# Patient Record
Sex: FEMALE | Race: WHITE | NOT HISPANIC OR LATINO | Employment: FULL TIME | URBAN - METROPOLITAN AREA
[De-identification: names, ages, dates, MRNs, and addresses within clinical notes are randomized per-mention and may not be internally consistent; named-entity substitution may affect disease eponyms.]

---
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
## 2018-09-25 ENCOUNTER — OFFICE VISIT - HEALTHEAST (OUTPATIENT)
Dept: PEDIATRICS | Facility: CLINIC | Age: 18
End: 2018-09-25

## 2018-09-25 DIAGNOSIS — I35.0 AORTIC STENOSIS: ICD-10-CM

## 2018-09-25 DIAGNOSIS — F32.5 MAJOR DEPRESSION IN COMPLETE REMISSION (H): ICD-10-CM

## 2018-09-25 DIAGNOSIS — N94.6 DYSMENORRHEA: ICD-10-CM

## 2018-09-25 DIAGNOSIS — Q96.9 TURNER'S SYNDROME: ICD-10-CM

## 2018-09-25 ASSESSMENT — MIFFLIN-ST. JEOR: SCORE: 1462.59

## 2018-10-05 ENCOUNTER — COMMUNICATION - HEALTHEAST (OUTPATIENT)
Dept: INTERNAL MEDICINE | Facility: CLINIC | Age: 18
End: 2018-10-05

## 2018-11-13 ENCOUNTER — OFFICE VISIT - HEALTHEAST (OUTPATIENT)
Dept: CARDIOLOGY | Facility: CLINIC | Age: 18
End: 2018-11-13

## 2018-11-13 DIAGNOSIS — I34.1 MITRAL REGURGITATION DUE TO CUSP PROLAPSE: ICD-10-CM

## 2018-11-13 DIAGNOSIS — Q96.9 GONADAL DYSGENESIS: ICD-10-CM

## 2018-11-13 DIAGNOSIS — I34.0 MITRAL REGURGITATION DUE TO CUSP PROLAPSE: ICD-10-CM

## 2018-11-13 ASSESSMENT — MIFFLIN-ST. JEOR: SCORE: 1458.96

## 2018-11-14 ENCOUNTER — OFFICE VISIT - HEALTHEAST (OUTPATIENT)
Dept: MIDWIFE SERVICES | Facility: CLINIC | Age: 18
End: 2018-11-14

## 2018-11-14 DIAGNOSIS — F41.9 ANXIETY: ICD-10-CM

## 2018-11-14 DIAGNOSIS — Q96.3 TURNER SYNDROME WITH MOSAICISM: ICD-10-CM

## 2018-11-14 DIAGNOSIS — Z00.00 ENCOUNTER FOR MEDICAL EXAMINATION TO ESTABLISH CARE: ICD-10-CM

## 2018-11-14 DIAGNOSIS — E03.9 ACQUIRED HYPOTHYROIDISM: ICD-10-CM

## 2018-11-14 DIAGNOSIS — I08.0 MITRAL REGURGITATION AND AORTIC STENOSIS: ICD-10-CM

## 2018-11-14 DIAGNOSIS — L98.9 SKIN LESION OF RIGHT ARM: ICD-10-CM

## 2018-11-14 DIAGNOSIS — E66.811 CLASS 1 OBESITY WITH SERIOUS COMORBIDITY AND BODY MASS INDEX (BMI) OF 31.0 TO 31.9 IN ADULT, UNSPECIFIED OBESITY TYPE: ICD-10-CM

## 2018-11-14 LAB
ALT SERPL W P-5'-P-CCNC: 21 U/L (ref 0–45)
AST SERPL W P-5'-P-CCNC: 18 U/L (ref 0–40)
CHOLEST SERPL-MCNC: 188 MG/DL
FASTING STATUS PATIENT QL REPORTED: YES
HDLC SERPL-MCNC: 62 MG/DL
IGA SERPL-MCNC: 116 MG/DL (ref 65–400)
LDLC SERPL CALC-MCNC: 111 MG/DL
T4 FREE SERPL-MCNC: 1 NG/DL (ref 0.7–1.8)
TRIGL SERPL-MCNC: 76 MG/DL
TSH SERPL DL<=0.005 MIU/L-ACNC: 4.51 UIU/ML (ref 0.3–5)

## 2018-11-14 ASSESSMENT — MIFFLIN-ST. JEOR: SCORE: 1469.27

## 2018-11-15 LAB
25(OH)D3 SERPL-MCNC: 24 NG/ML (ref 30–80)
HBA1C MFR BLD: 5.1 % (ref 4.2–6.1)
TTG IGA SER-ACNC: 0.2 U/ML
TTG IGG SER-ACNC: <0.6 U/ML

## 2018-11-17 ENCOUNTER — COMMUNICATION - HEALTHEAST (OUTPATIENT)
Dept: OBGYN | Facility: CLINIC | Age: 18
End: 2018-11-17

## 2020-01-13 ENCOUNTER — COMMUNICATION - HEALTHEAST (OUTPATIENT)
Dept: PEDIATRICS | Facility: CLINIC | Age: 20
End: 2020-01-13

## 2020-02-04 ENCOUNTER — COMMUNICATION - HEALTHEAST (OUTPATIENT)
Dept: ADMINISTRATIVE | Facility: CLINIC | Age: 20
End: 2020-02-04

## 2020-02-17 ENCOUNTER — OFFICE VISIT - HEALTHEAST (OUTPATIENT)
Dept: FAMILY MEDICINE | Facility: CLINIC | Age: 20
End: 2020-02-17

## 2020-02-17 DIAGNOSIS — Q96.9 GONADAL DYSGENESIS: ICD-10-CM

## 2020-02-17 DIAGNOSIS — N91.3 PRIMARY OLIGOMENORRHEA: ICD-10-CM

## 2020-02-17 DIAGNOSIS — I08.0 MITRAL REGURGITATION AND AORTIC STENOSIS: ICD-10-CM

## 2020-02-17 DIAGNOSIS — F32.A MILD DEPRESSION: ICD-10-CM

## 2020-02-17 DIAGNOSIS — Z12.4 SCREENING FOR CERVICAL CANCER: ICD-10-CM

## 2020-02-17 DIAGNOSIS — F41.1 GAD (GENERALIZED ANXIETY DISORDER): ICD-10-CM

## 2020-02-17 DIAGNOSIS — E55.9 VITAMIN D DEFICIENCY: ICD-10-CM

## 2020-02-17 DIAGNOSIS — Z13.228 SCREENING FOR METABOLIC DISORDER: ICD-10-CM

## 2020-02-17 DIAGNOSIS — Z11.3 SCREEN FOR STD (SEXUALLY TRANSMITTED DISEASE): ICD-10-CM

## 2020-02-17 DIAGNOSIS — I35.0 NONRHEUMATIC AORTIC VALVE STENOSIS: ICD-10-CM

## 2020-02-17 DIAGNOSIS — E03.9 ACQUIRED HYPOTHYROIDISM: ICD-10-CM

## 2020-02-17 DIAGNOSIS — Z00.00 ROUTINE GENERAL MEDICAL EXAMINATION AT A HEALTH CARE FACILITY: ICD-10-CM

## 2020-02-17 LAB
ALBUMIN SERPL-MCNC: 4.5 G/DL (ref 3.5–5)
ALP SERPL-CCNC: 63 U/L (ref 45–120)
ALT SERPL W P-5'-P-CCNC: 31 U/L (ref 0–45)
AST SERPL W P-5'-P-CCNC: 24 U/L (ref 0–40)
BILIRUB DIRECT SERPL-MCNC: 0.2 MG/DL
BILIRUB SERPL-MCNC: 0.7 MG/DL (ref 0–1)
CHOLEST SERPL-MCNC: 208 MG/DL
FASTING STATUS PATIENT QL REPORTED: YES
HBA1C MFR BLD: 5 % (ref 3.5–6)
HDLC SERPL-MCNC: 70 MG/DL
LDLC SERPL CALC-MCNC: 124 MG/DL
PROT SERPL-MCNC: 7.2 G/DL (ref 6–8)
TRIGL SERPL-MCNC: 71 MG/DL
TSH SERPL DL<=0.005 MIU/L-ACNC: 3.4 UIU/ML (ref 0.3–5)

## 2020-02-17 RX ORDER — NORGESTIMATE AND ETHINYL ESTRADIOL 7DAYSX3 28
1 KIT ORAL DAILY
Qty: 84 TABLET | Refills: 3 | Status: SHIPPED | OUTPATIENT
Start: 2020-02-17 | End: 2021-07-29

## 2020-02-17 ASSESSMENT — MIFFLIN-ST. JEOR: SCORE: 1494.54

## 2020-02-17 ASSESSMENT — ANXIETY QUESTIONNAIRES
5. BEING SO RESTLESS THAT IT IS HARD TO SIT STILL: NEARLY EVERY DAY
3. WORRYING TOO MUCH ABOUT DIFFERENT THINGS: MORE THAN HALF THE DAYS
6. BECOMING EASILY ANNOYED OR IRRITABLE: MORE THAN HALF THE DAYS
2. NOT BEING ABLE TO STOP OR CONTROL WORRYING: NEARLY EVERY DAY
IF YOU CHECKED OFF ANY PROBLEMS ON THIS QUESTIONNAIRE, HOW DIFFICULT HAVE THESE PROBLEMS MADE IT FOR YOU TO DO YOUR WORK, TAKE CARE OF THINGS AT HOME, OR GET ALONG WITH OTHER PEOPLE: VERY DIFFICULT
7. FEELING AFRAID AS IF SOMETHING AWFUL MIGHT HAPPEN: MORE THAN HALF THE DAYS
4. TROUBLE RELAXING: SEVERAL DAYS
GAD7 TOTAL SCORE: 16
1. FEELING NERVOUS, ANXIOUS, OR ON EDGE: NEARLY EVERY DAY

## 2020-02-17 ASSESSMENT — PATIENT HEALTH QUESTIONNAIRE - PHQ9: SUM OF ALL RESPONSES TO PHQ QUESTIONS 1-9: 11

## 2020-02-18 LAB
25(OH)D3 SERPL-MCNC: 15.6 NG/ML (ref 30–80)
C TRACH DNA SPEC QL PROBE+SIG AMP: NEGATIVE
N GONORRHOEA DNA SPEC QL NAA+PROBE: NEGATIVE

## 2020-02-19 ENCOUNTER — COMMUNICATION - HEALTHEAST (OUTPATIENT)
Dept: FAMILY MEDICINE | Facility: CLINIC | Age: 20
End: 2020-02-19

## 2020-02-19 LAB
BKR LAB AP ABNORMAL BLEEDING: YES
BKR LAB AP BIRTH CONTROL/HORMONES: NORMAL
BKR LAB AP CERVICAL APPEARANCE: NORMAL
BKR LAB AP GYN ADEQUACY: NORMAL
BKR LAB AP GYN INTERPRETATION: NORMAL
BKR LAB AP HPV REFLEX: NORMAL
BKR LAB AP LMP: NORMAL
BKR LAB AP PATIENT STATUS: NO
BKR LAB AP PREVIOUS ABNORMAL: NO
BKR LAB AP PREVIOUS NORMAL: NO
HIGH RISK?: NO
PATH REPORT.COMMENTS IMP SPEC: NORMAL
RESULT FLAG (HE HISTORICAL CONVERSION): NORMAL

## 2020-02-19 RX ORDER — ERGOCALCIFEROL 1.25 MG/1
CAPSULE ORAL
Qty: 24 CAPSULE | Refills: 0 | Status: SHIPPED | OUTPATIENT
Start: 2020-02-19 | End: 2021-08-20

## 2020-02-20 ENCOUNTER — COMMUNICATION - HEALTHEAST (OUTPATIENT)
Dept: FAMILY MEDICINE | Facility: CLINIC | Age: 20
End: 2020-02-20

## 2021-04-13 ENCOUNTER — COMMUNICATION - HEALTHEAST (OUTPATIENT)
Dept: TELEHEALTH | Facility: CLINIC | Age: 21
End: 2021-04-13

## 2021-04-13 ENCOUNTER — OFFICE VISIT - HEALTHEAST (OUTPATIENT)
Dept: FAMILY MEDICINE | Facility: CLINIC | Age: 21
End: 2021-04-13

## 2021-04-13 DIAGNOSIS — E03.9 ACQUIRED HYPOTHYROIDISM: ICD-10-CM

## 2021-04-13 DIAGNOSIS — E55.9 VITAMIN D DEFICIENCY: ICD-10-CM

## 2021-04-13 DIAGNOSIS — Q96.9 GONADAL DYSGENESIS: ICD-10-CM

## 2021-04-13 DIAGNOSIS — I35.0 NONRHEUMATIC AORTIC VALVE STENOSIS: ICD-10-CM

## 2021-04-13 DIAGNOSIS — Z13.220 LIPID SCREENING: ICD-10-CM

## 2021-04-13 DIAGNOSIS — Z13.1 SCREENING FOR DIABETES MELLITUS: ICD-10-CM

## 2021-04-13 DIAGNOSIS — E66.09 CLASS 1 OBESITY DUE TO EXCESS CALORIES WITH SERIOUS COMORBIDITY AND BODY MASS INDEX (BMI) OF 31.0 TO 31.9 IN ADULT: ICD-10-CM

## 2021-04-13 DIAGNOSIS — F32.5 MAJOR DEPRESSION IN COMPLETE REMISSION (H): ICD-10-CM

## 2021-04-13 DIAGNOSIS — Z00.00 ROUTINE GENERAL MEDICAL EXAMINATION AT A HEALTH CARE FACILITY: ICD-10-CM

## 2021-04-13 DIAGNOSIS — Z30.09 COUNSELING FOR BIRTH CONTROL, ORAL CONTRACEPTIVES: ICD-10-CM

## 2021-04-13 DIAGNOSIS — F41.9 ANXIETY: ICD-10-CM

## 2021-04-13 DIAGNOSIS — Z11.3 SCREEN FOR STD (SEXUALLY TRANSMITTED DISEASE): ICD-10-CM

## 2021-04-13 DIAGNOSIS — I08.0 MITRAL REGURGITATION AND AORTIC STENOSIS: ICD-10-CM

## 2021-04-13 DIAGNOSIS — E66.811 CLASS 1 OBESITY DUE TO EXCESS CALORIES WITH SERIOUS COMORBIDITY AND BODY MASS INDEX (BMI) OF 31.0 TO 31.9 IN ADULT: ICD-10-CM

## 2021-04-13 LAB
CHOLEST SERPL-MCNC: 185 MG/DL
FASTING STATUS PATIENT QL REPORTED: YES
FASTING STATUS PATIENT QL REPORTED: YES
GLUCOSE BLD-MCNC: 98 MG/DL (ref 70–125)
HDLC SERPL-MCNC: 59 MG/DL
LDLC SERPL CALC-MCNC: 115 MG/DL
TRIGL SERPL-MCNC: 57 MG/DL
TSH SERPL DL<=0.005 MIU/L-ACNC: 5.85 UIU/ML (ref 0.3–5)

## 2021-04-13 RX ORDER — BUPROPION HYDROCHLORIDE 150 MG/1
150 TABLET ORAL EVERY MORNING
Qty: 30 TABLET | Refills: 2 | Status: SHIPPED | OUTPATIENT
Start: 2021-04-13 | End: 2021-08-20

## 2021-04-13 ASSESSMENT — MIFFLIN-ST. JEOR: SCORE: 1461.9

## 2021-04-14 LAB — 25(OH)D3 SERPL-MCNC: 18.7 NG/ML (ref 30–80)

## 2021-04-15 LAB
C TRACH DNA SPEC QL PROBE+SIG AMP: NEGATIVE
N GONORRHOEA DNA SPEC QL NAA+PROBE: NEGATIVE

## 2021-04-15 RX ORDER — LEVOTHYROXINE SODIUM 25 UG/1
25 TABLET ORAL DAILY
Qty: 90 TABLET | Refills: 0 | Status: SHIPPED | OUTPATIENT
Start: 2021-04-15 | End: 2021-08-20

## 2021-04-27 ENCOUNTER — TELEPHONE (OUTPATIENT)
Dept: ALLERGY | Facility: CLINIC | Age: 21
End: 2021-04-27

## 2021-04-27 NOTE — TELEPHONE ENCOUNTER
This patient has not been seen by provider.  Left secure voicemail for pharmacy to send request to pt's PCP.    Rosana Al RN

## 2021-04-27 NOTE — TELEPHONE ENCOUNTER
Prescription refill request.    Drug: Tri-Sprintec tablets 28's    Qty: 84    Last fill: 12/10/2020

## 2021-05-27 ASSESSMENT — PATIENT HEALTH QUESTIONNAIRE - PHQ9: SUM OF ALL RESPONSES TO PHQ QUESTIONS 1-9: 11

## 2021-05-28 ASSESSMENT — ANXIETY QUESTIONNAIRES: GAD7 TOTAL SCORE: 16

## 2021-05-31 VITALS — HEIGHT: 61 IN | WEIGHT: 179.6 LBS | BODY MASS INDEX: 33.66 KG/M2 | BODY MASS INDEX: 33.56 KG/M2

## 2021-05-31 VITALS — BODY MASS INDEX: 34.15 KG/M2 | HEIGHT: 61 IN | WEIGHT: 180.9 LBS

## 2021-05-31 VITALS — HEIGHT: 61 IN | WEIGHT: 179.1 LBS | BODY MASS INDEX: 33.81 KG/M2

## 2021-06-01 ENCOUNTER — RECORDS - HEALTHEAST (OUTPATIENT)
Dept: ADMINISTRATIVE | Facility: CLINIC | Age: 21
End: 2021-06-01

## 2021-06-01 VITALS — BODY MASS INDEX: 32.06 KG/M2 | HEIGHT: 61 IN | WEIGHT: 169.8 LBS

## 2021-06-02 ENCOUNTER — RECORDS - HEALTHEAST (OUTPATIENT)
Dept: ADMINISTRATIVE | Facility: CLINIC | Age: 21
End: 2021-06-02

## 2021-06-02 VITALS — BODY MASS INDEX: 31.91 KG/M2 | WEIGHT: 169 LBS | HEIGHT: 61 IN

## 2021-06-02 VITALS — WEIGHT: 170.4 LBS | BODY MASS INDEX: 32.17 KG/M2 | HEIGHT: 61 IN

## 2021-06-03 ENCOUNTER — RECORDS - HEALTHEAST (OUTPATIENT)
Dept: ADMINISTRATIVE | Facility: CLINIC | Age: 21
End: 2021-06-03

## 2021-06-04 VITALS
DIASTOLIC BLOOD PRESSURE: 60 MMHG | WEIGHT: 175.8 LBS | SYSTOLIC BLOOD PRESSURE: 134 MMHG | BODY MASS INDEX: 33.19 KG/M2 | HEART RATE: 92 BPM | HEIGHT: 61 IN

## 2021-06-05 VITALS
SYSTOLIC BLOOD PRESSURE: 126 MMHG | WEIGHT: 167.5 LBS | HEIGHT: 62 IN | DIASTOLIC BLOOD PRESSURE: 64 MMHG | HEART RATE: 96 BPM | BODY MASS INDEX: 30.82 KG/M2

## 2021-06-05 NOTE — TELEPHONE ENCOUNTER
Who is calling:  Patient   Reason for Call:  Patient called questioning why this medication was denied. Patient was informed of the message below. Patient stated it's only a birth control medication why is it so difficult to get it filled?! Patient was upset to hear the message below and declined to schedule appointment. Patient ended the call.  Date of last appointment with primary care: 11/14/2018  Okay to leave a detailed message: Patient ended the call before writer was able to ask for a call back number.

## 2021-06-05 NOTE — TELEPHONE ENCOUNTER
RN cannot approve Refill Request    RN can NOT refill this medication Protocol failed and NO refill given.    Kiersten Estrella, [facility] Triage/Med Refill 1/15/2020    Requested Prescriptions   Pending Prescriptions Disp Refills     TRI-SPRINTEC, 28, 0.18/0.215/0.25 mg-35 mcg (28) tablet [Pharmacy Med Name: TRI-SPRINTEC TABLETS 28] 84 tablet 3     Sig: TAKE 1 TABLET BY MOUTH DAILY       Oral Contraceptives Protocol Failed - 1/13/2020  5:48 PM        Failed - Visit with PCP or prescribing provider visit in last 12 months      Last office visit with prescriber/PCP: 9/25/2018 Alayna Montelongo MD OR same dept: Visit date not found OR same specialty: 9/25/2018 Alayna Montelongo MD  Last physical: 11/3/2017 Last MTM visit: Visit date not found   Next visit within 3 mo: Visit date not found  Next physical within 3 mo: Visit date not found  Prescriber OR PCP: Alayna Montelongo MD  Last diagnosis associated with med order: There are no diagnoses linked to this encounter.  If protocol passes may refill for 12 months if within 3 months of last provider visit (or a total of 15 months).

## 2021-06-05 NOTE — TELEPHONE ENCOUNTER
Only phone number listed is not in service and has not been seen since 11/2018 Pharmacy notified will need visit before refills.

## 2021-06-06 NOTE — PROGRESS NOTES
Dear Charlotte,    Your recent Pap smear result came back within normal limits i we will plan to repeat the pap smear in next 3 yr  Please feel free to call if you have any concerns or questions..    Sherron Lutz MD 2/19/2020 4:44 PM

## 2021-06-06 NOTE — TELEPHONE ENCOUNTER
Reached out to patient and relayed the below message. No additional questions at this time. Allison Castañeda        Notes recorded by Sherron Lutz MD on 2/19/2020 at 1:24 PM CST  Dear Charlotte,    It was a pleasure to see you in the office the other day.    I reviewed Your  recent  lab results All  Of them  back with in normal limit no concern for diabetes or thyroid hormone levels cholesterol level is slightly elevated which can easily be improved by diet and exercise,    Your vitamin D level came back quite low , we recommend treatment with prescription strength supplement. I will send prescription for 50,000iu Vitamin D to be taken ONLY  for next 3 month to the pharmacy, and regular strength prescription for Vit D [id number] can be picked Over the counter. Will recheck the level in next -1yr.      Feel free to call for any questions or concern.       level please feel free to call back for any concerns or questions.    Thank you again for allowing me to be a part of your care!

## 2021-06-06 NOTE — TELEPHONE ENCOUNTER
Reached out to patient and relayed the below message. No additional questions at this time. Allison Castañeda      Notes recorded by Sherron Lutz MD on 2/19/2020 at 4:44 PM CST  Dear Charlotte,    Your recent Pap smear result came back within normal limits i we will plan to repeat the pap smear in next 3 yr  Please feel free to call if you have any concerns or questions..    Sherron Lutz MD 2/19/2020 4:44 PM

## 2021-06-06 NOTE — TELEPHONE ENCOUNTER
Dear Charlotte,    It was a pleasure to see you in the office the other day.    I reviewed Your  recent  lab results All  Of them  back with in normal limit no concern for diabetes or thyroid hormone levels cholesterol level is slightly elevated which can easily be improved by diet and exercise,    Your vitamin D level came back quite low , we recommend treatment with prescription strength supplement. I will send prescription for 50,000iu Vitamin D to be taken ONLY  for next 3 month to the pharmacy, and regular strength prescription for Vit D [id number] can be picked Over the counter. Will recheck the level in next -1yr.

## 2021-06-13 NOTE — PROGRESS NOTES
[facility] [place] Child Check    ASSESSMENT & PLAN  Charlotte Blair is a 17 y.o. who has normal growth and normal development.    Diagnoses and all orders for this visit:    Encounter for annual physical exam  -     Lipid Profile  -     Chlamydia trachomatis & Neisseria gonorrhoeae, Amplified Detection    Horn Syndrome  -     Thyroid Stimulating Hormone (TSH)  -     T4, Free  -     Vitamin D, Total (25-Hydroxy)  -     Ambulatory referral to Endocrinology  -     Ambulatory referral to Cardiology    Mitral regurgitation and aortic stenosis  -     Ambulatory referral to Cardiology    Anxiety    Other orders  -     norgestimate-ethinyl estradiol (ORTHO TRI-CYCLEN, 28,) 0.18/0.215/0.25 mg-35 mcg (28) Tab tablet; Take 1 tablet by mouth daily.  Dispense: 84 tablet; Refill: 3  -     sertraline (ZOLOFT) 50 MG tablet; TAke 1/2 tablet once daily for 4 days, then increase to 1 tablet daily  Dispense: 30 tablet; Refill: 0  -     Influenza, Seasonal,Quad Inj, 36+ MOS    Discussed need for persistent follow up with specialty services- she is in need of follow up at Endocrine and Cardiology.  Charlotte would like to follow up at [facility] as that has been where her previous specialty providers have been at- at age 17 with her diagnoses I believe that they will continue to see her through late teens/ early 20s.  Referral written/ phone numbers given to Charlotte to make her appt.  Continue with OCP- she does not always take her pills and has difficulty with mid cycle spotting due to that.  Suggestion to put it next to tooth brush or set reminder on phone daily to take medications  She needs follow up with endocrine.  Per previous notes- she has longstanding history of suboptimal compliance with her thyroid medication as well  I have recommended becoming a part of our healthcare home.  As a young adult with multiple medical conditions, extra support to take control of medical appts/ follow ups is important for her overall health and  wellness into the years ahead.    Lastly, we discussed mental health concerns of anxiety.  She was previously on escitalopram for 1-2 months a year ago, but never returned for follow up appt.  Her GREGG 7 and PHQ9 are elevated.  Intiation of sertraline 25 mg and then increase to 50 mg reviewed.  Follow up in clinic in 4 weeks for refill and medication management.      Return to clinic in 1 year for a Well Child Check or sooner as needed    IMMUNIZATIONS/LABS  Immunizations were reviewed and orders were placed as appropriate.    REFERRALS  Dental:  Recommend routine dental care as appropriate., The patient has already established care with a dentist.  Other:  Referrals were made for endocrinology and cardiology.    ANTICIPATORY GUIDANCE  Social:  Employment and Extracurricular Activities  Parenting:  Support and Chores  Nutrition:  Dieting and Body Image  Play and Communication:  Hobbies  Health:  Activity (>45 min/day), Sleep and Dental Care  Safety:  Seat Belts  Sexuality:  Safe Sex and STD's    HEALTH HISTORY  Do you have any concerns that you'd like to discuss today?: Birth Control questions reviewed below.     Mental Health: She took 10 mg Lexapro daily for one month in December 2016 and stopped because she did not have a refill and she did not feel that it was helping. She describes fixating on things and worrying about them. She is not sure if this is symptomatic of depression or anxiety. She states that she ends up being flaky because she starts to get anxious. She has always had a problem with sleep, both with falling asleep and getting too much sleep. Of note, her dad passed away in May 2016 and she feels that she is doing well with handling this without therapy. Her GREGG-7 score is 13 and PHQ-9 score is 15 today.     No question data found.    Do you have any significant health concerns in your family history?: No  No family history on file.  Since your last visit, have there been any major changes in your  family, such as a move, job change, separation, divorce, or death in the family?: No    Home  Who lives in your home?:    Social History     Social History Narrative    Lives in apartment with roommate.      Do you have any trouble with sleep?:  No. She goes to bed between 1-3 AM and wakes up between 11 AM and noon. She works from 3-10 PM each day.     Education  What school does your child attend?:  She is taking a break but plans to go for massage therapy.   What grade is your child in?:  [employer] soon  How does the patient perform in school (grades, behavior, attention, homework?: She is currently working as a  at [employer]. She lives in an apartment with a roommate.      Eating  Does patient eat regular meals including fruits and vegetables?:  Yes  What is the patient drinking (cow's milk, water, soda, juice, sports drinks, energy drinks, etc)?: cow's milk- 1%, water and energy drinks  Does patient have concerns about body or appearance?:  No. She is living on her own and is working to cook more healthy meals. She is working to eat vegetables a couple times per week. She describes feeling a little flicker in her heart when she drinks the Energy Drinks.     Activities  Does the patient have friends?:  Yes  Does the patient get at least one hour of physical activity per day?:  Yes  Does the patient have less than 2 hours of screen time per day (aside from homework)?:  No  What does your child do for exercise?:  Go to the gym.   Does the patient have interest/participate in community activities/volunteers/school sports?: No    MENTAL HEALTH SCREENING  PHQ-2 Total Score: 2 (11/3/2017  8:00 AM)  Depression Follow-up Plan: coping support assessment (11/3/2017  8:00 AM)  PHQ-9 Total Score: 15 (11/3/2017  8:00 AM)    TB Risk Assessment:  The patient and/or parent/guardian answer positive to:  patient and/or parent/guardian answer 'no' to all screening TB questions    Dental  Is your child being seen by  "a dentist?  Yes  Flouride Varnish Application Screening  Is child seen by dentist?     Yes    Patient Active Problem List   Diagnosis     Horn Syndrome     Pain During Urination (Dysuria)     Aortic stenosis     Mitral regurgitation and aortic stenosis       Drugs  Does the patient use tobacco/alcohol/drugs?:  Denies    Safety  Does the patient have any safety concerns (peer or home)?:  no  Does the patient use safety belts, helmets and other safety equipment?:  yes    Sex  Is the patient sexually active?:  No, has been in the past. Denies SGA. Denies concern for STDs.     REVIEW OF SYSTEMS  She is taking birth control pills but is not taking these consistently, she is usually missing 1-2 pills per week. She describes her cramps as still being quite painful. Her last period was around 10/15/14507. She wears glasses.     MEASUREMENTS  Height:  5' 1.25\" (1.556 m)  Weight: 179 lb 1.6 oz (81.2 kg)  BMI: Body mass index is 33.56 kg/(m^2).  Blood Pressure: 128/68  Blood pressure percentiles are 98 % systolic and 70 % diastolic based on NHBPEP's 4th Report. Blood pressure percentile targets: 90: 120/76, 95: 124/80, 99 + 5 mmH/93.    PHYSICAL EXAM  Constitutional: She appears well-developed and well-nourished. She is awake, alert, and cooperative.  HEENT: Head: Normocephalic. Atraumatic.   Right Ear: Normal, pearly tympanic membrane; external ear and canal normal.    Left Ear: Normal, pearly tympanic membrane; external ear and canal normal.    Nose: Nose normal.    Mouth/Throat: Mucous membranes are moist. Oropharynx is clear. Tonsils +1 bilaterally. Normal dentition.   Eyes: Conjunctivae and lids are normal. PERRL, EOMI.   Neck: Supple without lymphadenopathy or tenderness. No thyromegaly or nodules.  Cardiovascular: Normal rate and regular rhythm. Femoral pulses 2+ bilaterally. S1 and S2 with 2/6 systolic ejection murmur with mid-systolic click.  Pulmonary: Clear to auscultation bilaterally. Effort and breath " sounds normal. There is normal air entry.   Chest: Normal chest wall. Breast development is normal. SMR 4.   Abdominal: Soft, nontender, nondistended. Bowel sounds are normal. No hepatosplenomegaly.  Musculoskeletal: Moving all extremities with normal range of motion. Normal strength and tone. No abnormalities. No pain in the extremities.  Spine: Spine straight without curvature noted  Genitourinary: Normal external female genitalia. SMR 4.   Neurological: She is alert and oriented x3. She has normal reflexes. Normal tone and DTRs +2 bilaterally.  Psychiatric: She has a normal mood and affect. Her speech and behavior are normal.  Skin: Clear. No rashes or lesions noted.    ADDITIONAL HISTORY SUMMARIZED (2): Reviewed Cardiology Note from 10/20/2014 regarding Horn Syndrome and mitral valve prolapse. Reviewed Endocrinology Note from 9/10/2014 regarding Horn Syndrome.  DECISION TO OBTAIN EXTRA INFORMATION (1): None.   RADIOLOGY TESTS (1): None.  LABS (1): None.  MEDICINE TESTS (1): None.  INDEPENDENT REVIEW (2 each): None.     The visit lasted a total of 34 minutes face to face with the patient. Over 50% of the time was spent counseling and educating the patient about annual wellness.    I, Mojgan Mckeon, am scribing for and in the presence of, Dr. Montelongo.    IAlayna MD, personally performed the services described in this documentation, as scribed by Mojgan Mckeon in my presence, and it is both accurate and complete.    Total Data Points: 2

## 2021-06-14 NOTE — PROGRESS NOTES
ASSESSMENT:  1. Anxiety    2. Horn Syndrome    3. Mitral regurgitation and aortic stenosis    PLAN:    Charlotte is doing well with her sertraline dose.  Her GREGG 7 is improved from 13 to 8. She has taken initiative to make Endocrinology visit and cardiology visit.  She will have follow up with cardiology in 4 months.  She is considering becoming part of health care home. I do feel her responsibility for follow up in the past 1 month has improved from years previous, even from last year.  REfills on OCP given for 1 year as well as 6 months for sertraline.  Should be seen for medication check in 6 months time.    Patient Instructions   Continue taking the sertraline daily as prescribed for the next 6 months. Follow-up in clinic in 6 months for a medication check.        No orders of the defined types were placed in this encounter.    Medications Discontinued During This Encounter   Medication Reason     sertraline (ZOLOFT) 50 MG tablet Reorder     norgestimate-ethinyl estradiol (ORTHO TRI-CYCLEN, 28,) 0.18/0.215/0.25 mg-35 mcg (28) Tab tablet Reorder       Return in about 6 months (around 6/5/2018), or for medication check, for Recheck.    CHIEF COMPLAINT:  Chief Complaint   Patient presents with     Follow-up     medication       HISTORY OF PRESENT ILLNESS:  Charlotte is a 17 y.o. female presenting to the clinic today for an anxiety follow-up.    She is currently taking sertraline 50 mg. She states it has been helping her. She feels less anxious on a consistent basis. She is still tired often but she feels more interested in daily activities. She continues to work at her job. Her sleep schedule is variable but she gets sufficient sleep. She generally sleeps from 2 a.m. To 10 a.m.  She continues to have occasional difficulty falling asleep. She denies feeling too calm or suppressed while on the sertraline. She reports being able to handle her anxiety most of the time now. She has not had any recent panic attacks. She  "would like to continue on her current regimen. She plans to enroll in post-secondary education next spring.    PHQ-9 Total Score: 9 (12/5/2017 10:00 AM)  GREGG-7 Total: 8 (12/5/2017 10:00 AM)    REVIEW OF SYSTEMS:   [facility]. She had labs drawn yesterday for thyroid hormone levels and celiac testing. Her echocardiogram was stable. She was recommended to follow-up in a year for cardiology and in 4 months for endocrinology. She takes her birth control pills as prescribed daily. She has regular monthly menstrual periods. All other systems are negative. She did not have as much break through bleeding with taking her OCP on a regular basis.    PFSH:  She met with a care guide yesterday to enroll in a program to manage her care. She has daily interaction with mother, but lives on her own in an apartment.     Past Medical History:   Diagnosis Date     Horn Syndrome     Created by Conversion      Family History   Problem Relation Age of Onset     Heart attack Father        History reviewed. No pertinent surgical history.    TOBACCO USE:  History   Smoking Status     Never Smoker   Smokeless Tobacco     Never Used     VITALS:  Vitals:    12/05/17 1016   BP: 100/60   Patient Site: Left Arm   Patient Position: Sitting   Cuff Size: Adult Large   Pulse: 88   Weight: 180 lb 14.4 oz (82.1 kg)   Height: 5' 1.25\" (1.556 m)     Wt Readings from Last 3 Encounters:   12/05/17 180 lb 14.4 oz (82.1 kg) (95 %, Z= 1.61)*   12/04/17 179 lb 9.6 oz (81.5 kg) (94 %, Z= 1.59)*   11/03/17 179 lb 1.6 oz (81.2 kg) (94 %, Z= 1.58)*     * Growth percentiles are based on CDC 2-20 Years data.     Body mass index is 33.9 kg/(m^2).    PHYSICAL EXAM:  General: Alert, no acute distress.  Ears: TMs are without erythema, pus or fluid. Position and landmarks are normal.    Nose: Clear.    Throat: Oropharynx is moist and clear, without tonsillar hypertrophy, asymmetry, exudate or " lesions.  Neck: Supple without lymphadenopathy or tenderness. No thyromegaly or nodules.  Lungs: Clear to auscultation bilaterally. No wheezes, rhonchi, or rales. No prolongation of expiratory phase. No tachypnea, retractions, or increased work of breathing.  Cardiac: Regular rate and rhythm, Grade 2/6 systolic ejection murmur with mid-systolic click audible over the left sternal border.    ADDITIONAL HISTORY SUMMARIZED (2): None.  DECISION TO OBTAIN EXTRA INFORMATION (1): None.   RADIOLOGY TESTS (1): None.  LABS (1): None.  MEDICINE TESTS (1): None.  INDEPENDENT REVIEW (2 each): None.    The visit lasted a total of 15 minutes face to face with the patient. Over 50% of the time was spent counseling and educating the patient about continued medication management of her anxiety.    IFrankie, am scribing for and in the presence of, Dr. Montelongo.    Alayna VACA MD, personally performed the services described in this documentation, as scribed by Frankie Moss in my presence, and it is both accurate and complete.    MEDICATIONS:  Current Outpatient Prescriptions   Medication Sig Dispense Refill     norgestimate-ethinyl estradiol (ORTHO TRI-CYCLEN, 28,) 0.18/0.215/0.25 mg-35 mcg (28) Tab tablet Take 1 tablet by mouth daily. 84 tablet 3     sertraline (ZOLOFT) 50 MG tablet Take 1 tablet (50 mg total) by mouth daily. TAke 1/2 tablet once daily for 4 days, then increase to 1 tablet daily 90 tablet 1     No current facility-administered medications for this visit.        Total data points: 0

## 2021-06-14 NOTE — PROGRESS NOTES
"On 11/8/17 at 9:19 AM, Dr. Montelongo sent the following message:  \"I would like Charlotte to be part of health care home.  She is with Horn syndrome and aortic stenosis, has history of not following through with follow up care with her specialists.  I saw her last week and mentioned our health care home program as support for her.  She is interested.  She had to set up her voice mail, but I told her that we would be reaching out to her.\"    Met with the patient today and discussed Clinic Care Coordination.  The patient would like to think about enrollment and will call me with a yes or no  I have provided the patient a brochure explaining Clinic Care Coordination as well as my business card  I will call the patient in 1 week if I haven't heard from her sooner    Patient was 18 min late for her appointment today  Informed the patient, per Dr. Montelongo, she can either wait (about 1 hr) or reschedule  Dr. Montelongo gave permission to use a same day/reserved appointment for this week  Patient chose to reschedule due to having other appointments to go to  Assisted in rescheduling for tomorrow, 12/5/17, at 10:00      "

## 2021-06-14 NOTE — PROGRESS NOTES
[id number]    Charlotte Lopez    YES- discussed on 11/9/17    Horn syndrome, aortic stenosis, mitral regurgitation, needs help coordinating care in adulthood    Jessica scheduled for meet and greet Decmber 4 during PCP visit    Action  Due Date   [facility] when scheduled    Action  Due Date   NA    Action  Due Date   Jessica scheduled for meet and greet Decmber 4 during PCP visit

## 2021-06-14 NOTE — PROGRESS NOTES
Discussed patient's case with Dr. Montelongo during Care Conferences today, 11/9/17  Dr. Montelongo has referred patient to Clinic Care Coordination  Patient has a pending appointment with Dr. Montelongo on 12/4/17 at 9:20  Care Guide has placed the patient on my schedule as well in order to meet with the patient and discuss enrollment with [facility]    Patient has Horn Syndrome, Aortic Stenosis, and Mitro Regurgitation  Patient needs assistance coordinating care with specialists in adulthood  Patient can continue to see Dr. Montelongo, Pediatrician, until age 19    Pending Appointments:  12/4/17 at 9:20 with Dr. Montelongo and Jessica, Clinic Care Guide  ______________  Completed Care Conference Delegations from 11/9/17:  RN Will: Complete PCAM when scheduled  WALLY Will: DAREN  Care Guide Will: Schedule the patient to see her on 12/4/17 for meet and greet  11/9/17: I have placed the patient on my schedule 12/4/17 at 9:20 when patient is coming in to see Dr. Montelongo.  I will discuss [facility] enrollment at that time.  Delegation Completed

## 2021-06-14 NOTE — PROGRESS NOTES
The Clinic Care Guide called the patient today to follow up on her decision in regard to enrolling with Clinic Care Coordination. She declined enrollment at this time and stated that she doesn't feel she needs it right now.  If in the future she decides [facility] would be beneficial to her, she will contact me.  The patient has my direct number [phone].    I have sent a copy of this note to Dr. Montelongo notifying her of the patient's decision.

## 2021-06-16 PROBLEM — E66.09 CLASS 1 OBESITY DUE TO EXCESS CALORIES WITH SERIOUS COMORBIDITY AND BODY MASS INDEX (BMI) OF 31.0 TO 31.9 IN ADULT: Status: ACTIVE | Noted: 2018-11-14

## 2021-06-16 PROBLEM — I08.0 MITRAL REGURGITATION AND AORTIC STENOSIS: Status: ACTIVE | Noted: 2017-11-03

## 2021-06-16 PROBLEM — F32.5 MAJOR DEPRESSION IN COMPLETE REMISSION (H): Status: ACTIVE | Noted: 2018-09-30

## 2021-06-16 PROBLEM — E03.9 ACQUIRED HYPOTHYROIDISM: Status: ACTIVE | Noted: 2018-11-14

## 2021-06-16 PROBLEM — F41.9 ANXIETY: Status: ACTIVE | Noted: 2017-12-05

## 2021-06-16 PROBLEM — Z30.09 COUNSELING FOR BIRTH CONTROL, ORAL CONTRACEPTIVES: Status: ACTIVE | Noted: 2021-04-13

## 2021-06-16 PROBLEM — E66.811 CLASS 1 OBESITY DUE TO EXCESS CALORIES WITH SERIOUS COMORBIDITY AND BODY MASS INDEX (BMI) OF 31.0 TO 31.9 IN ADULT: Status: ACTIVE | Noted: 2018-11-14

## 2021-06-18 NOTE — PATIENT INSTRUCTIONS - HE
Patient Instructions by Sherrno Lutz MD at 4/13/2021  9:00 AM     Author: Sherron Lutz MD Service: -- Author Type: Physician    Filed: 4/13/2021  9:04 AM Encounter Date: 4/13/2021 Status: Signed    : Sherron Lutz MD (Physician)         Patient Education     Your Bodys Response to Anxiety    Normal anxiety is part of the bodys natural defense system. It's an alert to a threat that is unknown, vague, or comes from your own internal fears. While youre in this state, your feelings can range from a vague sense of worry to physical sensations such as a pounding heartbeat. These feelings make you want to react to the threat. An anxiety response is normal in many situations. But when you have an anxiety disorder, the same response can occur at the wrong times.  Anxiety can be helpful  Normal anxiety is a signal from your brain that warns you of a threat and is a normal response to help you prevent something or decrease the bad effects of something you can't control. For example, anxiety is a normal response to situations that might damage your body, separate you from a loved one, or lose your job. The symptoms of anxiety can be physical and mental.  How does it feel?  At certain times, people with anxiety may have:    Dizziness    Muscle tension or pain    Restlessness    Sleeplessness    Trouble concentrating    Racing heartbeat    Fast breathing    Shaking or trembling    Stomachache    Diarrhea    Loss of energy    Sweating    Cold, clammy hands    Chest pain    Dry mouth  Anxiety can also be a problem  Anxiety can become a problem when it is hard to control, occurs for months, and interferes with important parts of your life. With an anxiety disorder, your body has the response described above, but in inappropriate ways. The response a person has depends on the anxiety disorder he or she has. With some disorders, the anxiety is way out of proportion to the threat that triggers it. With others, anxiety may occur  "even when there isnt a clear threat or trigger.  Who does it affect?  Some people are more prone to persistent anxiety than others. It tends to run in families, and it affects more younger people than older people, and more women than men. But no age, race, or gender is immune to anxiety problems.  Anxiety can be treated  The good news is that the anxiety thats disrupting your life can be treated. Check with your healthcare provider and rule out any physical problems that may be causing the anxiety symptoms. If an anxiety disorder is diagnosed seek mental healthcare. This is an illness and it can respond to treatment. Most types of anxiety disorders will respond to \"talk therapy\" and medicines. Working with your doctor or other healthcare provider, you can develop skills to help you cope with anxiety. You can also gain the perspective you need to overcome your fears. Note: Good sources of support or guidance can be found at your local hospital, mental health clinic, or an employee assistance program.  How to cope with anxiety  If anxiety is wearing you down, here are some things you can do to cope:    Keep in mind that you cant control everything about a situation. Change what you can and let the rest take its course.    Exercise--its a great way to relieve tension and help your body feel relaxed.    Avoid caffeine and nicotine, which can make anxiety symptoms worse.    Fight the temptation to turn to alcohol or unprescribed drugs for relief. They only make things worse in the long run.    Educate yourself about anxiety disorders. Keep track of helpful online resources and books you can use during stressful periods.    Try stress management techniques such as meditation.    Consider online or in-person support groups.   Date Last Reviewed: 1/1/2017 2000-2019 [employer]. [address] [place] [address], [place] [address]. All rights reserved. This information is not intended as a substitute for professional " medical care. Always follow your healthcare professional's instructions.           Patient Education     Treating Anxiety Disorders with Medicine  An anxiety disorder can make you feel nervous or apprehensive, even without a clear reason. In people age 65 and older, generalized anxiety disorder is one of the most commonly diagnosed anxiety disorders. Many times it occurs with depression. Certain anxiety disorders can cause intense feelings of fear or panic. You may even have physical symptoms such as a racing heartbeat, sweating, or dizziness. If you have these feelings, you dont have to suffer anymore. Treatment to help you overcome your fears will likely include therapy (also called counseling). Medicine may also be prescribed to help control your symptoms.    Medicines  Certain medicines may be prescribed to help control your symptoms. So you may feel less anxious. You may also feel able to move forward with therapy. At first, medicines and dosages may need to be adjusted to find what works best for you. Try to be patient. Tell your healthcare provider how a medicine makes you feel. This way, you can work together to find the treatment thats best for you. Keep in mind that medicines can have side effects. Talk with your provider about any side effects that are bothering you. Changing the dose or type of medicine may help. Dont stop taking medicine on your own. That can cause symptoms to come back or cause dangerous withdrawal symptoms.    Anti-anxiety medicine. This medicine eases symptoms and helps you relax. Your healthcare provider will explain when and how to use it. It may be prescribed for use before situations that make you anxious. You may also be told to take medicine on a regular schedule. Anti-anxiety medicine may make you feel a little sleepy or out of it. Dont drive a car or operate machinery while on this medicine, until you know how it affects you.  Never use alcohol or other drugs with  anti-anxiety medicines. This could result in loss of muscular control, sedation, coma, or death. Also, use only the amount of medicine prescribed for you. If you think you may have taken too much, get emergency care right away. Never share your medications with others. Store these medications in a safe place that can't be accessed by children or visitors.  Keep taking medicines as prescribed  Never change your dosage, share or use another person's medicine, or stop taking your medicines without talking to your healthcare provider first. Keep the following in mind:    Some medicines must be taken on a schedule. Make this part of your daily routine. For instance, always take your pill before brushing your teeth. A pillbox can help you remember if youve taken your medicine each day.    Medicines are often taken for 6 to 12 months. Your healthcare provider will then evaluate whether you need to stay on them. Many people who have also had therapy may no longer need medicine to manage anxiety.    You may need to stop taking medicine slowly to give your body time to adjust. When its time to stop, your healthcare provider will tell you more. Remember: Never stop taking your medicine without talking to your provider first.    If symptoms return, you may need to start taking medicines again. This isnt your fault. Its just the nature of your anxiety disorder.    Side effects. Medicines may cause side effects. Ask your healthcare provider or pharmacist what you can expect. They may have ideas for avoiding some side effects.    Sexual problems. Some antidepressants can affect your desire for sex or your ability to have an orgasm. A change in dosage or medicine often solves the problem. If you have a sexual side effect that concerns you, tell your healthcare provider.    Addiction. If youve never had a problem with drugs or alcohol, you may not have a problem with medicines used to treat anxiety disorders. But always discuss the  medicines with your healthcare provider before taking them. If you have a history of addiction, you may not be able to use certain medicines used to treat anxiety disorders.    Medicine interactions. Always check with your pharmacist before using any over-the-counter medicines (OTCs), including herbal supplements. Some OTCs may interact with your anti-anxiety medications and increase or decrease their effectiveness.    Date Last Reviewed: 5/1/2017 2000-2019 [employer]. [address] [place] [address], [place] [address]. All rights reserved. This information is not intended as a substitute for professional medical care. Always follow your healthcare professional's instructions.

## 2021-06-18 NOTE — PATIENT INSTRUCTIONS - HE
Patient Instructions by Sherron Lutz MD at 2/17/2020 11:20 AM     Author: Sherron Lutz MD Service: -- Author Type: Physician    Filed: 2/17/2020 12:46 PM Encounter Date: 2/17/2020 Status: Signed    : Sherron Lutz MD (Physician)         Patient Education     Prevention Guidelines, Women Ages 18 to 39  Screening tests and vaccines are an important part of managing your health. A screening test is done to find possible disorders or diseases in people who don't have any symptoms. The goal is to find a disease early so lifestyle changes can be made and you can be watched more closely to reduce the risk of disease, or to detect it early enough to treat it most effectively. Screening tests are not considered diagnostic, but are used to determine if more testing is needed. Health counseling is essential, too. Below are guidelines for these, for women ages 18 to 39. Talk with your healthcare provider to make sure youre up-to-date on what you need.  Screening Who needs it How often   Alcohol misuse All women in this age group At routine exams   Blood pressure All women in this age group Yearly checkup if your blood pressure is normal  Normal blood pressure is less than 120/80 mm Hg  If your blood pressure reading is higher than normal, follow the advice of your healthcare provider   Breast cancer All women in this age group should talk with their healthcare providers about the need for clinical breast exams (CBE)1 Clinical breast exam every 3 years1   Cervical cancer Women ages 21 and older Women between ages 21 and 29 should have a Pap test every 3 years; women between ages 30 and 65 are advised to have a Pap test plus an HPV test every 5 years   Chlamydia Sexually active women ages 25 and younger, and women at increased risk for infection (such as having multiple sex partners) Every year if you're at risk or have symptoms   Depression All women in this age group At routine exams   Type 2 diabetes, prediabetes All  women with no symptoms who are overweight or obese and have 1 or more other risk factors for diabetes At least every 3 years. Also, testing for diabetes during pregnancy after the 24th week.    Type 2 diabetes, prediabetes All women diagnosed with gestational diabetes Lifelong testing every 3 years   Type 2 diabetes All women with prediabetes Every year   Gonorrhea Sexually active women at increased risk for infection At routine exams   Hepatitis C Anyone at increased risk At routine exams   HIV All women should be tested at least once for HIV between the ages of 13 and 64 At routine exams. Those with risk factors for HIV should be tested at least annually.    Obesity All women in this age group At routine exams   Syphilis Women at increased risk for infection should talk with their healthcare provider At routine exams   Tuberculosis Women at increased risk for infection should talk with their healthcare provider Ask your healthcare provider   Vision All women in this age group At least 1 complete exam in your 20s, and 2 in your 30s   Vaccine2 Who needs it How often   Chickenpox (varicella) All women in this age group who have no record of this infection or vaccine 2 doses; the second dose should be given 4 to 8 weeks after the first dose   Hepatitis A Women at increased risk for infection should talk with their healthcare provider 2 doses given at least 6 months apart   Hepatitis B Women at increased risk for infection should talk with their healthcare provider 3 doses over 6 months; second dose should be given 1 month after the first dose; the third dose should be given at least 2 months after the second dose and at least 4 months after the first dose   Haemophilus influenzae Type B (HIB) Women at increased risk for infection should talk with their healthcare provider 1 to 3 doses   Human papillomavirus (HPV) All women in this age group up to age 26 3 doses; the second dose should be given 1 to 2 months after the  first dose and the third dose given 6 months after the first dose   Influenza (flu) All women in this age group Once a year   Measles, mumps, rubella (MMR) All women in this age group who have no record of these infections or vaccines 1 or 2 doses   Meningococcal Women at increased risk for infection should talk with their healthcare provider 1 or more doses   Pneumococcal conjugate vaccine (PCV13) and pneumococcal polysaccharide vaccine (PPSV23) Women at increased risk for infection should talk with their healthcare provider PCV13: 1 dose ages 19 to 65 (protects against 13 types of pneumococcal bacteria)  PPSV23: 1 to 2 doses through age 64, or 1 dose at 65 or older (protects against 23 types of pneumococcal bacteria)      Tetanus/diphtheria/pertussis (Td/Tdap) booster All women in this age group Td every 10 years, or a one-time dose of Tdap instead of a Td booster after age 18, then Td every 10 years   Counseling Who needs it How often   BRCA gene mutation testing for breast and ovarian cancer susceptibility Women with increased risk for having gene mutation When your risk is known   Breast cancer and chemoprevention Women at high risk for breast cancer When your risk is known   Diet and exercise Women who are overweight or obese When diagnosed, and then at routine exams   Domestic violence Women at the age in which they are able to have children At routine exams   Sexually transmitted infection prevention Women who are sexually active At routine exams   Skin cancer Prevention of skin cancer in fair-skinned adults At routine exams   Use of tobacco and the health effects it can cause All women in this age group Every visit   1 According to the ACS, women ages 20 to 39 years should have a clinical breast exam (CBE) as part of their routine health exam every 3 years. Breast self-exams are an option for women starting in their 20s. But the USPSTF does not recommend CBE.  Date Last Reviewed: 10/1/2017    3846-1013 [employer]. [address] [place] [address], [place] [address]. All rights reserved. This information is not intended as a substitute for professional medical care. Always follow your healthcare professional's instructions.

## 2021-06-20 NOTE — LETTER
Letter by Sherron Lutz MD at      Author: Sherron uLtz MD Service: -- Author Type: --    Filed:  Encounter Date: 2/17/2020 Status: (Other)                    My Depression Action Plan  Name: Charlotte Blair   Date of Birth 2000  Date: 2/17/2020    My Doctor: Sherron Lutz MD   My Clinic: [facility]  [address] [facility] [place] [address]  [phone]          GREEN    ZONE   Good Control    What it looks like:     Things are going generally well. You have normal ups and downs. You may even feel depressed from time to time, but bad moods usually last less than a day.   What you need to do:  1. Continue to care for yourself (see self care plan)  2. Check your depression survival kit and update it as needed  3. Follow your physicians recommendations including any medication.  4. Do not stop taking medication unless you consult with your physician first.           YELLOW         ZONE Getting Worse    What it looks like:     Depression is starting to interfere with your life.     It may be hard to get out of bed; you may be starting to isolate yourself from others.    Symptoms of depression are starting to last most all day and this has happened for several days.     You may have suicidal thoughts but they are not constant.   What you need to do:     1. Call your care team. Your response to treatment will improve if you keep your care team informed of your progress. Yellow periods are signs an adjustment may need to be made.     2. Continue your self-care.  Just get dressed and ready for the day.  Don't give yourself time to talk yourself out of it.    3. Talk to someone in your support network.    4. Open up your depression Depression Self-Care Plan / Wellness kit.           RED    ZONE Medical Alert - Get Help    What it looks like:     Depression is seriously interfering with your life.     You may experience these or other symptoms: You cant get out of bed most days, cant work  or engage in other necessary activities, you have trouble taking care of basic hygiene, or basic responsibilities, thoughts of suicide or death that will not go away, self-injurious behavior.     What you need to do:  1. Call your care team and request a same-day appointment. If they are not available (weekends or after hours) call your local crisis line, emergency room or 911.            Self-Care Plan / Wellness Kit    Self-Care for Depression  Heres the deal. Your body and mind are really not as separate as most people think.  What you do and think affects how you feel and how you feel influences what you do and think. This means if you do things that people who feel good do, it will help you feel better.  Sometimes this is all it takes.  There is also a place for medication and therapy depending on how severe your depression is, so be sure to consult with your medical provider and/ or Behavioral Health Consultant if your symptoms are worsening or not improving.     In order to better manage my stress, I will:    Exercise  Get some form of exercise, every day. This will help reduce pain and release endorphins, the feel good chemicals in your brain. This is almost as good as taking antidepressants!  This is not the same as joining a gym and then never going! (they count on that by the way?) It can be as simple as just going for a walk or doing some gardening, anything that will get you moving.      Hygiene   Maintain good hygiene (get out of bed in the morning, make your bed, brush your teeth, take a shower, and get dressed like you were going to work, even if you are unemployed).  If your clothes don't fit try to get ones that do.    Diet  Strive to eat foods that are good for me, drink plenty of water, and avoid excessive sugar, caffeine, alcohol, and other mood-altering substances.  Some foods that are helpful in depression are: complex carbohydrates, B vitamins, flaxseed, fish or fish oil, fresh fruits and  vegetables.    Psychotherapy  Agree to participate in Individual Therapy (if recommended).    Medication  If prescribed medications, I agree to take them.  Missing doses can result in serious side effects.  I understand that drinking alcohol, or other illicit drug use, may cause potential side effects.  I will not stop my medication abruptly without first discussing it with my provider.    Staying Connected With Others  Stay in touch with my friends, family members, and my primary care provider/team.    Use your imagination  Be creative.  We all have a creative side; it doesnt matter if its oil painting, sand castles, or mud pies! This will also kick up the endorphins.    Witness Beauty  (AKA stop and smell the roses) Take a look outside, even in mid-winter. Notice colors, textures. Watch the squirrels and birds.     Service to others  Be of service to others.  There is always someone else in need.  By helping others we can get out of ourselves and remember the really important things.  This also provides opportunities for practicing all the other parts of the program.    Humor  Laugh and be silly!  Adjust your TV habits for less news and crime-drama and more comedy.    Control your stress  Try breathing deep, massage therapy, biofeedback, and meditation. Find time to relax each day.     Crisis Text Line  http://www.crisistextline.org    The Crisis Text Line serves anyone, in any type of crisis, providing access to free, 24/7 support and information via the medium people already use and trust:    Here's how it works:  1.  Text 977-960 from anywhere in the [place], anytime, about any type of crisis.  2.  A live, trained Crisis Counselor receives the text and responds quickly.  3.  The volunteer Crisis Counselor will help you move from a 'hot moment to a cool moment'.  My support system    Clinic Contact:  Phone number:    Contact 1:  Phone number:    Contact 2:  Phone number:    Congregation/:  Phone  number:    Therapist:  Phone number:    [facility] crisis center:    Phone number:    Other community support:  Phone number:

## 2021-06-20 NOTE — PROGRESS NOTES
ASSESSMENT:  1. Dysmenorrhea    2. Aortic stenosis  - Ambulatory referral to Cardiology    3. Horn's syndrome  - Ambulatory referral to Cardiology    4. Major depression in complete remission (H)      PLAN:  Regarding dysmenorrhea, garret discussion is that no OCP will help with cramping severity and period regulation if not taken on a regular basis.  I do not suggest changing OCP for Charlotte, but suggested using a alarm on her cell phone daily to remind her to take the pill.  It will be the most effective way to help regulate her period and reduce dysmenorrhea.  From a pregnancy prevention standpoint, I encouraged her ot continue with condoms as a 2nd means of contraceptive, particularly given her history of irregular taking of OCP.  She does not want a more permanent birth control option at this point, and with her Horn syndrome the estrogen component of OCP is serving purpose as well.  We discussed transition of care to adult medicine.  Referral was made for [facility] and specialty scheduling will help set up appt for her.  I have suggested to her that she start to work with an internist for her ongoing primary care needs.    I have suggested that she call [facility] to start to see Isabela Gibson MD if she is taking new patients.  Also, recommend starting care with gynecologist and recommendations given.    Patient Instructions   Alleve 2 pills on first day of period/ cramping ; followed by 1 pill every 12 hours    No midol.    Do pill on regular basis.    Adefris and Toppin: Leandro Wesley         Make an appointment for them to help set intiation of care    Dr. Isabela Gibson is an internal med MD at [facility] by [facility].      Orders Placed This Encounter   Procedures     Ambulatory referral to Cardiology     Referral Priority:   Routine     Referral Type:   Consultation     Referral Reason:   Evaluation and Treatment     Requested Specialty:   Cardiology     Number of  Visits Requested:   1     Medications Discontinued During This Encounter   Medication Reason     norgestimate-ethinyl estradiol (ORTHO TRI-CYCLEN, 28,) 0.18/0.215/0.25 mg-35 mcg (28) Tab tablet Reorder     sertraline (ZOLOFT) 50 MG tablet        No Follow-up on file.    CHIEF COMPLAINT:  Chief Complaint   Patient presents with     Contraception     getting cramps with the pill       HISTORY OF PRESENT ILLNESS:  Charlotte is a 18 y.o. female presenting to the clinic today for follow up of OCP management for dysmenorrhea.  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  She has had intermittent follow up in her teenage years, and essentially reestablished care last year with primary care and her specialists.  She is due for annual cardiology follow up and endocrine follow up.  She would like to transition to adult clinics.    For her dysmenorrhea, she feels that her cramps are still awful.  She has terrible cramping and nausea.  She is wondering if there is a different OCP that would work better for her.  She is in a sexual relationship with 1 male partner, and they have been in a relationship ongoing for quite some time.  She does not want to get pregnant at this time, but states that may change over the next few years, and she wants to keep with birth control options that keep her having a period.  She admits that she does not regulalry take her pills, likely takes 3 pills a week.  Her and her partner use condoms each time they have intercourse as a back up method.   She denies pain with intercourse, post coital bleeding or abnormal vaginal discharge.        REVIEW OF SYSTEMS:    All other systems are negative.    PFSH:  Charlotte did start sertraline for anxiety and depression symptoms about 1 year ago.  She took herself off of the  "sertraline, and states that she no longer is experiencing anxiety symptoms or depressive symptoms.  She is trying to exercise more to help with mood, and feels that it has been beneficial.  Charlotte has graduated from .  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  She will also be working at the [employer] part time during her schooling.  She is very excited about this.  Her father has passed away from MI.  She lives independently out of mothers home and in apartment with roomate    Past Medical History:   Diagnosis Date     Horn Syndrome     Created by Conversion        Family History   Problem Relation Age of Onset     Heart attack Father        No past surgical history on file.    Social History     Social History     Marital status: Single     Spouse name: N/A     Number of children: N/A     Years of education: N/A     Occupational History     Not on file.     Social History Main Topics     Smoking status: Never Smoker     Smokeless tobacco: Never Used     Alcohol use Not on file     Drug use: Not on file     Sexual activity: Not on file     Other Topics Concern     Not on file     Social History Narrative    Lives in apartment with roommate.        TOBACCO USE:  History   Smoking Status     Never Smoker   Smokeless Tobacco     Never Used       VITALS:  Vitals:    09/25/18 1118   BP: 124/60   Patient Site: Left Arm   Patient Position: Sitting   Cuff Size: Adult Large   Pulse: (!) 112   Temp: 97.9  F (36.6  C)   TempSrc: Oral   Weight: 169 lb 12.8 oz (77 kg)   Height: 5' 1\" (1.549 m)     Wt Readings from Last 3 Encounters:   09/25/18 169 lb 12.8 oz (77 kg)   08/25/18 165 lb (74.8 kg)   12/05/17 180 lb 14.4 oz (82.1 kg) (95 %, Z= 1.61)*     * Growth percentiles are based on CDC 2-20 Years data.     Body mass index is 32.08 kg/(m^2).    PHYSICAL EXAM:  General: Alert, no acute distress.   Eyes: PERRL, EOMI, Conjunctivae clear.  Ears: TMs " are without erythema, pus or fluid. Position and landmarks are normal.    Nose: Clear.    Throat: Oropharynx is moist and clear, without tonsillar hypertrophy, asymmetry, exudate or lesions.  Neck: Supple without lymphadenopathy or tenderness. No thyromegaly or nodules.  Lungs: Clear to auscultation bilaterally. No wheezes, rhonchi, or rales. No prolongation of expiratory phase. No tachypnea, retractions, or increased work of breathing.  Cardiac: Regular rate and rhythm,Grade 2/6 systolic ejection murmur with mid-systolic click audible over the left sternal border  Abdomen: Soft, nontender, nondistended. Bowel sounds present. No hepatosplenomegaly or mass palpable.  Skin: Clear without rashes or lesions.    TT spent 25 min> greater than 50 % in counseling on dysmenorrhea and transition of care goals.

## 2021-06-20 NOTE — LETTER
Letter by Tori Scott MD at      Author: Tori Scott MD Service: -- Author Type: --    Filed:  Encounter Date: 2/4/2020 Status: (Other)         Charlotte Blair  [address] [facility] [address]      February 4, 2020      Dear Charlotte,    This letter is to remind you that you will be due for your follow up appointment with Dr. Tori Scott in March, 2020 . To help ensure you are in the best health possible, a regular follow-up with your cardiologist is essential.     Please call our Patient Scheduling Line at [phone] to schedule your appointment at your earliest convenience.  If you have recently scheduled an appointment, please disregard this letter.    We look forward to seeing you again. As always, we are available at the number  above for any questions or concerns you may have.      Sincerely,     The Physicians and Staff of [facility]

## 2021-06-21 NOTE — PROGRESS NOTES
Chief Complaint: Establish care visit, discuss family planning    HPI: Ysabel is here alone today.  She is coming to transition from pediatric to adult care.  This is important given her diagnosis of Horn syndrome.  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  She notes that she should be seeing an endocrinologist, however she has not followed up with that.  Originally, she was seeing an endocrinologist who did start her on estrogen around the age of 13 which induced her first period.  She does not believe that they did any testing prior to see if she had any ovarian follicle reserve to see if she might be able to bear her own children with her own eggs.  She has known since her diagnosis, that she will likely be sterile, and that Horn syndrome is a high risk for pregnancy.  Yet, she is hopeful that she would be able to carry one child and then would like to adopt one child in the future.  She does not want to achieve pregnancy within the last next couple of years, however.  She does have a partner that she has been with for the last 6 months and having intercourse with him for the last 4 months.  She is currently on a combined oral contraceptive pill and they use condoms with each active intercourse.  She is aware that some women with Horn syndrome do have the ability to produce follicles, but more often, IVF with donor eggs is necessary.  She also understands that pregnancy would be high risk, especially given her cardiac diagnoses.  We discussed that as she gets closer to wanting to achieve pregnancy, that we would have her see reproductive endocrinology to assess her ability to produce follicles, IVF options, and also referred to maternal-fetal medicine for preconception counseling and how Horn syndrome would impact her pregnancy.  She is open to this.  She is also aware that if her thyroid hormone levels are elevated, she would need to have these adjusted with an  endocrinologist.  A referral was placed for her today.    We discussed that optimally, it is best in terms of VT E prevention, that she be on estrogen with cyclic progesterone as opposed to con bind oral contraception.  This is because COCs can increase the risk of clot formation.  JR's do provide contraception, however.  She is open to and changing her regimen and has become much more responsible at taking her pill every day.  She has not had any breakthrough bleeding.  We discussed taking micronized estrogen daily and then taking micronized progesterone for the first 12 days of each month.  If she is not able to tolerate this or complete the regimen, I would recommend a lower estrogen dose combined oral contraceptive like Loestrin 1/20.  She was on a similar regimen when she was first placed on estrogen.  She notes that she had a very bad cramping and vomiting with her cycles.  We discussed that this can be common in early menarche, so may not be the same if she goes back to this regimen.    She did have a cardiology visit yesterday and an echocardiogram was ordered.  She has yet to schedule this, does plan to do this.  She has no cardiac symptoms like fainting, dizziness, heart palpitations, or chest pain.    She has questions today about painful intercourse.  Her current partner is the second gentleman that she has had sex with.  She notes that she feels pain with intercourse and does not think it is related to position.  They have tried a few positions and it is the same regardless.  She does not feel the same pain when he inserts his fingers into her vagina.  She does use lubrication along with condoms with each active intercourse.  She feels that they do enough foreplay to allow for her own formation of lubrication.  She had similar discomfort with her previous boyfriend while having intercourse.  She does note that both men had fairly large penises which could contribute.  When asked if she thought that the  pain may be psychological, she reported that it could be in the fact that she is nervous that it will cause pain so that might increase the pain.  We discussed lower natural lubrication with low levels of estrogen, and I recommended twice weekly vaginal moisturizer as well as lubrication with each intercourse act.  I also recommended that they try using a sex toy that is smaller than his penis to see if this made a difference in terms of pain to help identify the issue.  This may also help her psychologically if it is used prior to his insertion of his own penis.  We discussed the benefit of her having a more dominant position to help control the depth and the rhythm and that this may help with the discomfort as well.    She does have anxiety.  She had previously been treated with Zoloft.  She went off her Zoloft and feels like her anxiety is well controlled.  She thinks that being on cyclic hormones helps her anxiety level as well.    We discussed recommended labs and procedures including: Thyroid levels, vitamin D, liver enzyme levels, hemoglobin A1c, lipid screening, screening for celiac disease, bone scan, audiology exam, and Pap smear with breast exam at age 19.  She accepts all of these labs and procedures with exception of audiology exam.  She reports that she is familiar with her own breast tissue and we discussed the importance of continuing this practice.  She declines a breast and pelvic exam today.    Encouraged healthy diet with at least 1000 mg of calcium per day and added protein.  Also recommended 20-30 minutes of exercise most days of the week or 2-3 hours/week.    Review of Systems - History obtained from chart review and the patient  General ROS: negative  Psychological ROS:positive for - anxiety  Ophthalmic ROS: negative  ENT ROS: negative  Endocrine ROS: positive for -amenorrhea if not on hormones, denies recent breakthrough bleeding  Breast ROS: negative for breast lumps  Respiratory ROS: no  cough, shortness of breath, or wheezing  Cardiovascular ROS: no chest pain or dyspnea on exertion, has murmur  Gastrointestinal ROS: no abdominal pain, change in bowel habits, or black or bloody stools  Genito-Urinary ROS: positive for - dyspareunia  Musculoskeletal ROS: negative  Neurological ROS: negative  Dermatological ROS: positive for skin lesion changes      Patient's last menstrual period was 10/26/2018 (exact date).  OB History    Para Term  AB Living   0 0 0 0 0 0   SAB TAB Ectopic Multiple Live Births   0 0 0 0 0           Past Medical History:   Diagnosis Date     Anxiety     previous on zoloft     Disease of thyroid gland     hypothyroxine     Horn Syndrome     Created by Conversion      Urinary tract infection     non recurrent     Past Surgical History:   Procedure Laterality Date     WISDOM TOOTH EXTRACTION       Family History   Problem Relation Age of Onset     Heart attack Father      Hypertension Mother      Ovarian cysts Sister      Hyperlipidemia Brother      No Medical Problems Maternal Grandmother      Heart attack Maternal Grandfather      Parkinsonism Paternal Grandmother      Suicidality Paternal Grandfather      Social History     Socioeconomic History     Marital status: Single     Spouse name: Not on file     Number of children: Not on file     Years of education: Not on file     Highest education level: Not on file   Social Needs     Financial resource strain: Not on file     Food insecurity - worry: Not on file     Food insecurity - inability: Not on file     Transportation needs - medical: Not on file     Transportation needs - non-medical: Not on file   Occupational History     Not on file   Tobacco Use     Smoking status: Never Smoker     Smokeless tobacco: Never Used   Substance and Sexual Activity     Alcohol use: No     Frequency: Never     Drug use: No     Sexual activity: Yes     Partners: Male     Birth control/protection: OCP, Condom   Other Topics Concern      Not on file   Social History Narrative    Lives in apartment with roommate.        Current Outpatient Medications:      norgestimate-ethinyl estradiol (ORTHO TRI-CYCLEN, 28,) 0.18/0.215/0.25 mg-35 mcg (28) Tab tablet, Take 1 tablet by mouth daily., Disp: 84 tablet, Rfl: 3     estradiol (ESTRACE) 1 MG tablet, Take 1 tablet (1 mg total) by mouth daily., Disp: 84 tablet, Rfl: 3     progesterone (PROMETRIUM) 200 MG capsule, Take one capsule for the first 12 days of each monthly cycle., Disp: 36 capsule, Rfl: 3  Allergies   Allergen Reactions     Amoxicillin Hives     Penicillins Hives       Objective:  General appearance - alert, well appearing, and in no distress  Mental status - alert, oriented to person, place, and time  Eyes - pupils equal, extraocular eye movements intact  Mouth - mucous membranes moist, pharynx normal without lesions  Neck - supple, no significant adenopathy, thyroid exam: thyroid is normal in size without nodules or tenderness  Lymphatics - no palpable lymphadenopathy, no hepatosplenomegaly  Chest - clear to auscultation, no wheezes, rales or rhonchi, symmetric air entry  Heart - normal rate and regular rhythm, murmur noted  Abdomen - soft, nontender, nondistended, no masses or organomegaly  Breasts - patient declines to have breast exam  Pelvic - exam declined by the patient  Back exam - full range of motion, no tenderness, palpable spasm or pain on motion  Neurological - alert, oriented, normal speech, no focal findings or movement disorder noted  Musculoskeletal - no joint tenderness, deformity or swelling  Extremities - grossly normal  Skin - normal coloration and turgor, no rashes, 1/2cm light brown raised lesion on right upper arm    PHQ-9: 11    Assessment:  Establish Care visit  Horn Syndrome mosaicism  Anxiety  Hypothyroid  Lesion on right arm  Dyspareunia    Plan:  TSH, T4, Vit D, ALT, AST, A1C, Lipid profile, TTGA, IgA, DXA scan  Dermatology referral  Reproductive endocrine  referral    Shantell Browning CNM

## 2021-06-26 NOTE — PROGRESS NOTES
Progress Notes by Tori Scott MD at 11/13/2018 10:30 AM     Author: Tori Scott MD Service: -- Author Type: Physician    Filed: 11/13/2018 11:03 AM Encounter Date: 11/13/2018 Status: Signed    : Tori Scott MD (Physician)           Click to link to [facility] NOTE    Thank you, Dr. Rowe, for asking me to see Charlotte Blair in consultation at [facility] to evaluate mitral valve prolapse with regurgitation.      Assessment/Plan:   Charlotte is 18 years old young woman with 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 is referred to [facility] for establishing adult cardiology care.  1.  Mitral valve prolapse with mild to moderate mitral valve regurgitation: Clinically, she has no symptoms.  Echocardiogram is requested for evaluation mitral valve structure and function, aortic valve structure and function, and also heart function and structure.  Encouraged her to eat regular exercise and loss of weight.    2.  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    3.  Charlotte's TSH was high, continue to follow-up with Dr. Montelongo.    Thank you for the opportunity to be involved in the care of Charlotte Blair. If you have any questions, please feel free to contact me.  I will see the patient again in 12 months.    Much or all of the text in this note was generated through the use of Dragon Dictate voice-to-text software. Errors in spelling or words which seem out of context are unintentional.   Sound alike errors, in particular, may have escaped editing.       History of Present Illness:   It is my pleasure to see Charlotte Blair at the [facility] for evaluation of Consult. Charlotte Blair is a 18 y.o. 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    The patient is referred to cardiology clinic for establishing adult cardiology care.  Clinically, she has been doing quite well.  She has no chest pain, shortness of breath, fatigue, dizziness, palpitations, orthopnea, PND or leg edema.  The patient has her routine activities without any symptoms.  Her blood pressure, heart rate are in normal range.    Past Medical History:     Patient Active Problem List   Diagnosis   ? Horn Syndrome   ? Pain During Urination (Dysuria)   ? Aortic stenosis   ? Mitral regurgitation and aortic stenosis   ? Anxiety   ? Major depression in complete remission (H)       Past Surgical History:   History reviewed. No pertinent surgical history.    Family History:     Family History   Problem Relation Age of Onset   ? Heart attack Father        Social History:    reports that  has never smoked. she has never used smokeless tobacco.    Review of Systems:   General: WNL  Eyes: WNL  Ears/Nose/Throat: WNL  Lungs: Snoring  Heart: WNL  Stomach: WNL  Bladder: WNL  Muscle/Joints: WNL  Skin: WNL  Nervous System: WNL  Mental Health: Anxiety     Blood: WNL    Meds:     Current Outpatient Medications:   ?  norgestimate-ethinyl estradiol (ORTHO TRI-CYCLEN, 28,) 0.18/0.215/0.25 mg-35 mcg (28) Tab tablet, Take 1 tablet by mouth daily., Disp: 84 tablet, Rfl: 3     Allergies:   Amoxicillin and Penicillins    Objective:      Physical Exam  169 lb (76.7 kg)  5' 1\" (1.549 m)  Body mass index is 31.93 kg/m .  /56 (Patient Site: Right Arm, Patient Position: Sitting, Cuff Size: Adult Large)   Pulse (!) 104   Resp 16   Ht 5' 1\" (1.549 m)   Wt 169 lb (76.7 kg)   BMI 31.93 kg/m      General Appearance:   Awake, Alert, No acute distress.   HEENT:  Pupil equal, reactive to light. No scleral icterus; the mucous membranes were moist. No oral ulcers or thrush.    Neck: No cervical bruits. No JVD. No thyromegaly. No lymph node enlargement or tenderness.   Chest: " The spine was straight. The chest was symmetric.   Lungs:   Respirations unlabored. Lungs are clear to auscultation. No crackles. No wheezing.   Cardiovascular:   RRR, normal first and second heart sounds with II/VI early to mid systolic murmurs at apex, I/VI systolic murmur at RUSB. No rubs or gallops.    Abdomen:  Obese. Soft. No tenderness. Non-distended. Bowels sounds are present   Extremities: Equal posterior tibial pulses. No leg edema.   Skin: No rashes or ulcers. Warm, Dry.   Musculoskeletal: No tenderness. No deformity.   Neurologic: Mood and affect are appropriate. No focal deficits.         Cardiac Imaging Studies  Reviewed her medical records from [facility]  Echocardiogram on December 4, 2017: Mitral valve prolapse with mild to moderate mitral valve regurgitation, mild left atrial enlargement 42 mm, mild left ventricular enlargement LVEDD 52.2 mm, normal aortic root dimension for PSA, sinus Valsalva 32 mm.  No change when compared to previous study in 2014.    Lab Review     Lab Results   Component Value Date    CHOL 196 11/03/2017    TRIG 63 11/03/2017    HDL 68 11/03/2017     Lab Results   Component Value Date    TSH 7.92 (H) 11/03/2017

## 2021-06-28 NOTE — PROGRESS NOTES
Progress Notes by Sherron Lutz MD at 2/17/2020 11:20 AM     Author: Sherron Lutz MD Service: -- Author Type: Physician    Filed: 2/17/2020 12:46 PM Encounter Date: 2/17/2020 Status: Signed    : Sherron Lutz MD (Physician)       FEMALE PREVENTATIVE EXAM    Assessment and Plan:       Charlotte was seen today for annual exam.    Routine general medical examination at a health care facility  I discussed the following with the patient:   Adult Healthy Living: Importance of regular exercise  Healthy nutrition  Getting adequate sleep  Stress management  Supplement use  Herbal medications/alternative medical therapies    Acquired hypothyroidism  Will Follow up on result treatment based on the result   -     Thyroid Stimulating Hormone (TSH)    GREGG (generalized anxiety disorder)  Not well controlled GREGG today 14 today , doesn't want to take any med willing to work with therapist , and almost done with the therapy school pretty excited to start  -     Ambulatory Referral to Integrated Primary Care/Mental Health    Mild depression (H)  Feeling little stressed but able to manage , work as  at [employer] and   Mitral regurgitation and aortic stenosis    Nonrheumatic aortic valve stenosis  Recommend Follow up with cardiology as soon as she can     Screen for STD (sexually transmitted disease)  -     Chlamydia trachomatis & Neisseria gonorrhoeae, Amplified Detection    Screening for cervical cancer    -     Gynecologic Cytology (PAP Smear)    Screening for metabolic disorder  -     Glycosylated Hemoglobin A1c  -     Lipid Cascade    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 Last Follow up 2 yr ago ,   Mitral valve prolapse with mild to moderate mitral valve regurgitation: Clinically, she has no symptoms.  per patient had echo done but unable to see the report does have sig murmur on exam Encouraged her to  eat regular exercise and loss of weight.      -     Glycosylated Hemoglobin A1c  -     Lipid Cascade  -     Thyroid Stimulating Hormone (TSH)  -     Hepatic Profile  -     Vitamin D, Total (25-Hydroxy)  -     norgestimate-ethinyl estradiol (ORTHO TRI-CYCLEN, 28,) 0.18/0.215/0.25 mg-35 mcg (28) tablet; Take 1 tablet by mouth daily.    Primary oligomenorrhea  Patient with known ibarra need estrogen supplement , oral contraceptive pills help regulate her cycle , never get her cycles without hormones       Next follow up:  1 yr for annual physical    Immunization Reviewed and if needed ordered please see A/P    There are no preventive care reminders to display for this patient.    Immunization History   Administered Date(s) Administered   ? DTaP, historic 10/02/2003   ? HPV Quadrivalent 07/20/2007, 03/30/2009   ? Hep B, Peds, Historic 11/20/2003   ? IPV 10/02/2003   ? Influenza, inj, historic,unspecified 09/01/2018   ? Influenza,seasonal,quad inj =/> 6months 11/03/2017   ? MMR 10/02/2003   ? Tdap 11/23/2014       The following high BMI interventions were performed this visit: dietary management education, guidance, and counseling    I have had an Advance Directives discussion with the patient.    Subjective:   Chief Complaint: Charlotte Blair is an 19 y.o. female here for a preventative health visit.     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  Dad had to boyfriend in her life.  She does complain of very painful sexual activity and not sure if she had complete penetration with either of the boyfriends.  Anxious about Pap smear today.  She does not get regular cycles until unless she is on hormone replacement in the form of OCPs patient was tried on estrogen progesterone cycling but did not like the side effect and never remember to take it as intended was  "doing much better on oral contraceptive pills like to refill the medication.  No follow-up with a cardiologist or endocrinologist for last 2 years denies any change in her health since then  Trying to do diet and exercise some weight fluctuation but no dramatic increase since last seen in 2018    Wt Readings from Last 3 Encounters:   02/17/20 175 lb 12.8 oz (79.7 kg)   11/14/18 170 lb 6.4 oz (77.3 kg)   11/13/18 169 lb (76.7 kg)         .  Patient's last menstrual period was 12/10/2019 (approximate).     No data recorded   No data recorded     Social History     Social History Narrative    Lives in apartment with roommate.       Healthy Habits  Are you taking a daily aspirin? No  Do you typically exercising at least 40 min, 3-4 times per week?  Yes  Do you usually eat at least 4 servings of fruit and vegetables a day, include whole grains and fiber and avoid regularly eating high fat foods? Yes \"could be better but trying\"  Have you had an eye exam in the past two years? Yes  Do you see a dentist twice per year? Yes  Do you have any concerns regarding sleep? No    Safety Screen  If you own firearms, are they secured in a locked gun cabinet or with trigger locks? The patient does not own any firearms    Do you feel you are safe where you are living?: Yes (2/17/2020 11:42 AM)  Do you feel you are safe in your relationship(s)?: Yes (2/17/2020 11:42 AM)      Review of Systems:  Please see above.  The rest of the review of systems are negative for all systems.     Pap History:   Yes - updated in Problem List and Health Maintenance accordingly    Cancer Screening       Status Date      PAP SMEAR Overdue 3/4/2019           Patient Care Team:  Sherron Lutz MD as PCP - General (Family Medicine)  Alayna Montelongo MD as Assigned PCP        History     Reviewed By Date/Time Sections Reviewed    Tanisha Adair CMA 2/17/2020 11:41 AM Tobacco            Objective:   Vital Signs:   Visit Vitals  /60 (Patient " "Site: Left Arm, Patient Position: Sitting, Cuff Size: Adult Large)   Pulse 92   Ht 5' 1.3\" (1.557 m)   Wt 175 lb 12.8 oz (79.7 kg)   LMP 12/10/2019 (Approximate) Comment: irregular cycles since stopping her BC pill. Denies any chance of pregnancy.    Breastfeeding No   BMI 32.89 kg/m         PHYSICAL EXAM  Physical Exam:  General Appearance:  Appears comfortable, Alert, cooperative, no distress,   Head: Normocephalic, without obvious abnormality, atraumatic  Eyes: PERRL, conjunctiva/corneas clear, EOM's intact, both eyes             Nose: Nares normal, no drainage   Throat: Lips, mucosa, and tongue normal; teeth and gums normal  Neck: Supple, symmetrical, trachea midline, no adenopathy;                      Lungs: Clear to auscultation bilaterally, respirations unlabored  Pelvic exam: normal external genitalia very scarce pubic hair localized around the vulvar only,  vagina very tight hymenal ring due to bleeding with the smallest speculum overallcervix, unable to do a bimanual exam as it was very quite painful, PAP: Pap smear done today,    Heart: Regular rate and rhythm,normal first and second heart sounds with II/VI early to mid systolic murmurs at apex, I/VI systolic murmur at RUSB. No rubs or gallops.   Abdomen: Soft, non-tender, bowel sounds active all four quadrants,   no masses, no organomegaly  Extremities: Extremities normal, atraumatic, no cyanosis or edema  Pulses: DP pulses are 1-2+ bilat.    Skin: no rashes or lesions  Neurologic: normal and equal strength bilat in upper and lower extremities                 Medication List          Accurate as of February 17, 2020 12:37 PM. If you have any questions, ask your nurse or doctor.            CONTINUE taking these medications    norgestimate-ethinyl estradiol 0.18/0.215/0.25 mg-35 mcg (28) tablet  Also known as:  Ortho Tri-Cyclen (28)  INSTRUCTIONS:  Take 1 tablet by mouth daily.           STOP taking these medications    estradiol 1 MG tablet  Also known " as:  Estrace  Stopped by:  Sherron Lutz MD     progesterone 200 MG capsule  Also known as:  Prometrium  Stopped by:  Sherron Lutz MD           Where to Get Your Medications      These medications were sent to [id number] - [place], [place] - [address] [facility]  [address], [facility] [address]    Hours:  24-hours Phone:  [phone]     norgestimate-ethinyl estradiol 0.18/0.215/0.25 mg-35 mcg (28) tablet         Additional Screenings Completed Today:

## 2021-06-30 NOTE — PROGRESS NOTES
Progress Notes by Sherron Lutz MD at 4/13/2021  9:00 AM     Author: Sherron Lutz MD Service: -- Author Type: Physician    Filed: 4/13/2021 11:06 AM Encounter Date: 4/13/2021 Status: Signed    : Sherron Lutz MD (Physician)       FEMALE PREVENTATIVE EXAM    Assessment and Plan:     Patient has been advised of split billing requirements and indicates understanding: Yes    Charlotte was seen today for annual exam.    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    Acquired hypothyroidism  Recheck the level today as does complain of palpitation off-and-on.  Which get worse when she is very anxious  -     Thyroid Stimulating Hormone (TSH)    Anxiety  Trial of Wellbutrin 1 tablet at bedtime for 1 week and then moved to the morning dose hopefully that will help some of the anxiety symptoms which are more bothersome than depression patient not interested in trying SSRI at this point but willing to do the therapy  -     AMB REFERRAL TO MENTAL HEALTH AND ADDICTION  - Adult (18+); Outpatient Treatment; Individual/Couples/Family/Group Therapy/Health Psychology; [facility]; [facility]; We will contact you to schedule the appointment or please c...  -     buPROPion (WELLBUTRIN XL) 150 MG 24 hr tablet; Take 1 tablet (150 mg total) by mouth every morning.    Horn Syndrome  Recommend continue follow-up yearly with the cardiologist  -     Ambulatory referral to  Cardiology    Mitral regurgitation and aortic stenosis  -Echocardiogram ordered today just to follow-up on her mitral regurgitation and aortic stenosis       Ambulatory referral to Cardiology  -     Echo Complete; Future    Nonrheumatic aortic valve stenosis  -     Ambulatory referral to Cardiology  -     Echo Complete; Future    Vitamin D deficiency  -     Vitamin D, Total (25-Hydroxy)    Screen for STD (sexually transmitted disease)  -     Chlamydia trachomatis & Neisseria gonorrhoeae, Amplified Detection    Lipid screening  -     Lipid [place]    Screening for diabetes mellitus  -     Glucose    Counseling for birth control, oral contraceptives  Continue birth control pills to regulate the cycle STD screening also done today.    Class 1 obesity due to excess calories with serious comorbidity and body mass index (BMI) of 31.0 to 31.9 in adult  Advised to work on daily some form of exercise which could be just walking daily.  Did able to lose 12 pounds since checked last year.  Wt Readings from Last 3 Encounters:   04/13/21 167 lb 8 oz (76 kg)   02/17/20 175 lb 12.8 oz (79.7 kg)   11/14/18 170 lb 6.4 oz (77.3 kg)         Next follow up:  1 yr for annual physical    Immunization Reviewed and if needed ordered please see A/P        Topic Date Due   ? HEPATITIS B VACCINES (2 of 3 - 3-dose primary series) 12/18/2003       Immunization History   Administered Date(s) Administered   ? DTaP, historic 10/02/2003   ? HPV Quadrivalent 07/20/2007, 03/30/2009   ? Hep B, Peds, Historic 11/20/2003   ? IPV 10/02/2003   ? Influenza, inj, historic,unspecified 09/01/2018   ? Influenza,seasonal,quad inj =/> 6months 11/03/2017   ? MMR 10/02/2003   ? Tdap 11/23/2014         The following high BMI interventions were performed this visit: dietary management education, guidance, and counseling    I have had an Advance Directives discussion with the patient.    Subjective:   Chief Complaint: Charlotte Blair is an 21 y.o. female here for a  preventative health visit.       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   boyfriend in her life.   oral contraceptive pills working well for regulating her cycles and also birth control . Patient does worry about not able to conceive with know history of turnner syndrome  Advise not to worry about yet when she decides to get pregnant we can always get the help from infertility specialist if there is will be hard time conceiving    Mitral valve prolapse/regurgitation/mild aortic stenosis no follow-up with a cardiologist or endocrinologist for last 2 years denies any change in her health since then  Trying to do diet and exercise some weight fluctuation but no dramatic increase since last seen in 2018    Depression anxiety: Not well controlled currently denies any significant extra stress in her life.  She usually worry about everything in her life constant thinking rethinking.  Has not seen a therapist or used any medication in the past    .  Patient's last menstrual period was 04/04/2021 (exact date).     No data recorded   No data recorded     Social History     Social History Narrative    Lives in apartment with roommate.     Works .    Single.      Healthy Habits  Are you taking a daily aspirin? No  Do you typically exercising at least 40 min, 3-4 times per week?  Yes  Do you usually eat at least 4 servings of fruit and vegetables a day, include whole grains and fiber and avoid regularly eating high fat foods? NO  Have you had an eye exam in the past two years? Yes  Do you see a dentist twice per year? NO  Do you have any concerns regarding sleep? YES    Safety Screen  If you own firearms, are they secured in a locked gun cabinet or with trigger locks? Yes    Do you feel you are safe where you are living?: Yes (4/13/2021  8:39 AM)  Do you feel you are safe in your  "relationship(s)?: Yes (4/13/2021  8:39 AM)      Review of Systems:  Please see above.  The rest of the review of systems are negative for all systems.     Pap History:   Yes - updated in Problem List and Health Maintenance accordingly    Cancer Screening       Status Date      PAP SMEAR Next Due 2/17/2023      Done 2/17/2020 GYNECOLOGIC CYTOLOGY (PAP SMEAR)          Patient Care Team:  Sherron Lutz MD as PCP - General (Family Medicine)  Sherron Lutz MD as Assigned PCP      History     Reviewed By Date/Time Sections Reviewed    Tanisha Adair CMA 4/13/2021  8:39 AM Tobacco             Objective:   Vital Signs:   Visit Vitals  /64 (Patient Site: Left Arm, Patient Position: Sitting, Cuff Size: Adult Large)   Pulse 96   Ht 5' 1.61\" (1.565 m)   Wt 167 lb 8 oz (76 kg)   LMP 04/04/2021 (Exact Date)   Breastfeeding No   BMI 31.02 kg/m         PHYSICAL EXAM  Physical Exam:  General Appearance:  Appears comfortable, Alert, cooperative, no distress,   Head: Normocephalic, without obvious abnormality, atraumatic  Eyes: PERRL, conjunctiva/corneas clear, EOM's intact, both eyes             Nose: Nares normal, no drainage   Throat: Lips, mucosa, and tongue normal; teeth and gums normal  Neck: Supple, symmetrical, trachea midline, no adenopathy;                      Lungs: Clear to auscultation bilaterally, respirations unlabored  Heart: Regular rate and rhythm, S1 and S2 normal, grade 2 MR murmur ,   Abdomen: Soft, non-tender, bowel sounds active all four quadrants,   no masses, no organomegaly  Extremities: Extremities normal, atraumatic, no cyanosis or edema  Pulses: DP pulses are 1-2+ bilat.    Skin: no rashes or lesions  Neurologic: normal and equal strength bilat in upper and lower extremities                 Medication List          Accurate as of April 13, 2021 11:02 AM. If you have any questions, ask your nurse or doctor.            START taking these medications    buPROPion 150 MG 24 hr tablet  Also known " as: Wellbutrin XL  INSTRUCTIONS: Take 1 tablet (150 mg total) by mouth every morning.  Started by: Sherron Lutz MD           CONTINUE taking these medications    cholecalciferol (vitamin D3) 50 mcg (2,000 unit) Tab  Also known as: Vitamin D3  INSTRUCTIONS: One tab daily        ergocalciferol 1,250 mcg (50,000 unit) capsule  Also known as: ERGOCALCIFEROL  INSTRUCTIONS: 1 cap 2 times per wk for total 24 dose        norgestimate-ethinyl estradioL 0.18/0.215/0.25 mg-35 mcg (28) tablet  Also known as: Ortho Tri-Cyclen (28)  INSTRUCTIONS: Take 1 tablet by mouth daily.              Where to Get Your Medications      [id number] - [place], [place] - [address] [facility] [address] [facility]  [address], [facility] [address]    Hours: 24-hours Phone: [phone]     buPROPion 150 MG 24 hr tablet         Additional Screenings Completed Today:

## 2021-07-03 NOTE — ADDENDUM NOTE
Addendum Note by Sherron Lutz MD at 2/17/2020 11:20 AM     Author: Sherron Lutz MD Service: -- Author Type: Physician    Filed: 2/19/2020  1:22 PM Encounter Date: 2/17/2020 Status: Signed    : Sherron Lutz MD (Physician)    Addended by: SHERRNO LUTZ on: 2/19/2020 01:22 PM        Modules accepted: Orders

## 2021-07-04 NOTE — ADDENDUM NOTE
Addendum Note by Sherron Lutz MD at 4/13/2021  9:00 AM     Author: Sherron Lutz MD Service: -- Author Type: Physician    Filed: 4/15/2021  1:19 PM Encounter Date: 4/13/2021 Status: Signed    : Sherron Lutz MD (Physician)    Addended by: SHERRON LUTZ on: 4/15/2021 01:19 PM        Modules accepted: Orders

## 2021-07-14 PROBLEM — I34.0 NON-RHEUMATIC MITRAL REGURGITATION: Status: RESOLVED | Noted: 2018-11-14 | Resolved: 2018-11-14

## 2021-07-29 ENCOUNTER — TELEPHONE (OUTPATIENT)
Dept: FAMILY MEDICINE | Facility: CLINIC | Age: 21
End: 2021-07-29

## 2021-07-29 DIAGNOSIS — Q96.9 GONADAL DYSGENESIS: ICD-10-CM

## 2021-07-29 RX ORDER — NORGESTIMATE AND ETHINYL ESTRADIOL 7DAYSX3 28
1 KIT ORAL DAILY
Qty: 84 TABLET | Refills: 3 | Status: SHIPPED | OUTPATIENT
Start: 2021-07-29 | End: 2021-08-20

## 2021-07-29 NOTE — TELEPHONE ENCOUNTER
7-29-21  Reason for Call:  medication refill:    Do you use a [facility]? [facility]    Name of the medication requested: norgestimate-ethinyl estradiol (ORTHO TRI-CYCLEN, 28,) 0.18/0.215/0.25 mg-35 mcg (28) tablet    Other request: pt called stated when she had her last PX 4-13-21 Dr Lutz never called her birthcontrol, pt has been out for 3 months    Can we leave a detailed message on this number? YES    Phone number patient can be reached at: Cell number on file:    Telephone Information:   Mobile [phone]       Best Time: anytime    Call taken on 7/29/2021 at 10:11 AM by Mago Ellington

## 2021-07-29 NOTE — TELEPHONE ENCOUNTER
Last prescribed: 2/17/2020  Last Px: 4/13/21    Fely GARCIA CMA ([facility])

## 2021-08-16 ENCOUNTER — OFFICE VISIT (OUTPATIENT)
Dept: FAMILY MEDICINE | Facility: CLINIC | Age: 21
End: 2021-08-16
Payer: COMMERCIAL

## 2021-08-16 VITALS
HEART RATE: 76 BPM | SYSTOLIC BLOOD PRESSURE: 118 MMHG | TEMPERATURE: 98.3 F | BODY MASS INDEX: 30.56 KG/M2 | WEIGHT: 165 LBS | DIASTOLIC BLOOD PRESSURE: 68 MMHG

## 2021-08-16 DIAGNOSIS — M25.531 PAIN IN BOTH WRISTS: Primary | ICD-10-CM

## 2021-08-16 DIAGNOSIS — M25.532 PAIN IN BOTH WRISTS: Primary | ICD-10-CM

## 2021-08-16 PROCEDURE — 99214 OFFICE O/P EST MOD 30 MIN: CPT | Performed by: FAMILY MEDICINE

## 2021-08-16 RX ORDER — IBUPROFEN 600 MG/1
600 TABLET, FILM COATED ORAL 3 TIMES DAILY
Qty: 90 TABLET | Refills: 0 | Status: SHIPPED | OUTPATIENT
Start: 2021-08-16 | End: 2021-08-20

## 2021-08-16 ASSESSMENT — PATIENT HEALTH QUESTIONNAIRE - PHQ9: SUM OF ALL RESPONSES TO PHQ QUESTIONS 1-9: 13

## 2021-08-16 NOTE — LETTER
August 16, 2021      Charlotte Blair  [address] [facility] [address] [facility] [address]        To Whom It May Concern:    Charlotte Blair  was seen on 08/16/2021 for medical concern.  Please allow her to reduce her hours by about 2 hours / day for about 2 week to help her recover.        Sincerely,        Chito Coppola MD

## 2021-08-20 ASSESSMENT — ENCOUNTER SYMPTOMS
RESPIRATORY NEGATIVE: 1
BACK PAIN: 0
NECK STIFFNESS: 0
NECK PAIN: 0
CARDIOVASCULAR NEGATIVE: 1
JOINT SWELLING: 0
FATIGUE: 0
PARESTHESIAS: 0
FEVER: 0
GASTROINTESTINAL NEGATIVE: 1
CHILLS: 0

## 2021-08-20 NOTE — PROGRESS NOTES
"    Assessment & Plan     Pain in both wrists  I think that the pain that she is a complained about is secondary to overuse.  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  Because of that I have given some exercises that she can do, and also encouraged her to rest it.  She was given some time off from work to be able to rest the hand.  If there is no improvement at the end of the time of she is to call to let us know and possibly follow-up for further evaluation.       BMI:   Estimated body mass index is 30.56 kg/m  as calculated from the following:    Height as of 4/13/21: 1.565 m (5' 1.61\").    Weight as of this encounter: 74.8 kg (165 lb).           No follow-ups on file.    Chito Coppola MD  [facility]    Syeda Porter is a 21 year old who presents for the following health issues   HPI   She comes in with concerns of having bilateral wrist pain which appears to be worse on the right side.  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  She noted no injuries.  And noted that the pain is worse towards the end of the day and in the morning there is mild pain.  Also noticed some stiffness.  She does not have any numbness to the hands.  There is no neck pain.  There is no shoulder pain.  She also wondered about getting labs for her thyroid.  She does have a lab that is already ordered by her primary provider.  She will recheck that later.  But she noted that she is doing well otherwise.  Used to take medication for anxiety and depression but not anymore as she is feeling better.      Family History   Problem Relation Age of Onset     Coronary Artery Disease Father      Hypertension Mother      Ovarian cysts Sister      Hyperlipidemia Brother      No Known Problems Maternal Grandmother      Coronary Artery Disease Maternal Grandfather      Parkinsonism Paternal Grandmother      Suicidality Paternal Grandfather       Social " History     Socioeconomic History     Marital status: Single     Spouse name: Not on file     Number of children: Not on file     Years of education: Not on file     Highest education level: Not on file   Occupational History     Not on file   Tobacco Use     Smoking status: Never Smoker     Smokeless tobacco: Never Used   Substance and Sexual Activity     Alcohol use: No     Drug use: No     Sexual activity: Yes     Partners: Male     Birth control/protection: OCP, Condom   Other Topics Concern     Not on file   Social History Narrative    Lives in apartment with roommate.   Works .  Single.       Social Determinants of Health     Financial Resource Strain:      Difficulty of Paying Living Expenses:    Food Insecurity:      Worried About Running Out of Food in the Last Year:      Ran Out of Food in the Last Year:    Transportation Needs:      Lack of Transportation (Medical):      Lack of Transportation (Non-Medical):    Physical Activity:      Days of Exercise per Week:      Minutes of Exercise per Session:    Stress:      Feeling of Stress :    Social Connections:      Frequency of Communication with Friends and Family:      Frequency of Social Gatherings with Friends and Family:      Attends [employer] Services:      Active Member of Clubs or Organizations:      Attends Club or Organization Meetings:      Marital Status:    Intimate Partner Violence:      Fear of Current or Ex-Partner:      Emotionally Abused:      Physically Abused:      Sexually Abused:       Past Surgical History:   Procedure Laterality Date     WISDOM TOOTH EXTRACTION        Past Medical History:   Diagnosis Date     Anxiety     previous on zoloft     Disease of thyroid gland     hypothyroxine     Gonadal dysgenesis     Created by Conversion      Urinary tract infection     non recurrent          Review of Systems   Constitutional: Negative for chills, fatigue and fever.   HENT: Negative.    Respiratory: Negative.    Cardiovascular:  Negative.    Gastrointestinal: Negative.    Musculoskeletal: Negative for back pain, joint swelling, neck pain and neck stiffness.   Neurological: Negative for paresthesias.            Objective    /68   Pulse 76   Temp 98.3  F (36.8  C)   Wt 74.8 kg (165 lb)   LMP 08/04/2021 (Approximate)   BMI 30.56 kg/m    Body mass index is 30.56 kg/m .  Physical Exam  Vitals reviewed.   Constitutional:       Appearance: Normal appearance.   Cardiovascular:      Rate and Rhythm: Normal rate and regular rhythm.   Pulmonary:      Effort: Pulmonary effort is normal.      Breath sounds: Normal breath sounds.   Musculoskeletal:         General: No swelling or tenderness. Normal range of motion.      Comments: There is normal range of motion noted on both wrists.  There is no swelling noted.  Range of motion is normal with the fingers as well.   Neurological:      Mental Status: She is alert.

## 2021-10-19 PROBLEM — F32.9 MAJOR DEPRESSION: Status: ACTIVE | Noted: 2018-09-30

## 2021-12-07 ENCOUNTER — VIRTUAL VISIT (OUTPATIENT)
Dept: FAMILY MEDICINE | Facility: CLINIC | Age: 21
End: 2021-12-07
Payer: COMMERCIAL

## 2021-12-07 DIAGNOSIS — U07.1 INFECTION DUE TO 2019 NOVEL CORONAVIRUS: Primary | ICD-10-CM

## 2021-12-07 PROCEDURE — 99213 OFFICE O/P EST LOW 20 MIN: CPT | Mod: 95 | Performed by: FAMILY MEDICINE

## 2021-12-07 NOTE — PROGRESS NOTES
How would you like to obtain your AVS?   If the video visit is dropped, the invitation should be resent by: Text to cell phone: [phone]  Will anyone else be joining your video visit? No      Problem List Items Addressed This Visit     None      Visit Diagnoses     Infection due to 2019 novel coronavirus    -  Primary        Patient's symptoms have improved. Note provided to return to work at the end of her 10 day isolation period.    Subjective: Charlotte is a 21 year old who is being evaluated via billable video visit.  Visit converted to phone visit due to poor Wifi availability at the clinic. She reports that she tested positive for COVID on December 1st. She reports that she had fatigue, chills, rhinorrhea, fever. She is better at this time. Her symptoms started on November 30th. She works as a .    Objective:   Vitals: No vitals were obtained today due to virtual visit.  Patient is awake and alert in no apparent distress. Answering questions appropriately. No cough or congestion evident during visit.      Video-Visit Details    Type of service:  Video Visit - converted to telephone visit    Telephone time: 5 minutes    Originating Location (pt. Location): Home    Distant Location (provider location):  [facility]     Platform used for Video Visit: Unable to complete video visit

## 2021-12-07 NOTE — LETTER
December 7, 2021      Charlotte Blair  [address] [id number]        To Whom It May Concern:    Charlotte Blair was seen today. Please excuse from work 11/30/2021 - 12/9/2021. She may return to work without restrictions on 12/10/2021.      Sincerely,        MONSE DIAZ MD

## 2022-01-14 ENCOUNTER — OFFICE VISIT (OUTPATIENT)
Dept: FAMILY MEDICINE | Facility: CLINIC | Age: 22
End: 2022-01-14
Payer: COMMERCIAL

## 2022-01-14 ENCOUNTER — TELEPHONE (OUTPATIENT)
Dept: FAMILY MEDICINE | Facility: CLINIC | Age: 22
End: 2022-01-14

## 2022-01-14 VITALS
WEIGHT: 171.3 LBS | HEIGHT: 60 IN | SYSTOLIC BLOOD PRESSURE: 134 MMHG | BODY MASS INDEX: 33.63 KG/M2 | DIASTOLIC BLOOD PRESSURE: 69 MMHG | OXYGEN SATURATION: 97 % | HEART RATE: 94 BPM

## 2022-01-14 DIAGNOSIS — R30.0 DYSURIA: ICD-10-CM

## 2022-01-14 DIAGNOSIS — Z00.00 ROUTINE GENERAL MEDICAL EXAMINATION AT A HEALTH CARE FACILITY: Primary | ICD-10-CM

## 2022-01-14 DIAGNOSIS — F32.5 MAJOR DEPRESSION IN COMPLETE REMISSION (H): ICD-10-CM

## 2022-01-14 DIAGNOSIS — E03.9 ACQUIRED HYPOTHYROIDISM: ICD-10-CM

## 2022-01-14 DIAGNOSIS — N89.8 VAGINAL DISCHARGE: ICD-10-CM

## 2022-01-14 DIAGNOSIS — Z30.09 COUNSELING FOR BIRTH CONTROL, ORAL CONTRACEPTIVES: ICD-10-CM

## 2022-01-14 LAB
ALBUMIN SERPL-MCNC: 4.1 G/DL (ref 3.5–5)
ALBUMIN UR-MCNC: NEGATIVE MG/DL
ALP SERPL-CCNC: 58 U/L (ref 45–120)
ALT SERPL W P-5'-P-CCNC: 30 U/L (ref 0–45)
ANION GAP SERPL CALCULATED.3IONS-SCNC: 8 MMOL/L (ref 5–18)
APPEARANCE UR: CLEAR
AST SERPL W P-5'-P-CCNC: 27 U/L (ref 0–40)
BILIRUB SERPL-MCNC: 0.5 MG/DL (ref 0–1)
BILIRUB UR QL STRIP: NEGATIVE
BUN SERPL-MCNC: 13 MG/DL (ref 8–22)
CALCIUM SERPL-MCNC: 9.7 MG/DL (ref 8.5–10.5)
CHLORIDE BLD-SCNC: 104 MMOL/L (ref 98–107)
CHOLEST SERPL-MCNC: 189 MG/DL
CLUE CELLS: ABNORMAL
CO2 SERPL-SCNC: 25 MMOL/L (ref 22–31)
COLOR UR AUTO: YELLOW
CREAT SERPL-MCNC: 0.61 MG/DL (ref 0.6–1.1)
FASTING STATUS PATIENT QL REPORTED: NORMAL
GFR SERPL CREATININE-BSD FRML MDRD: >90 ML/MIN/1.73M2
GLUCOSE BLD-MCNC: 89 MG/DL (ref 70–125)
GLUCOSE UR STRIP-MCNC: NEGATIVE MG/DL
HDLC SERPL-MCNC: 64 MG/DL
HGB UR QL STRIP: ABNORMAL
KETONES UR STRIP-MCNC: NEGATIVE MG/DL
LDLC SERPL CALC-MCNC: 105 MG/DL
LEUKOCYTE ESTERASE UR QL STRIP: NEGATIVE
NITRATE UR QL: NEGATIVE
PH UR STRIP: 7 [PH] (ref 5–8)
POTASSIUM BLD-SCNC: 4.5 MMOL/L (ref 3.5–5)
PROT SERPL-MCNC: 6.8 G/DL (ref 6–8)
RBC #/AREA URNS AUTO: NORMAL /HPF
SODIUM SERPL-SCNC: 137 MMOL/L (ref 136–145)
SP GR UR STRIP: >=1.03 (ref 1–1.03)
TRICHOMONAS, WET PREP: ABNORMAL
TRIGL SERPL-MCNC: 99 MG/DL
TSH SERPL DL<=0.005 MIU/L-ACNC: 4.02 UIU/ML (ref 0.3–5)
UROBILINOGEN UR STRIP-ACNC: 0.2 E.U./DL
WBC #/AREA URNS AUTO: NORMAL /HPF
WBC'S/HIGH POWER FIELD, WET PREP: ABNORMAL
YEAST, WET PREP: ABNORMAL

## 2022-01-14 PROCEDURE — 84443 ASSAY THYROID STIM HORMONE: CPT | Performed by: FAMILY MEDICINE

## 2022-01-14 PROCEDURE — 99214 OFFICE O/P EST MOD 30 MIN: CPT | Mod: 25 | Performed by: FAMILY MEDICINE

## 2022-01-14 PROCEDURE — 81001 URINALYSIS AUTO W/SCOPE: CPT | Performed by: FAMILY MEDICINE

## 2022-01-14 PROCEDURE — 80061 LIPID PANEL: CPT | Performed by: FAMILY MEDICINE

## 2022-01-14 PROCEDURE — 80053 COMPREHEN METABOLIC PANEL: CPT | Performed by: FAMILY MEDICINE

## 2022-01-14 PROCEDURE — 96127 BRIEF EMOTIONAL/BEHAV ASSMT: CPT | Performed by: FAMILY MEDICINE

## 2022-01-14 PROCEDURE — 87210 SMEAR WET MOUNT SALINE/INK: CPT | Performed by: FAMILY MEDICINE

## 2022-01-14 PROCEDURE — 99395 PREV VISIT EST AGE 18-39: CPT | Performed by: FAMILY MEDICINE

## 2022-01-14 PROCEDURE — 36415 COLL VENOUS BLD VENIPUNCTURE: CPT | Performed by: FAMILY MEDICINE

## 2022-01-14 RX ORDER — NORGESTIMATE AND ETHINYL ESTRADIOL 7DAYSX3 28
1 KIT ORAL DAILY
Qty: 84 TABLET | Refills: 2 | Status: SHIPPED | OUTPATIENT
Start: 2022-01-14 | End: 2023-09-25

## 2022-01-14 RX ORDER — NORGESTIMATE AND ETHINYL ESTRADIOL 7DAYSX3 28
1 KIT ORAL DAILY
COMMUNITY
End: 2022-01-14

## 2022-01-14 RX ORDER — METRONIDAZOLE 7.5 MG/G
1 GEL VAGINAL DAILY
Qty: 70 G | Refills: 0 | Status: SHIPPED | OUTPATIENT
Start: 2022-01-14 | End: 2022-10-25

## 2022-01-14 ASSESSMENT — MIFFLIN-ST. JEOR: SCORE: 1453.51

## 2022-01-14 NOTE — PROGRESS NOTES
SUBJECTIVE:   CC: Charlotte Blair is an 21 year old woman who presents for preventive health visit.     She is here for physical exam and to have refill of medication.  She also has intermittent pain with urination.There is also some associated vaginal discharge that she noted mostly in the morning, slightly itchy.  She has been doing well, demyelinating activity.  Patient has been advised of split billing requirements and indicates understanding: Yes  Healthy Habits:     Getting at least 3 servings of Calcium per day:  NO    Bi-annual eye exam:  Yes    Dental care twice a year:  NO    Sleep apnea or symptoms of sleep apnea:  Excessive snoring    Diet:  Regular (no restrictions)    Frequency of exercise:  None    Taking medications regularly:  Yes    Medication side effects:  None    PHQ-2 Total Score: 2    Additional concerns today:  Yes  Reviewed her health maintenance needs.    Today's PHQ-2 Score:   PHQ-2 ( 1999 Pfizer) 1/14/2022   Q1: Little interest or pleasure in doing things 1   Q2: Feeling down, depressed or hopeless 1   PHQ-2 Score 2   Q1: Little interest or pleasure in doing things Several days   Q2: Feeling down, depressed or hopeless Several days   PHQ-2 Score 2       Abuse: Current or Past (Physical, Sexual or Emotional) - No  Do you feel safe in your environment? Yes    Have you ever done Advance Care Planning? (For example, a Health Directive, POLST, or a discussion with a medical provider or your loved ones about your wishes): No, advance care planning information given to patient to review.  Advanced care planning was discussed at today's visit.    Social History     Tobacco Use     Smoking status: Never Smoker     Smokeless tobacco: Never Used   Substance Use Topics     Alcohol use: No     If you drink alcohol do you typically have >3 drinks per day or >7 drinks per week? No    Alcohol Use 1/14/2022   Prescreen: >3 drinks/day or >7 drinks/week? No   Prescreen: >3 drinks/day or >7 drinks/week?  -       Reviewed orders with patient.  Reviewed health maintenance and updated orders accordingly - Yes  Lab work is in process    Breast Cancer Screening:        History of abnormal Pap smear: None  PAP / HPV Latest Ref Rng & Units 2/17/2020   PAP Negative for squamous intraepithelial lesion or malignancy. Negative for squamous intraepithelial lesion or malignancy  Electronically signed by Mikala Harman CT (ASCP) on 2/19/2020 at  2:48 PM       Reviewed and updated as needed this visit by clinical staff  Tobacco  Allergies  Meds             Reviewed and updated as needed this visit by Provider    Meds            Family History   Problem Relation Age of Onset     Coronary Artery Disease Father      Hypertension Mother      Ovarian cysts Sister      Hyperlipidemia Brother      No Known Problems Maternal Grandmother      Coronary Artery Disease Maternal Grandfather      Parkinsonism Paternal Grandmother      Suicidality Paternal Grandfather       Social History     Socioeconomic History     Marital status: Single     Spouse name: Not on file     Number of children: Not on file     Years of education: Not on file     Highest education level: Not on file   Occupational History     Not on file   Tobacco Use     Smoking status: Never Smoker     Smokeless tobacco: Never Used   Substance and Sexual Activity     Alcohol use: No     Drug use: No     Sexual activity: Yes     Partners: Male     Birth control/protection: OCP, Condom   Other Topics Concern     Not on file   Social History Narrative    Lives in apartment with roommate.   Works .  Single.       Social Determinants of Health     Financial Resource Strain: Not on file   Food Insecurity: Not on file   Transportation Needs: Not on file   Physical Activity: Not on file   Stress: Not on file   Social Connections: Not on file   Intimate Partner Violence: Not on file   Housing Stability: Not on file      Past Surgical History:   Procedure Laterality Date      WISDOM TOOTH EXTRACTION        Past Medical History:   Diagnosis Date     Anxiety     previous on zoloft     Disease of thyroid gland     hypothyroxine     Gonadal dysgenesis     Created by Conversion      Urinary tract infection     non recurrent          Review of Systems  CONSTITUTIONAL: NEGATIVE for fever, chills, change in weight  INTEGUMENTARU/SKIN: NEGATIVE for worrisome rashes, moles or lesions  EYES: NEGATIVE for vision changes or irritation  ENT: NEGATIVE for ear, mouth and throat problems  RESP: NEGATIVE for significant cough or SOB  BREAST: NEGATIVE for masses, tenderness or discharge  CV: NEGATIVE for chest pain, palpitations or peripheral edema  GI: NEGATIVE for nausea, abdominal pain, heartburn, or change in bowel habits  : NEGATIVE for unusual urinary or vaginal symptoms. Periods are regular.  MUSCULOSKELETAL: NEGATIVE for significant arthralgias or myalgia  NEURO: NEGATIVE for weakness, dizziness or paresthesias  PSYCHIATRIC: NEGATIVE for changes in mood or affect     OBJECTIVE:   /69 (BP Location: Left arm, Patient Position: Sitting, Cuff Size: Adult Regular)   Pulse 94   Ht 1.524 m (5')   Wt 77.7 kg (171 lb 4.8 oz)   LMP 12/12/2021 (Approximate)   SpO2 97%   BMI 33.45 kg/m    Physical Exam  GENERAL: healthy, alert and no distress  EYES: Eyes grossly normal to inspection, PERRL and conjunctivae and sclerae normal  HENT: ear canals and TM's normal, nose and mouth without ulcers or lesions  NECK: no adenopathy, no asymmetry, masses, or scars and thyroid normal to palpation  RESP: lungs clear to auscultation - no rales, rhonchi or wheezes  CV: regular rate and rhythm, normal S1 S2, no S3 or S4, no murmur, click or rub, no peripheral edema and peripheral pulses strong  ABDOMEN: soft, nontender, no hepatosplenomegaly, no masses and bowel sounds normal   (female): Tight vagina with normal introitus. Uncomfortable as such full exam was not done regarding the genitals.  MS: no gross  musculoskeletal defects noted, no edema  SKIN: no suspicious lesions or rashes  NEURO: Normal strength and tone, mentation intact and speech normal  PSYCH: mentation appears normal, affect normal/bright      ASSESSMENT/PLAN:   Charlotte was seen today for physical.    Diagnoses and all orders for this visit:    Routine general medical examination at a health care facility  -     Lipid Profile; Future  -     Comprehensive metabolic panel (BMP + Alb, Alk Phos, ALT, AST, Total. Bili, TP); Future  -     Lipid Profile  -     Comprehensive metabolic panel (BMP + Alb, Alk Phos, ALT, AST, Total. Bili, TP)    Dysuria  -     UA Macro with Reflex to Micro and Culture - lab collect; Future  -     UA Macro with Reflex to Micro and Culture - lab collect  -     Urine Microscopic    Major depression in complete remission (H)    Vaginal discharge  -     Wet prep - Clinic Collect  -     metroNIDAZOLE (METROGEL) 0.75 % vaginal gel; Place 1 applicator (5 g) vaginally daily    Acquired hypothyroidism  -     TSH with free T4 reflex; Future  -     TSH with free T4 reflex    Counseling for birth control, oral contraceptives  -     norgestim-eth estrad triphasic (ORTHO TRI-CYCLEN) 0.18/0.215/0.25 MG-35 MCG tablet; Take 1 tablet by mouth daily    Medications were refilled as requested.  She does have a 2+ WBC in the vaginal swab but normal urine.  I will go ahead and treat her with metronidazole topical for vaginitis.  Birth control was refilled.  Labs were also ordered.    Patient has been advised of split billing requirements and indicates understanding: Yes  COUNSELING:  Reviewed preventive health counseling, as reflected in patient instructions       Regular exercise       Healthy diet/nutrition    Estimated body mass index is 33.45 kg/m  as calculated from the following:    Height as of this encounter: 1.524 m (5').    Weight as of this encounter: 77.7 kg (171 lb 4.8 oz).    Weight management plan: Discussed healthy diet and exercise  guidelines    She reports that she has never smoked. She has never used smokeless tobacco.      Counseling Resources:  ATP IV Guidelines  Pooled Cohorts Equation Calculator  Breast Cancer Risk Calculator  BRCA-Related Cancer Risk Assessment: FHS-7 Tool  FRAX Risk Assessment  ICSI Preventive Guidelines  Dietary Guidelines for Americans, 2010  USDA's MyPlate  ASA Prophylaxis  Lung CA Screening    Chito Coppola MD  [facility]

## 2022-01-18 ENCOUNTER — TELEPHONE (OUTPATIENT)
Dept: FAMILY MEDICINE | Facility: CLINIC | Age: 22
End: 2022-01-18
Payer: COMMERCIAL

## 2022-01-18 NOTE — TELEPHONE ENCOUNTER
Reason for Call:  Patient calling and would like to discuss her thyroid results.  Patient states that she has had hypothyroid her whole life and would like to know why or how her TSH is normal.      Phone Number Patient can be reached at: Cell number on file:    Telephone Information:   Mobile [phone]       Best Time: anytime    Can we leave a detailed message on this number? YES    Call taken on 1/18/2022 at 12:17 PM by Rosana Ortiz

## 2022-01-20 NOTE — TELEPHONE ENCOUNTER
Thyroid hormone does fluctuate throughout her life, is not unusual to have a normal thyroid hormone level .  As 9-month ago it was barely upper limit of abnormal somebody probably self adjusted   I believe if I am correct you were not taking any medication at the time of thyroid hormone check as I do not see any medication on the list    Sherron Lutz MD 1/20/2022 5:09 PM   [facility].  [phone]

## 2022-01-25 ENCOUNTER — TELEPHONE (OUTPATIENT)
Dept: FAMILY MEDICINE | Facility: CLINIC | Age: 22
End: 2022-01-25
Payer: COMMERCIAL

## 2022-01-25 NOTE — TELEPHONE ENCOUNTER
----- Message from Chito Coppola MD sent at 1/17/2022  6:35 PM CST -----  Please inform the patient that the labs that was done for her physical exam were all normal.  There was no urine infection, and I have sent in a prescription for the vaginal discharge which I hope will be getting better at this time.  The cholesterol level, the TSH, the kidney and liver function were all normal.  Please let me know if she has any questions.

## 2022-08-25 ENCOUNTER — OFFICE VISIT (OUTPATIENT)
Dept: CARDIOLOGY | Facility: CLINIC | Age: 22
End: 2022-08-25
Attending: FAMILY MEDICINE
Payer: COMMERCIAL

## 2022-08-25 VITALS
RESPIRATION RATE: 16 BRPM | WEIGHT: 175 LBS | DIASTOLIC BLOOD PRESSURE: 54 MMHG | BODY MASS INDEX: 34.18 KG/M2 | HEART RATE: 113 BPM | SYSTOLIC BLOOD PRESSURE: 146 MMHG

## 2022-08-25 DIAGNOSIS — I34.1 MITRAL VALVE PROLAPSE: ICD-10-CM

## 2022-08-25 DIAGNOSIS — R00.2 PALPITATIONS: ICD-10-CM

## 2022-08-25 DIAGNOSIS — R00.0 TACHYCARDIA: Primary | ICD-10-CM

## 2022-08-25 LAB
ATRIAL RATE - MUSE: 85 BPM
DIASTOLIC BLOOD PRESSURE - MUSE: NORMAL MMHG
INTERPRETATION ECG - MUSE: NORMAL
P AXIS - MUSE: 23 DEGREES
PR INTERVAL - MUSE: 138 MS
QRS DURATION - MUSE: 96 MS
QT - MUSE: 374 MS
QTC - MUSE: 445 MS
R AXIS - MUSE: 97 DEGREES
SYSTOLIC BLOOD PRESSURE - MUSE: NORMAL MMHG
T AXIS - MUSE: 12 DEGREES
VENTRICULAR RATE- MUSE: 85 BPM

## 2022-08-25 PROCEDURE — 99204 OFFICE O/P NEW MOD 45 MIN: CPT | Performed by: INTERNAL MEDICINE

## 2022-08-25 PROCEDURE — 93000 ELECTROCARDIOGRAM COMPLETE: CPT | Performed by: GENERAL ACUTE CARE HOSPITAL

## 2022-08-25 NOTE — PROGRESS NOTES
CARDIOLOGY CLINIC CONSULT NOTE     Assessment/Plan:   1.  Mitral valve prolapse with insufficiency.  We will recheck an echocardiogram to look for change in severity or indications for intervention.  We discussed that regular monitoring is important to prevent irreversible changes in cardiac function.  2.  Palpitations.  Etiology uncertain.  Will check 24-hour Holter monitor  3.  Hypertension.  We will start metoprolol succinate 25 mg daily.  This may help with palpitations as well.     History of Present Illness:     It is my pleasure to see Charlotte Blair at the [facility] clinic for evaluation of mitral valve prolapse with insufficiency.    Charlotte Blair is a 22 year old female with a past medical history of Horn syndrome, followed at [facility] until 3 years ago when she saw my partner Dr. Scott.  At that time an echocardiogram was requested, apparently never completed.  She has a history of mitral valve prolapse with mitral insufficiency.  There does not appear to be any history of significant aortic stenosis, though chart notes suggest otherwise.  She returns today to reestablish care.    She reports that she feels nervous today, but otherwise has been well.  She has noticed palpitations described as a racing heartbeat particularly in the mornings  following activity.  She does not notice these occurring during activity but feels that perhaps her activity tolerance is decreased versus a year ago.  She has had no syncope or falls, or exertional chest pain.  She does workout in the gym several days a week either on the treadmill or swimming or elliptical machine.      Past Medical History:     Patient Active Problem List   Diagnosis     Horn Syndrome     Aortic stenosis     Mitral regurgitation and aortic stenosis     Anxiety     Major depression in complete remission (H)     Acquired hypothyroidism     Class 1 obesity due to excess calories with serious comorbidity and body  mass index (BMI) of 31.0 to 31.9 in adult     Counseling for birth control, oral contraceptives     Major depression in complete remission (H)       Past Surgical History:     Past Surgical History:   Procedure Laterality Date     WISDOM TOOTH EXTRACTION         Family History:     Family History   Problem Relation Age of Onset     Coronary Artery Disease Father      Hypertension Mother      Ovarian cysts Sister      Hyperlipidemia Brother      No Known Problems Maternal Grandmother      Coronary Artery Disease Maternal Grandfather      Parkinsonism Paternal Grandmother      Suicidality Paternal Grandfather          Social History:    reports that she has never smoked. She has never used smokeless tobacco. She reports that she does not drink alcohol and does not use drugs.    Exercise: Works out in the gym several times a week doing treadmill, swimming    Sleep: Restorative supine    Meds:     Current Outpatient Medications   Medication Sig Dispense Refill     norgestim-eth estrad triphasic (ORTHO TRI-CYCLEN) 0.18/0.215/0.25 MG-35 MCG tablet Take 1 tablet by mouth daily 84 tablet 2     metroNIDAZOLE (METROGEL) 0.75 % vaginal gel Place 1 applicator (5 g) vaginally daily (Patient not taking: Reported on 8/25/2022) 70 g 0       Allergies:   Amoxicillin and Penicillins    Review of Systems:     Positive for nosebleeds in the winter.  Shortness of breath with activity, chest pains, snoring, palpitations following activity, anxiety.  12 point review of systems otherwise negative     Please refer above for cardiac ROS details.       Objective:      Physical Exam  79.4 kg (175 lb)     Body mass index is 34.18 kg/m .  BP (!) 146/54 (BP Location: Left arm, Patient Position: Sitting, Cuff Size: Adult Regular)   Pulse 113   Resp 16   Wt 79.4 kg (175 lb)   BMI 34.18 kg/m          General Appearance : Awake, Alert, No acute distress  HEENT: No Scleral icterus; the mucous membranes were pink and moist.  Conjunctivae not  injected  Neck:  No cervical bruits, jugular venous distention, or thyromegaly  Chest: The spine was straight. Chest wall symmetric  Lungs: Respirations unlabored; the lungs are clear to auscultation.  No wheezing   Cardiovascular:   Normal point of maximal impulse.  Auscultation reveals regular first and second heart sounds with 2/6 blowing systolic murmur lower left sternal border.  3/6 mid-to-late systolic murmur audible at the cardiac apex which radiates posteriorly through to the back.  Carotid, radial, and dorsalis pedal pulses are intact and symmetric.  Abdomen: No organomegaly, masses, bruits, or tenderness. Bowels sounds are present  Extremities: No edema.  Skin: No xanthelasma. Warm, Dry.  Musculoskeletal: No tenderness.  Neurologic: Alert and oriented ×3. Speech is fluent.      EKG (personally reviewed):  Today: Sinus rhythm 85 bpm.  Rightward axis.    Cardiac Imaging Studies:  [facility]ocardiogram 2017 at [facility]:  Echocardiogram on December 4, 2017: Mitral valve prolapse with mild to moderate mitral valve regurgitation, mild left atrial enlargement 42 mm, mild left ventricular enlargement LVEDD 52.2 mm, normal aortic root dimension for PSA, sinus Valsalva 32 mm.  No change when compared to previous study in 2014.       Lab Review   Lab Results   Component Value Date     01/14/2022    CO2 25 01/14/2022    BUN 13 01/14/2022     No results found for: WBC, HGB, HCT, MCV, PLT  Lab Results   Component Value Date    CHOL 189 01/14/2022    TRIG 99 01/14/2022    HDL 64 01/14/2022     No results found for: TROPONINI  No results found for: BNP  Lab Results   Component Value Date    TSH 4.02 01/14/2022       Hussain Garcia MD, MD FACC      This note created using Dragon voice recognition software. Sound alike errors may have escaped editing.

## 2022-08-25 NOTE — PATIENT INSTRUCTIONS
Schedule 24-hour Holter monitor to help define what the palpitations represent.  Be active during the monitoring period in an effort to provoke the palpitations.  Complete the diary when you experience any symptoms or activities.  We will schedule an echocardiogram to look again at your heart valves and pulm function.  We will also look at the start of the aorta.  Get a blood pressure cuff and monitor your pressures.  We may want to start you on blood pressure lowering medications.  Target getting 150 minutes of moderate aerobic activities each week, such as walking.  Quit vaping, consider using nicotine patch to help in doing so  Look forward to seeing you back in 1 year or sooner if significant abnormalities on testing are present.  Start metoprolol succinate 25 mg daily to help control blood pressure and possibly the palpitations as well.

## 2022-08-25 NOTE — LETTER
8/25/2022    Chito Coppola MD  No address on file    RE: Charlotte Blair       Dear Colleague,     I had the pleasure of seeing Charlotte Blair in the [facility].    CARDIOLOGY CLINIC CONSULT NOTE     Assessment/Plan:   1.  Mitral valve prolapse with insufficiency.  We will recheck an echocardiogram to look for change in severity or indications for intervention.  We discussed that regular monitoring is important to prevent irreversible changes in cardiac function.  2.  Palpitations.  Etiology uncertain.  Will check 24-hour Holter monitor  3.  Hypertension.  We will start metoprolol succinate 25 mg daily.  This may help with palpitations as well.     History of Present Illness:     It is my pleasure to see Charlotte Blair at the [facility] clinic for evaluation of mitral valve prolapse with insufficiency.    Charlotte Blair is a 22 year old female with a past medical history of Horn syndrome, followed at [facility] until 3 years ago when she saw my partner Dr. Scott.  At that time an echocardiogram was requested, apparently never completed.  She has a history of mitral valve prolapse with mitral insufficiency.  There does not appear to be any history of significant aortic stenosis, though chart notes suggest otherwise.  She returns today to reestablish care.    She reports that she feels nervous today, but otherwise has been well.  She has noticed palpitations described as a racing heartbeat particularly in the mornings  following activity.  She does not notice these occurring during activity but feels that perhaps her activity tolerance is decreased versus a year ago.  She has had no syncope or falls, or exertional chest pain.  She does workout in the gym several days a week either on the treadmill or swimming or elliptical machine.      Past Medical History:     Patient Active Problem List   Diagnosis     Horn Syndrome     Aortic stenosis     Mitral  regurgitation and aortic stenosis     Anxiety     Major depression in complete remission (H)     Acquired hypothyroidism     Class 1 obesity due to excess calories with serious comorbidity and body mass index (BMI) of 31.0 to 31.9 in adult     Counseling for birth control, oral contraceptives     Major depression in complete remission (H)       Past Surgical History:     Past Surgical History:   Procedure Laterality Date     WISDOM TOOTH EXTRACTION         Family History:     Family History   Problem Relation Age of Onset     Coronary Artery Disease Father      Hypertension Mother      Ovarian cysts Sister      Hyperlipidemia Brother      No Known Problems Maternal Grandmother      Coronary Artery Disease Maternal Grandfather      Parkinsonism Paternal Grandmother      Suicidality Paternal Grandfather          Social History:    reports that she has never smoked. She has never used smokeless tobacco. She reports that she does not drink alcohol and does not use drugs.    Exercise: Works out in the gym several times a week doing treadmill, swimming    Sleep: Restorative supine    Meds:     Current Outpatient Medications   Medication Sig Dispense Refill     norgestim-eth estrad triphasic (ORTHO TRI-CYCLEN) 0.18/0.215/0.25 MG-35 MCG tablet Take 1 tablet by mouth daily 84 tablet 2     metroNIDAZOLE (METROGEL) 0.75 % vaginal gel Place 1 applicator (5 g) vaginally daily (Patient not taking: Reported on 8/25/2022) 70 g 0       Allergies:   Amoxicillin and Penicillins    Review of Systems:     Positive for nosebleeds in the winter.  Shortness of breath with activity, chest pains, snoring, palpitations following activity, anxiety.  12 point review of systems otherwise negative     Please refer above for cardiac ROS details.       Objective:      Physical Exam  79.4 kg (175 lb)     Body mass index is 34.18 kg/m .  BP (!) 146/54 (BP Location: Left arm, Patient Position: Sitting, Cuff Size: Adult Regular)   Pulse 113   Resp 16    Wt 79.4 kg (175 lb)   BMI 34.18 kg/m          General Appearance : Awake, Alert, No acute distress  HEENT: No Scleral icterus; the mucous membranes were pink and moist.  Conjunctivae not injected  Neck:  No cervical bruits, jugular venous distention, or thyromegaly  Chest: The spine was straight. Chest wall symmetric  Lungs: Respirations unlabored; the lungs are clear to auscultation.  No wheezing   Cardiovascular:   Normal point of maximal impulse.  Auscultation reveals regular first and second heart sounds with 2/6 blowing systolic murmur lower left sternal border.  3/6 mid-to-late systolic murmur audible at the cardiac apex which radiates posteriorly through to the back.  Carotid, radial, and dorsalis pedal pulses are intact and symmetric.  Abdomen: No organomegaly, masses, bruits, or tenderness. Bowels sounds are present  Extremities: No edema.  Skin: No xanthelasma. Warm, Dry.  Musculoskeletal: No tenderness.  Neurologic: Alert and oriented ×3. Speech is fluent.      EKG (personally reviewed):  Today: Sinus rhythm 85 bpm.  Rightward axis.    Cardiac Imaging Studies:  Echocardiogram 2017 at [facility]:  Echocardiogram on December 4, 2017: Mitral valve prolapse with mild to moderate mitral valve regurgitation, mild left atrial enlargement 42 mm, mild left ventricular enlargement LVEDD 52.2 mm, normal aortic root dimension for PSA, sinus Valsalva 32 mm.  No change when compared to previous study in 2014.       Lab Review   Lab Results   Component Value Date     01/14/2022    CO2 25 01/14/2022    BUN 13 01/14/2022     No results found for: WBC, HGB, HCT, MCV, PLT  Lab Results   Component Value Date    CHOL 189 01/14/2022    TRIG 99 01/14/2022    HDL 64 01/14/2022     No results found for: TROPONINI  No results found for: BNP  Lab Results   Component Value Date    TSH 4.02 01/14/2022       Hussain Garcia MD, MD FACC      This note created using Dragon voice recognition software. Sound alike  errors may have escaped editing.       Thank you for allowing me to participate in the care of your patient.      Sincerely,     Hussain Garcia MD     [facility]    cc:   Sherron Lutz MD  [address] [place] [address]

## 2022-09-08 ENCOUNTER — HOSPITAL ENCOUNTER (OUTPATIENT)
Dept: CARDIOLOGY | Facility: CLINIC | Age: 22
Discharge: HOME | End: 2022-09-08
Attending: INTERNAL MEDICINE
Payer: COMMERCIAL

## 2022-09-08 DIAGNOSIS — R00.0 TACHYCARDIA: ICD-10-CM

## 2022-09-08 DIAGNOSIS — I34.1 MITRAL VALVE PROLAPSE: ICD-10-CM

## 2022-09-08 DIAGNOSIS — R00.2 PALPITATIONS: ICD-10-CM

## 2022-09-08 LAB — LVEF ECHO: NORMAL

## 2022-09-08 PROCEDURE — 93226 XTRNL ECG REC<48 HR SCAN A/R: CPT

## 2022-09-08 PROCEDURE — 93306 TTE W/DOPPLER COMPLETE: CPT

## 2022-09-08 PROCEDURE — 93306 TTE W/DOPPLER COMPLETE: CPT | Mod: 26 | Performed by: INTERNAL MEDICINE

## 2022-09-13 PROCEDURE — 93227 XTRNL ECG REC<48 HR R&I: CPT | Performed by: INTERNAL MEDICINE

## 2022-09-21 ENCOUNTER — TELEPHONE (OUTPATIENT)
Dept: CARDIOLOGY | Facility: CLINIC | Age: 22
End: 2022-09-21

## 2022-09-21 DIAGNOSIS — R00.2 PALPITATIONS: ICD-10-CM

## 2022-09-21 DIAGNOSIS — I34.1 MITRAL VALVE PROLAPSE: Primary | ICD-10-CM

## 2022-09-21 DIAGNOSIS — R00.0 TACHYCARDIA: ICD-10-CM

## 2022-09-21 NOTE — TELEPHONE ENCOUNTER
----- Message from Hussain Garcia MD sent at 9/20/2022  4:14 PM CDT -----  Results reviewed.  Heart muscle function looks good but severity of mitral regurgitation appears to be increased.  Holter monitor shows no concerning rhythm problems.  Please schedule transesophageal echocardiogram to better assess severity of mitral insufficiency and follow-up with me after the study is completed, or in 6 months regardless. cmc

## 2022-09-23 NOTE — TELEPHONE ENCOUNTER
[facility] Call Center    Phone Message    May a detailed message be left on voicemail: yes     Reason for Call: Other: Patient calling to speak with a nurse about her EMETERIO results. Please call her back to discuss, thanks!     Action Taken: Other: Cardiology    Travel Screening: Not Applicable

## 2022-09-30 NOTE — TELEPHONE ENCOUNTER
Hi,  Please call patient's mom, Silvia, to schedule EMETERIO. She is willing to help and awaiting call to schedule.   Thank you!

## 2022-09-30 NOTE — TELEPHONE ENCOUNTER
Called patient's mother, Silvia, with results and recommendations. Informed patient's mom, Silvia, several messages have been left for Charlotte after she stated she (Charlotte) has been awaiting results.     Silvia is anxious to schedule EMETERIO for Charlotte. Assured her a member of our scheduling team will call her to arrange. Understanding verbalized and she is thankful for the call.  -----------------------------------------

## 2022-10-04 ENCOUNTER — PREP FOR PROCEDURE (OUTPATIENT)
Dept: CARDIOLOGY | Facility: CLINIC | Age: 22
End: 2022-10-04

## 2022-10-04 DIAGNOSIS — I34.1 MITRAL VALVE PROLAPSE: Primary | ICD-10-CM

## 2022-10-04 DIAGNOSIS — R00.0 TACHYCARDIA: ICD-10-CM

## 2022-10-04 DIAGNOSIS — R00.2 PALPITATIONS: ICD-10-CM

## 2022-10-04 RX ORDER — SODIUM CHLORIDE 9 MG/ML
INJECTION, SOLUTION INTRAVENOUS CONTINUOUS
Status: CANCELLED | OUTPATIENT
Start: 2022-10-04

## 2022-10-04 RX ORDER — LIDOCAINE 40 MG/G
CREAM TOPICAL
Status: CANCELLED | OUTPATIENT
Start: 2022-10-04

## 2022-10-20 PROBLEM — F32.9 MAJOR DEPRESSION: Status: RESOLVED | Noted: 2018-09-30 | Resolved: 2022-10-20

## 2022-10-20 PROBLEM — I34.1 MITRAL VALVE PROLAPSE: Status: ACTIVE | Noted: 2022-10-20

## 2022-10-25 ENCOUNTER — OFFICE VISIT (OUTPATIENT)
Dept: FAMILY MEDICINE | Facility: CLINIC | Age: 22
End: 2022-10-25
Payer: COMMERCIAL

## 2022-10-25 VITALS
BODY MASS INDEX: 32.42 KG/M2 | OXYGEN SATURATION: 98 % | HEIGHT: 62 IN | WEIGHT: 176.19 LBS | SYSTOLIC BLOOD PRESSURE: 130 MMHG | DIASTOLIC BLOOD PRESSURE: 81 MMHG | HEART RATE: 76 BPM

## 2022-10-25 DIAGNOSIS — Z01.818 PREOP GENERAL PHYSICAL EXAM: ICD-10-CM

## 2022-10-25 DIAGNOSIS — N76.0 BACTERIAL VAGINITIS: ICD-10-CM

## 2022-10-25 DIAGNOSIS — B96.89 BACTERIAL VAGINITIS: ICD-10-CM

## 2022-10-25 DIAGNOSIS — R30.0 DYSURIA: Primary | ICD-10-CM

## 2022-10-25 DIAGNOSIS — N76.0 VAGINITIS AND VULVOVAGINITIS: ICD-10-CM

## 2022-10-25 DIAGNOSIS — Z11.3 SCREEN FOR STD (SEXUALLY TRANSMITTED DISEASE): ICD-10-CM

## 2022-10-25 DIAGNOSIS — I34.1 MITRAL VALVE PROLAPSE: ICD-10-CM

## 2022-10-25 LAB

## 2022-10-25 PROCEDURE — 81003 URINALYSIS AUTO W/O SCOPE: CPT | Mod: QW | Performed by: FAMILY MEDICINE

## 2022-10-25 PROCEDURE — 87591 N.GONORRHOEAE DNA AMP PROB: CPT | Performed by: FAMILY MEDICINE

## 2022-10-25 PROCEDURE — 87210 SMEAR WET MOUNT SALINE/INK: CPT | Mod: QW | Performed by: FAMILY MEDICINE

## 2022-10-25 PROCEDURE — 87491 CHLMYD TRACH DNA AMP PROBE: CPT | Performed by: FAMILY MEDICINE

## 2022-10-25 PROCEDURE — 99213 OFFICE O/P EST LOW 20 MIN: CPT | Performed by: FAMILY MEDICINE

## 2022-10-25 RX ORDER — METRONIDAZOLE 7.5 MG/G
1 GEL VAGINAL DAILY
Qty: 70 G | Refills: 0 | Status: ON HOLD | OUTPATIENT
Start: 2022-10-25 | End: 2022-10-27

## 2022-10-25 ASSESSMENT — PATIENT HEALTH QUESTIONNAIRE - PHQ9
SUM OF ALL RESPONSES TO PHQ QUESTIONS 1-9: 9
SUM OF ALL RESPONSES TO PHQ QUESTIONS 1-9: 9
10. IF YOU CHECKED OFF ANY PROBLEMS, HOW DIFFICULT HAVE THESE PROBLEMS MADE IT FOR YOU TO DO YOUR WORK, TAKE CARE OF THINGS AT HOME, OR GET ALONG WITH OTHER PEOPLE: SOMEWHAT DIFFICULT

## 2022-10-25 NOTE — PATIENT INSTRUCTIONS
Preparing for Your Surgery  Getting started  A nurse will call you to review your health history and instructions. They will give you an arrival time based on your scheduled surgery time. Please be ready to share:    Your doctor's clinic name and phone number    Your medical, surgical and anesthesia history    A list of allergies and sensitivities    A list of medicines, including herbal treatments and over-the-counter drugs    Whether the patient has a legal guardian (ask how to send us the papers in advance)  Please tell us if you're pregnant--or if there's any chance you might be pregnant. Some surgeries may injure a fetus (unborn baby), so they require a pregnancy test. Surgeries that are safe for a fetus don't always need a test, and you can choose whether to have one.   If you have a child who's having surgery, please ask for a copy of Preparing for Your Child's Surgery.    Preparing for surgery    Within 10 to 30 days of surgery: Have a pre-op exam (sometimes called an H&P, or History and Physical). This can be done at a clinic or pre-operative center.  ? If you're having a , you may not need this exam. Talk to your care team.    At your pre-op exam, talk to your care team about all medicines you take. If you need to stop any medicines before surgery, ask when to start taking them again.  ? We do this for your safety. Many medicines can make you bleed too much during surgery. Some change how well surgery (anesthesia) drugs work.    Call your insurance company to let them know you're having surgery. (If you don't have insurance, call [phone].)    Call your clinic if there's any change in your health. This includes signs of a cold or flu (sore throat, runny nose, cough, rash, fever). It also includes a scrape or scratch near the surgery site.    If you have questions on the day of surgery, call your hospital or surgery center.  COVID testing  You may need to be tested for COVID-19 before having  surgery. If so, we will give you instructions (or click here).  Eating and drinking guidelines  For your safety: Unless your surgeon tells you otherwise, follow the guidelines below.    Eat and drink as usual until 8 hours before surgery. After that, no food or milk.    Drink clear liquids until 2 hours before surgery. These are liquids you can see through, like water, Gatorade and Propel Water. You may also have black coffee and tea (no cream or milk).    Nothing by mouth within 2 hours of surgery. This includes gum, candy and breath mints.    If you drink alcohol: Stop drinking it the night before surgery.    If your care team tells you to take medicine on the morning of surgery, it's okay to take it with a sip of water.  Preventing infection    Shower or bathe the night before and morning of your surgery. Follow the instructions your clinic gave you. (If no instructions, use regular soap.)    Don't shave or clip hair near your surgery site. We'll remove the hair if needed.    Don't smoke or vape the morning of surgery. You may chew nicotine gum up to 2 hours before surgery. A nicotine patch is okay.  ? Note: Some surgeries require you to completely quit smoking and nicotine. Check with your surgeon.    Your care team will make every effort to keep you safe from infection. We will:  ? Clean our hands often with soap and water (or an alcohol-based hand rub).  ? Clean the skin at your surgery site with a special soap that kills germs.  ? Give you a special gown to keep you warm. (Cold raises the risk of infection.)  ? Wear special hair covers, masks, gowns and gloves during surgery.  ? Give antibiotic medicine, if prescribed. Not all surgeries need antibiotics.  What to bring on the day of surgery    Photo ID and insurance card    Copy of your health care directive, if you have one    Glasses and hearing aides (bring cases)  ? You can't wear contacts during surgery    Inhaler and eye drops, if you use them (tell us  about these when you arrive)    CPAP machine or breathing device, if you use them    A few personal items, if spending the night    If you have . . .  ? A pacemaker, ICD (cardiac defibrillator) or other implant: Bring the ID card.  ? An implanted stimulator: Bring the remote control.  ? A legal guardian: Bring a copy of the certified (court-stamped) guardianship papers.  Please remove any jewelry, including body piercings. Leave jewelry and other valuables at home.  If you're going home the day of surgery    You must have a responsible adult drive you home. They should stay with you overnight as well.    If you don't have someone to stay with you, and you aren't safe to go home alone, we may keep you overnight. Insurance often won't pay for this.  After surgery  If it's hard to control your pain or you need more pain medicine, please call your surgeon's office.  Questions?   If you have any questions for your care team, list them here: _________________________________________________________________________________________________________________________________________________________________________ ____________________________________ ____________________________________ ____________________________________  For informational purposes only. Not to replace the advice of your health care provider. Copyright   2003, 2019 [facility]. All rights reserved. Clinically reviewed by Suyapa Mitchell MD. [employer] [id number] - REV 07/22.

## 2022-10-25 NOTE — LETTER
November 2, 2022      Charlotte Blair  [address] [address]        Dear ,    We are writing to inform you of your test results.    Your urine tests are all normal.  You do not have gonorrhea or chlamydia.       Resulted Orders   UA macro with reflex to Microscopic and Culture - Clinc Collect   Result Value Ref Range    Color Urine Yellow Colorless, Straw, Light Yellow, Yellow    Appearance Urine Clear Clear    Glucose Urine Negative Negative, 1000 , >=2000 mg/dL    Bilirubin Urine Negative Negative    Ketones Urine Negative Negative, 160  mg/dL    Specific Gravity Urine 1.020 1.003 - 1.035    Blood Urine Negative Negative    pH Urine 6.5 5.0 - 7.0    Protein Albumin Urine Negative Negative, 300 , >=2000 mg/dL    Urobilinogen Urine 0.2 0.2, 1.0 E.U./dL    Nitrite Urine Negative Negative    Leukocyte Esterase Urine Negative Negative    Narrative    Microscopic not indicated   Wet prep - Clinic Collect   Result Value Ref Range    Trichomonas Absent Absent    Yeast Absent Absent    Clue Cells Present (A) Absent    WBCs/high power field 2+ (A) None   NEISSERIA GONORRHOEA PCR   Result Value Ref Range    Neisseria gonorrhoeae Negative Negative      Comment:      Negative for N. gonorrhoeae rRNA by transcription mediated amplification. A negative result by transcription mediated amplification does not preclude the presence of C. trachomatis infection because results are dependent on proper and adequate collection, absence of inhibitors and sufficient rRNA to be detected.   CHLAMYDIA TRACHOMATIS PCR   Result Value Ref Range    Chlamydia trachomatis Negative Negative      Comment:      A negative result by transcription mediated amplification does not preclude the presence of C. trachomatis infection because results are dependent on proper and adequate collection, absence of inhibitors and sufficient rRNA to be detected.       If you have any questions or concerns, please call the clinic at the  number listed above.       Sincerely,      Shama Sanders MD

## 2022-10-25 NOTE — PROGRESS NOTES
[address] [address]  Phone: [phone]  Fax: [phone]  Primary Provider: Chito Coppola  Pre-op Performing Provider: JOSE ALFREDO VALADEZ      PREOPERATIVE EVALUATION:  Today's date: 10/25/2022    Charlotte Blair is a 22 year old female who presents for a preoperative evaluation.    Surgical Information:  Surgery/Procedure: Heart Review  Surgery Location: [facility]   Surgeon:   Surgery Date: 10/27/22    Where patient plans to recover: At home with family  Fax number for surgical facility: Note does not need to be faxed, will be available electronically in Epic.    Type of Anesthesia Anticipated: to be determined    Assessment & Plan     The proposed surgical procedure is considered LOW risk.    Problem List Items Addressed This Visit        Circulatory    Mitral valve prolapse   Other Visit Diagnoses     Dysuria    -  Primary    Relevant Orders    UA macro with reflex to Microscopic and Culture - Clinc Collect (Completed)    Vaginitis and vulvovaginitis        Relevant Orders    Wet prep - Clinic Collect (Completed)    Screen for STD (sexually transmitted disease)        Relevant Orders    NEISSERIA GONORRHOEA PCR (Completed)    CHLAMYDIA TRACHOMATIS PCR (Completed)    Preop general physical exam        Bacterial vaginitis        Relevant Medications    metroNIDAZOLE (METROGEL) 0.75 % vaginal gel        BV present but unrelated to procedure and is started on metrogel today    RECOMMENDATION:  APPROVAL GIVEN to proceed with proposed procedure, without further diagnostic evaluation.                Subjective     HPI related to upcoming procedure: MVP worsening needs EMETERIO    Preop Questions 10/25/2022   1. Have you ever had a heart attack or stroke? No   2. Have you ever had surgery on your heart or blood vessels, such as a stent placement, a coronary artery bypass, or surgery on an artery in your head, neck, heart, or legs? No   3. Do you  have chest pain with activity? No   4. Do you have a history of  heart failure? UNKNOWN -    5. Do you currently have a cold, bronchitis or symptoms of other infection? No   6. Do you have a cough, shortness of breath, or wheezing? No   7. Do you or anyone in your family have previous history of blood clots? No   8. Do you or does anyone in your family have a serious bleeding problem such as prolonged bleeding following surgeries or cuts? No   9. Have you ever had problems with anemia or been told to take iron pills? No   10. Have you had any abnormal blood loss such as black, tarry or bloody stools, or abnormal vaginal bleeding? No   11. Have you ever had a blood transfusion? No   12. Are you willing to have a blood transfusion if it is medically needed before, during, or after your surgery? Yes   13. Have you or any of your relatives ever had problems with anesthesia? No   14. Do you have sleep apnea, excessive snoring or daytime drowsiness? No   15. Do you have any artifical heart valves or other implanted medical devices like a pacemaker, defibrillator, or continuous glucose monitor? No   16. Do you have artificial joints? No   17. Are you allergic to latex? No   18. Is there any chance that you may be pregnant? No       Health Care Directive:  Patient does not have a Health Care Directive or Living Will: Discussed advance care planning with patient; information given to patient to review.    Preoperative Review of :   reviewed - no record of controlled substances prescribed.          Review of Systems  Neg except as per HPI    Patient Active Problem List    Diagnosis Date Noted     Mitral valve prolapse 10/20/2022     Priority: Medium     Interpretation Summary -ECHO done 9/8/22      Left ventricular size is mildly dilated. Left ventricular systolic function is   normal. Left ventricular ejection fraction estimated 60 to 65% without wall   motion abnormality.   Normal right ventricular size and systolic  function.   Moderate left atrial enlargement.   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 2 jets are   identified 1 directed anteriorly and 1 directed posteriorly with estimated   degree of mitral regurgitation to be moderately severe.   Trace to mild tricuspid insufficiency. Estimate of RV systolic pressure 29   mmHg plus right atrial pressure.   Consider transesophageal echocardiogram to better define the degree of mitral   regurgitation.        Major depression in complete remission (H) 01/14/2022     Priority: Medium     Counseling for birth control, oral contraceptives 04/13/2021     Priority: Medium     Acquired hypothyroidism 11/14/2018     Priority: Medium     Referred to endocrinology 11/14/2018         Class 1 obesity due to excess calories with serious comorbidity and body mass index (BMI) of 31.0 to 31.9 in adult 11/14/2018     Priority: Medium     Anxiety 12/05/2017     Priority: Medium     Was previously taking Zoloft, now not on any medication      Formatting of this note might be different from the original.  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  followto  Heart care clinic       Aortic stenosis 05/10/2016     Priority: Medium     Horn Syndrome      Priority: Medium     Created by Conversion          Past Medical History:   Diagnosis Date     Anxiety     previous on zoloft     Disease of thyroid gland     hypothyroxine     Gonadal dysgenesis     Created by Conversion      Urinary tract infection     non recurrent     Past Surgical History:   Procedure Laterality Date     WISDOM TOOTH EXTRACTION    "    Current Outpatient Medications   Medication Sig Dispense Refill     metroNIDAZOLE (METROGEL) 0.75 % vaginal gel Place 1 applicator (5 g) vaginally daily for 5 days 70 g 0     norgestim-eth estrad triphasic (ORTHO TRI-CYCLEN) 0.18/0.215/0.25 MG-35 MCG tablet Take 1 tablet by mouth daily 84 tablet 2       Allergies   Allergen Reactions     Amoxicillin Hives     Penicillins Hives        Social History     Tobacco Use     Smoking status: Every Day     Types: Cigarettes     Smokeless tobacco: Never   Substance Use Topics     Alcohol use: No     Fam Hx neg for anesthesia intolerance  History   Drug Use No         Objective     /81 (BP Location: Right arm, Patient Position: Sitting, Cuff Size: Adult Regular)   Pulse 76   Ht 1.575 m (5' 2\")   Wt 79.9 kg (176 lb 3 oz)   LMP 10/04/2022   SpO2 98%   BMI 32.23 kg/m      Physical Exam      General: alert and oriented ×3 no acute distress.    HEENT: Normocephalic and atraumatic.   Eyes pupils are equal round and reactive to light extraocular motion is intact. normal conjunctiva    Hearing is grossly normal and there is no otorrhea. Tympanic membranes are pearly grey with a normal light reflex.    Chest: has bilateral rise with no increased work of breathing. clear to auscultation without wheezes, rhonchi, or rales.    Cardiovascular: normal perfusion and brisk capillary refill. S1S2 with regular rate and rhythm and no murmurs, gallops or rubs.    Musculoskeletal: no gross focal abnormalities and normal gait.    Neuro: no gross focal abnormalities and memory seems intact. CN 2-12 are grossly intact.    Psychiatric: speech is clear and coherent and fluent. Patient dressed appropriately for the weather. Mood is appropriate and affect is full.          Recent Labs   Lab Test 01/14/22  1518      POTASSIUM 4.5   CR 0.61        Diagnostics:  No results found for this or any previous visit (from the past 24 hour(s)).   No EKG required for low risk surgery (cataract, " skin procedure, breast biopsy, etc).      Signed Electronically by: Shama Sanders MD  Copy of this evaluation report is provided to requesting physician.

## 2022-10-26 LAB
C TRACH DNA SPEC QL NAA+PROBE: NEGATIVE
N GONORRHOEA DNA SPEC QL NAA+PROBE: NEGATIVE

## 2022-10-27 ENCOUNTER — HOSPITAL ENCOUNTER (OUTPATIENT)
Dept: CARDIOLOGY | Facility: HOSPITAL | Age: 22
Discharge: HOME | End: 2022-10-27
Attending: INTERNAL MEDICINE | Admitting: INTERNAL MEDICINE
Payer: COMMERCIAL

## 2022-10-27 ENCOUNTER — TELEPHONE (OUTPATIENT)
Dept: CARDIOLOGY | Facility: CLINIC | Age: 22
End: 2022-10-27

## 2022-10-27 VITALS
WEIGHT: 176 LBS | SYSTOLIC BLOOD PRESSURE: 132 MMHG | OXYGEN SATURATION: 98 % | HEART RATE: 82 BPM | RESPIRATION RATE: 25 BRPM | TEMPERATURE: 98 F | HEIGHT: 61 IN | DIASTOLIC BLOOD PRESSURE: 70 MMHG | BODY MASS INDEX: 33.23 KG/M2

## 2022-10-27 DIAGNOSIS — R00.0 TACHYCARDIA: ICD-10-CM

## 2022-10-27 DIAGNOSIS — R00.2 PALPITATIONS: ICD-10-CM

## 2022-10-27 DIAGNOSIS — I34.1 MITRAL VALVE PROLAPSE: Primary | ICD-10-CM

## 2022-10-27 DIAGNOSIS — I34.1 MITRAL VALVE PROLAPSE: ICD-10-CM

## 2022-10-27 LAB — LVEF ECHO: NORMAL

## 2022-10-27 PROCEDURE — 99152 MOD SED SAME PHYS/QHP 5/>YRS: CPT | Performed by: INTERNAL MEDICINE

## 2022-10-27 PROCEDURE — 93312 ECHO TRANSESOPHAGEAL: CPT | Mod: 26 | Performed by: INTERNAL MEDICINE

## 2022-10-27 PROCEDURE — 93325 DOPPLER ECHO COLOR FLOW MAPG: CPT

## 2022-10-27 PROCEDURE — 999N000127 HC STATISTIC PERIPHERAL IV START W US GUIDANCE

## 2022-10-27 PROCEDURE — 250N000011 HC RX IP 250 OP 636: Performed by: INTERNAL MEDICINE

## 2022-10-27 PROCEDURE — 250N000009 HC RX 250: Performed by: INTERNAL MEDICINE

## 2022-10-27 PROCEDURE — 93320 DOPPLER ECHO COMPLETE: CPT | Mod: 26 | Performed by: INTERNAL MEDICINE

## 2022-10-27 PROCEDURE — 258N000003 HC RX IP 258 OP 636: Performed by: INTERNAL MEDICINE

## 2022-10-27 PROCEDURE — 93325 DOPPLER ECHO COLOR FLOW MAPG: CPT | Mod: 26 | Performed by: INTERNAL MEDICINE

## 2022-10-27 RX ORDER — SODIUM CHLORIDE 9 MG/ML
INJECTION, SOLUTION INTRAVENOUS CONTINUOUS
Status: DISCONTINUED | OUTPATIENT
Start: 2022-10-27 | End: 2022-10-27 | Stop reason: HOSPADM

## 2022-10-27 RX ORDER — FENTANYL CITRATE 50 UG/ML
INJECTION, SOLUTION INTRAMUSCULAR; INTRAVENOUS
Status: COMPLETED | OUTPATIENT
Start: 2022-10-27 | End: 2022-10-27

## 2022-10-27 RX ADMIN — FENTANYL CITRATE 100 MCG: 50 INJECTION, SOLUTION INTRAMUSCULAR; INTRAVENOUS at 08:33

## 2022-10-27 RX ADMIN — FENTANYL CITRATE 50 MCG: 50 INJECTION, SOLUTION INTRAMUSCULAR; INTRAVENOUS at 08:47

## 2022-10-27 RX ADMIN — BENZOCAINE 6 SPRAY: 220 SPRAY, METERED PERIODONTAL at 08:34

## 2022-10-27 RX ADMIN — MIDAZOLAM 1 MG: 1 INJECTION INTRAMUSCULAR; INTRAVENOUS at 08:46

## 2022-10-27 RX ADMIN — MIDAZOLAM 2 MG: 1 INJECTION INTRAMUSCULAR; INTRAVENOUS at 08:34

## 2022-10-27 RX ADMIN — SODIUM CHLORIDE: 9 INJECTION, SOLUTION INTRAVENOUS at 08:02

## 2022-10-27 ASSESSMENT — ACTIVITIES OF DAILY LIVING (ADL): ADLS_ACUITY_SCORE: 35

## 2022-10-27 NOTE — DISCHARGE INSTRUCTIONS
1. You are required to have someone accompany you home.    2. Rest today. Do not drive or operate machinery today. Over-activity may produce nausea and dizziness.    3. You should follow your normal diet. Drink plenty of fluids. Do not drink any alcoholic beverages for 24 hours. *(Alcohol may interact with the medications you received today).    4. NO HOT FOODS or LIQUIDS FOR 6 HOURS after the procedure. 3:00 PM today    5. You may have a sore throat or cough. This is normal. These symptoms should resolve in 24 hours.     6. If you have further questions call your doctor:   * Recovery After Conscious Sedation (Adult)  We gave you medicine by vein to make you sleepy or relaxed during your procedure. This may have included both a pain medicine and sleeping medicine. Most of the effects have worn off. But you may still feel sleepy for the next 6 to 8 hours.  Home care  Follow these guidelines when you get home:  You may feel sleepy and clumsy and have poor balance for the next few hours.  A responsible adult should stay with you for the next 8 hours. This person should make sure your condition doesn t get worse.  Don't drink any alcohol for the next 24 hours.  Don't drive, operate dangerous machinery, make important business or personal decisions or sign legal documents during the next 24 hours.  You may vomit (throw up) if you eat too soon after the procedure. If this happens, drink small amounts of water, juice or clear broth. Wait to try solid food until you no longer have nausea (upset stomach).  Note: Your care team may tell you not to take any medicine by mouth for pain or sleep in the next 4 hours. These medicines may react with the medicines you had in the hospital. This could cause a much stronger response than usual.  Follow-up care  Follow up with your care team if you are not alert and back to your usual level of activity within 12 hours.  When to seek medical advice  Call your care team right away if any  of these occur:  You still feel sleepy or clumsy after 12 hours, or your sleepiness gets worse  Weakness or dizziness gets worse  Repeated vomiting  If you can't be woken up and someone is staying with you, they should call 911.  Date Last Reviewed: 10/18/2016    6762-4034 [employer]. [address] [place] [address], [place] [address]. All rights reserved. This information is not intended as a substitute for professional medical care. Always follow your healthcare professional's instructions.  This information has been modified by your health care provider with permission from the publisher.

## 2022-10-27 NOTE — TELEPHONE ENCOUNTER
----- Message from Hussain Garcia MD sent at 10/27/2022  9:07 AM CDT -----  Please schedule routine f/unit(s) visit to discuss EMETERIO implications. [facility]

## 2022-10-28 NOTE — RESULT ENCOUNTER NOTE
Team - please call patient to schedule follow-up visit to discuss further. [facility]

## 2023-05-17 ENCOUNTER — OFFICE VISIT (OUTPATIENT)
Dept: CARDIOLOGY | Facility: CLINIC | Age: 23
End: 2023-05-17
Attending: INTERNAL MEDICINE
Payer: COMMERCIAL

## 2023-05-17 VITALS
RESPIRATION RATE: 16 BRPM | WEIGHT: 170 LBS | DIASTOLIC BLOOD PRESSURE: 82 MMHG | HEIGHT: 62 IN | SYSTOLIC BLOOD PRESSURE: 142 MMHG | HEART RATE: 104 BPM | BODY MASS INDEX: 31.28 KG/M2

## 2023-05-17 DIAGNOSIS — R00.0 TACHYCARDIA: ICD-10-CM

## 2023-05-17 DIAGNOSIS — Q96.9 TURNER SYNDROME: ICD-10-CM

## 2023-05-17 DIAGNOSIS — I34.0 SEVERE MITRAL INSUFFICIENCY: Primary | ICD-10-CM

## 2023-05-17 DIAGNOSIS — R00.2 PALPITATIONS: ICD-10-CM

## 2023-05-17 DIAGNOSIS — I34.1 MITRAL VALVE PROLAPSE: ICD-10-CM

## 2023-05-17 PROBLEM — I08.0 MITRAL REGURGITATION AND AORTIC STENOSIS: Status: RESOLVED | Noted: 2017-11-03 | Resolved: 2023-05-17

## 2023-05-17 PROCEDURE — 99215 OFFICE O/P EST HI 40 MIN: CPT | Performed by: INTERNAL MEDICINE

## 2023-05-17 NOTE — PATIENT INSTRUCTIONS
It was nice to speak with you today.  It is unfortunate that it has taken so long to meet up after your test results.  We will schedule an echocardiogram to reassess your heart muscle function and valve status.  We will schedule you to meet with Dr. Magdaleno for consultation to discuss options for surgical treatment of your mitral valve.  Stay active, targeting a brisk walk for 20 minutes each day.  Check in with your gynecologist to discuss fertility issues that may affect our choice of treatment for your valve.

## 2023-05-17 NOTE — PROGRESS NOTES
Cardiology Clinic Office Note    Assessment / Plan:    1.  23-year-old woman with bileaflet mitral valve prolapse, severe mitral insufficiency, and Horn syndrome.  Progressive exertional dyspnea may wendy a decrease in ventricular function, we discussed that we will likely need to proceed sooner rather than later for valve repair or replacement, likely within the next year.  She understands that and is interested in further investigation at this time.  She will speak with her GYN for fertility evaluation, we will reschedule an echocardiogram, and planned surgical consultation to discuss treatment options.  2.  Palpitations.  No arrhythmias associated with her palpitations on monitoring.  3.  Sedentary lifestyle.  Advised moderate walking on a daily basis as an assessment of activity tolerance.    Follow-up dependent upon echo results    ______________________________________________________________________    Subjective:    I had the opportunity to see Charlotte Blair at the [facility]. Charlotte Blair is a 23 year old female with a history of Horn syndrome, bileaflet mitral valve prolapse with severe mitral insufficiency who I met last fall.  Transesophageal echo disclosed bileaflet prolapse with severe mitral insufficiency and preserved left ventricular systolic function.  Follow-up to discuss treatment options was requested, we are meeting today for the first time after her test results in October.    She has noticed that over the last year, her activity tolerance is decreased.  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  She notes that climbing a flight of steps is not as easy as it was a year ago.  She does not engage in any regular exercise regimen.  Her weight is down a few pounds from when I saw her last year.  Her sleep remains restorative.  She has had no orthopnea or paroxysmal nocturnal dyspnea or syncope or  chest pains.  She does still note palpitations, which were not associated with arrhythmias on a monitor last fall.  1 x5 beat run of supraventricular tachycardia was noted but otherwise no atrial fibrillation was present.  Severe left atrial enlargement was demonstrated on the transesophageal echo.    She reports that her entire life she has had discussions regarding the eventual need for heart valve surgery.  We did discuss potential options for treatment including mechanical valve prosthesis, bioprosthesis, and valve repair, all  open chest procedures.  She has not sought an evaluation of her fertility with Horn's syndrome to determine whether that may influence her choice of valve treatment.  She was encouraged to do so.  We discussed the need to definitively treat the valve before deterioration in ventricular function has occurred, she seems to understand this concept as we have discussed it previously.    ______________________________________________________________________    Problem List:  Patient Active Problem List   Diagnosis     Horn Syndrome     Anxiety     Acquired hypothyroidism     Class 1 obesity due to excess calories with serious comorbidity and body mass index (BMI) of 31.0 to 31.9 in adult     Counseling for birth control, oral contraceptives     Severe mitral insufficiency     Major depression in complete remission (H)     Mitral valve prolapse     Medical History:  Past Medical History:   Diagnosis Date     Anxiety     previous on zoloft     Disease of thyroid gland     hypothyroxine     Gonadal dysgenesis     Created by Conversion      Urinary tract infection     non recurrent     Surgical History:  Past Surgical History:   Procedure Laterality Date     WISDOM TOOTH EXTRACTION       Social History:  Social History     Socioeconomic History     Marital status: Single     Spouse name: Not on file     Number of children: Not on file     Years of education: Not on file     Highest education  level: Not on file   Occupational History     Not on file   Tobacco Use     Smoking status: Every Day     Types: Cigarettes     Smokeless tobacco: Never   Vaping Use     Vaping status: Every Day   Substance and Sexual Activity     Alcohol use: No     Drug use: No     Sexual activity: Yes     Partners: Male     Birth control/protection: OCP, Condom   Other Topics Concern     Not on file   Social History Narrative    Lives in apartment with roommate.   Works .  Single.       Social Determinants of Health     Financial Resource Strain: Not on file   Food Insecurity: Not on file   Transportation Needs: Not on file   Physical Activity: Not on file   Stress: Not on file   Social Connections: Not on file   Intimate Partner Violence: Not on file   Housing Stability: Not on file     Sleep History:  Restorative supine.  No paroxysmal nocturnal dyspnea.  Exercise History:  Tires walking up a long flight of steps.  Decreased activity tolerance versus a year ago    Review of Systems:      Positive for shortness of breath with activity, palpitations, anxiety.  12 point review of systems otherwise negative     Please refer above for cardiac ROS details.         Family History:  Family History   Problem Relation Age of Onset     Coronary Artery Disease Father      Hypertension Mother      Ovarian cysts Sister      Hyperlipidemia Brother      No Known Problems Maternal Grandmother      Coronary Artery Disease Maternal Grandfather      Parkinsonism Paternal Grandmother      Suicidality Paternal Grandfather          Allergies:  Allergies   Allergen Reactions     Amoxicillin Hives     Penicillins Hives     Medications:  Current Outpatient Medications   Medication Sig Dispense Refill     norgestim-eth estrad triphasic (ORTHO TRI-CYCLEN) 0.18/0.215/0.25 MG-35 MCG tablet Take 1 tablet by mouth daily 84 tablet 2       Objective:   Wt Readings from Last 3 Encounters:   05/17/23 77.1 kg (170 lb)   10/27/22 79.8 kg (176 lb)  "  10/25/22 79.9 kg (176 lb 3 oz)     Vital signs:  BP (!) 142/82 (BP Location: Left arm, Patient Position: Sitting, Cuff Size: Adult Regular)   Pulse 104   Resp 16   Ht 1.575 m (5' 2\")   Wt 77.1 kg (170 lb)   BMI 31.09 kg/m        Physical Exam:    General Appearance : Awake, Alert, No acute distress  HEENT: No Scleral icterus; the mucous membranes were pink and moist.  Conjunctivae not injected  Neck:  No cervical bruits, jugular venous distention, or thyromegaly.  Webbed.  Chest: The spine was straight. Chest wall symmetric  Lungs: Respirations unlabored; the lungs are clear to auscultation.  No wheezing   Cardiovascular:   Normal point of maximal impulse.  Auscultation reveals regular first and second heart sounds with 3/6 to 4/6 holosystolic murmur left lower sternal border radiating through to the back.  Possible S3 gallop..  Carotid, radial, and dorsalis pedal pulses are intact and symmetric.    Abdomen: Rounded.  No organomegaly, masses, bruits, or tenderness. Bowels sounds are present  Extremities:  No clubbing, cyanosis.  Thick, no edema  Skin: No rash, bruising  Musculoskeletal: No tenderness.  Neurologic: Alert and oriented ×3. Speech is fluent.    Lab Results:  LIPIDS:  Lab Results   Component Value Date    CHOL 189 01/14/2022    CHOL 185 04/13/2021    CHOL 208 (H) 02/17/2020     Lab Results   Component Value Date    HDL 64 01/14/2022    HDL 59 04/13/2021    HDL 70 02/17/2020     Lab Results   Component Value Date     01/14/2022     04/13/2021     02/17/2020     Lab Results   Component Value Date    TRIG 99 01/14/2022    TRIG 57 04/13/2021    TRIG 71 02/17/2020       Transesophageal echo 10/2022:  Left ventricular size, wall motion and function are normal. The ejection  fraction is 60-65%.  Normal right ventricle size and systolic function.  Systolic flow reversal visualized in left superior pulmonary vein only.  There is severe (4+) mitral regurgitation.  The mitral valve " leaflets appear myxomatous.  Severe mitral valve prolapse, anterior leaflet  Severe mitral valve prolapse, posterior leaflet  The left atrium is moderate to severely dilated.    Holter monitoring from 9/8/2022 to 9/9/2022 (duration 24hrs). Predominant underlying rhythm was sinus rhythm, 58 to 125bpm, average 93bpm. 1 episode of nonsustained supraventricular tachycardia - 5 beats, 176bpm. No sustained tachyarrhythmias. No atrial fibrillation. There were no pauses of greater than 3 seconds. Rare supraventricular ectopic beats (<1%). Rare premature ventricular contractions (<1%). Symptom triggers for palpitations (2) correlated to sinus rhythm 89-103bpm        JUAN PABLO YUNG MD [facility]  [phone]    This note created using Dragon voice recognition software.  Sound alike errors may have escaped editing.  67 minutes today spent in discussing treatment options, need for further evaluation, chart review, image review.

## 2023-06-28 ENCOUNTER — HOSPITAL ENCOUNTER (OUTPATIENT)
Dept: CARDIOLOGY | Facility: HOSPITAL | Age: 23
Discharge: HOME | End: 2023-06-28
Attending: INTERNAL MEDICINE | Admitting: INTERNAL MEDICINE
Payer: COMMERCIAL

## 2023-06-28 DIAGNOSIS — R00.0 TACHYCARDIA: ICD-10-CM

## 2023-06-28 DIAGNOSIS — I34.0 SEVERE MITRAL INSUFFICIENCY: ICD-10-CM

## 2023-06-28 DIAGNOSIS — R00.2 PALPITATIONS: ICD-10-CM

## 2023-06-28 DIAGNOSIS — Q96.9 TURNER SYNDROME: ICD-10-CM

## 2023-06-28 DIAGNOSIS — I34.1 MITRAL VALVE PROLAPSE: ICD-10-CM

## 2023-06-28 PROCEDURE — 93306 TTE W/DOPPLER COMPLETE: CPT | Mod: 26 | Performed by: INTERNAL MEDICINE

## 2023-06-28 PROCEDURE — 255N000002 HC RX 255 OP 636: Performed by: INTERNAL MEDICINE

## 2023-06-28 RX ADMIN — PERFLUTREN 2 ML: 6.52 INJECTION, SUSPENSION INTRAVENOUS at 09:47

## 2023-06-29 DIAGNOSIS — Q96.9 TURNER SYNDROME: ICD-10-CM

## 2023-06-29 DIAGNOSIS — I34.0 SEVERE MITRAL INSUFFICIENCY: Primary | ICD-10-CM

## 2023-06-29 DIAGNOSIS — I34.1 MITRAL VALVE PROLAPSE: ICD-10-CM

## 2023-07-23 ENCOUNTER — HEALTH MAINTENANCE LETTER (OUTPATIENT)
Age: 23
End: 2023-07-23

## 2023-07-25 ENCOUNTER — OFFICE VISIT (OUTPATIENT)
Dept: CARDIOLOGY | Facility: CLINIC | Age: 23
End: 2023-07-25
Payer: COMMERCIAL

## 2023-07-25 VITALS
SYSTOLIC BLOOD PRESSURE: 139 MMHG | HEART RATE: 90 BPM | BODY MASS INDEX: 31.64 KG/M2 | WEIGHT: 173 LBS | DIASTOLIC BLOOD PRESSURE: 76 MMHG | RESPIRATION RATE: 16 BRPM | OXYGEN SATURATION: 98 %

## 2023-07-25 DIAGNOSIS — Q96.9 TURNER SYNDROME: ICD-10-CM

## 2023-07-25 DIAGNOSIS — I34.1 MITRAL VALVE PROLAPSE: ICD-10-CM

## 2023-07-25 DIAGNOSIS — I34.0 SEVERE MITRAL INSUFFICIENCY: ICD-10-CM

## 2023-07-25 PROCEDURE — 99205 OFFICE O/P NEW HI 60 MIN: CPT | Performed by: SURGERY

## 2023-07-25 NOTE — LETTER
Cardiovascular Surgery  [facility]                                                                  This patient is scheduled to under go a heart valve repair or replacement.    Please have the dentist sign the attached form and fax or e-mail it to the Cardiovascular Surgery office prior to their surgery date.    Primary fax number:  [facility] [phone]    E-mail:   [place] RN Care Coordinator: [email or web address]  (Babs)      Please contact Babs Bajwa RN at [phone].    Thank you,  Cardiovascular Surgery Office                              PATIENT:   HANNA MORRIS    :   2000       DATE OF DENTAL VISIT: __________________                                                    ___________________________   was seen by me today for dental clearance prior to heart valve surgery.     ______  No additional treatment is needed prior to surgery.    ______  Further treatment is needed and will be completed prior to surgery date.    ______  Surgery date may need to be rescheduled due to this condition     _________________________________________________________.    Dr. _______________________    Phone _______________________.      Clinic name ____________________________________________________      Address_______________________________________________________

## 2023-07-25 NOTE — LETTER
7/25/2023    Chito Coppola MD  [address]  [place] [address]    RE: Charlotte Blair       Dear Colleague,     I had the pleasure of seeing Charlotte Blair in the [facility].  Cardiac and Thoracic Surgery Consultation      Charlotte Blair MRN# [id number]   YOB: 2000 Age: 23 year old        Reason for consult: I was asked by Dr. Garcia to evaluate this patient for severe mitral regurgitation.           Assessment and Plan:   Ms. Blair is a 23-year-old woman with myxomatous degeneration of the mitral valve, severe mitral regurgitation and heart failure symptoms. She also has Horn syndrome. I recommend mitral valve repair, and if this is not possible, then mitral replacement. I will plan to excise the left atrial appendage with the expectation that the valve will be repairable. I described the technical details, as well as the expected postoperative course and recovery to the patient. I also discussed the risks and benefits of the procedure. The risks include but are not limited to bleeding, infection, stroke, heart failure, dysrhythmia, respiratory failure, kidney or liver injury, bowel or limb ischemia, and death. The calculated STS risk of mortality is 1%, and the calculated STS risk of major morbidity or mortality is 15%. The patient understands these risks and wishes to undergo the operation.     I discussed the option of a bioprosthetic versus a mechanical valve with the patients. The patient understands that a bioprosthetic valve would not require lifelong anticoagulation, but there is a risk of prosthetic valve degeneration. I also explained that while a mechanical valve has unlimited durability, this would require lifelong anticoagulation with Coumadin. There is also a small risk of hearing the valve click. The risk of infection is equal between the two. If replacement is necessary, she would like to have a mechanical valve.    Surgery will be scheduled in  the coming months.    After dental evaluation, the preoperative evaluation will be complete.              Chief Complaint:   Ms. Blair is a 23-year-old woman with myxomatous degeneration of the mitral valve, severe mitral regurgitation and heart failure symptoms.     History is obtained from the patient         History of Present Illness:   This patient is a 23 year old female who presents to discuss treatment of her severe mitral regurgitation due to myxomatous mitral degeneration with bileaflet prolapse. She has noted worsening exertional dyspnea in the last year. She also endorses fatigue and lower extremity edema. She denies palpitations, and she has not had orthopnea or paroxysmal nocturnal dyspnea.    She also has Horn syndrome.      She does not plan on getting pregnant and because of potential fertility complications of Horn syndrome, this may not be possible.               Past Medical History:     Past Medical History:   Diagnosis Date    Anxiety     previous on zoloft    Disease of thyroid gland     hypothyroxine    Gonadal dysgenesis     Created by Conversion     Urinary tract infection     non recurrent             Past Surgical History:     Past Surgical History:   Procedure Laterality Date    WISDOM TOOTH EXTRACTION                 Social History:     Social History     Tobacco Use    Smoking status: Every Day     Types: Cigarettes    Smokeless tobacco: Never   Substance Use Topics    Alcohol use: No             Family History:     Family History   Problem Relation Age of Onset    Coronary Artery Disease Father     Hypertension Mother     Ovarian cysts Sister     Hyperlipidemia Brother     No Known Problems Maternal Grandmother     Coronary Artery Disease Maternal Grandfather     Parkinsonism Paternal Grandmother     Suicidality Paternal Grandfather              Immunizations:     Immunization History   Administered Date(s) Administered    DTaP, Unspecified 10/02/2003    Flu, Unspecified  09/01/2018    HPV Quadrivalent 07/20/2007, 03/30/2009    Hepatitis B (Peds <19Y) 11/20/2003    Influenza Vaccine, 6+MO IM (QUADRIVALENT W/PRESERVATIVES) 11/03/2017    MMR 10/02/2003    Poliovirus, inactivated (IPV) 10/02/2003    TDAP (Adacel,Boostrix) 11/23/2014             Allergies:     Allergies   Allergen Reactions    Amoxicillin Hives    Penicillins Hives             Medications:     Current Outpatient Medications Ordered in Epic   Medication    norgestim-eth estrad triphasic (ORTHO TRI-CYCLEN) 0.18/0.215/0.25 MG-35 MCG tablet     No current Epic-ordered facility-administered medications on file.             Review of Systems:     The 10 point Review of Systems is negative other than noted in the HPI            Physical Exam:   Vitals were reviewed      BP: 139/76 Pulse: 90   Resp: 16 SpO2: 98 %      Constitutional:   awake, alert, cooperative, no apparent distress, and appears stated age     Eyes:   Lids and lashes normal, pupils equal, round and reactive to light, extra ocular muscles intact, sclera clear, conjunctiva normal     ENT:   normocepalic, without obvious abnormality     Neck:   supple, symmetrical, trachea midline     Lungs:   no increased work of breathing, good air exchange, and no retractions     Cardiovascular:   regular rate and rhythm     Abdomen:   non-distended     Musculoskeletal:   no lower extremity pitting edema present  there is no redness, warmth, or swelling of the joints  full range of motion noted  motor strength is 5 out of 5 all extremities bilaterally     Neurologic:   Mental Status Exam:  Level of Alertness:   awake  Orientation:   person, place, time  Cranial Nerves:  cranial nerves II-XII are grossly intact  Motor Exam:  moves all extremities well and symmetrically     Neuropsychiatric:   General: normal, calm, and normal eye contact  Level of consciousness: alert / normal  Affect: normal     Skin:   no bruising or bleeding, normal skin color, texture, turgor, and no redness,  warmth, or swelling          Data:   All laboratory data reviewed  All cardiac studies reviewed by me.  All imaging studies reviewed by me.    TRANSTHORACIC ECHOCARDIOGRAPHY:  1. The left ventricle is mildly enlarged. Normal left ventricular systolic  performance with a visually estimated ejection fraction of 65-70%.  2. There is prolapse of both the anterior & posterior mitral valve leaflets  with moderate-severe mitral insufficiency.  3. Normal right ventricular size and systolic performance.  4. There is moderate to severe left atrial enlargement.    TRANSESOPHAGEAL ECHOCARDIOGRAPHY:  Left ventricular size, wall motion and function are normal. The ejection  fraction is 60-65%.  Normal right ventricle size and systolic function.  Systolic flow reversal visualized in left superior pulmonary vein only.  There is severe (4+) mitral regurgitation.  The mitral valve leaflets appear myxomatous.  Severe mitral valve prolapse, anterior leaflet  Severe mitral valve prolapse, posterior leaflet      Thank you for allowing me to participate in the care of your patient.      Sincerely,     Jeff Magdaleno MD     [facility]  cc:   Hussain Garcia MD  [address]  [place] [address]

## 2023-07-25 NOTE — PATIENT INSTRUCTIONS
You were seen today in the [facility]    Schedule with your dentist and let Babs know when/where you're scheduled. At that time we will get you a date for surgery.    Please call VARINDER Brice surgery coordinator with any questions.  Thank you.    Babs Bajwa RNCC  Cardiovascular Surgery  Phone [phone]  Fax [phone]

## 2023-07-25 NOTE — PROGRESS NOTES
Cardiac and Thoracic Surgery Consultation      Charlotte Blair MRN# [id number]   YOB: 2000 Age: 23 year old        Reason for consult: I was asked by Dr. Garcia to evaluate this patient for severe mitral regurgitation.           Assessment and Plan:   Ms. Blair is a 23-year-old woman with myxomatous degeneration of the mitral valve, severe mitral regurgitation and heart failure symptoms. She also has Horn syndrome. I recommend mitral valve repair, and if this is not possible, then mitral replacement. I will plan to excise the left atrial appendage with the expectation that the valve will be repairable. I described the technical details, as well as the expected postoperative course and recovery to the patient. I also discussed the risks and benefits of the procedure. The risks include but are not limited to bleeding, infection, stroke, heart failure, dysrhythmia, respiratory failure, kidney or liver injury, bowel or limb ischemia, and death. The calculated STS risk of mortality is 1%, and the calculated STS risk of major morbidity or mortality is 15%. The patient understands these risks and wishes to undergo the operation.     I discussed the option of a bioprosthetic versus a mechanical valve with the patients. The patient understands that a bioprosthetic valve would not require lifelong anticoagulation, but there is a risk of prosthetic valve degeneration. I also explained that while a mechanical valve has unlimited durability, this would require lifelong anticoagulation with Coumadin. There is also a small risk of hearing the valve click. The risk of infection is equal between the two. If replacement is necessary, she would like to have a mechanical valve.    Surgery will be scheduled in the coming months.    After dental evaluation, the preoperative evaluation will be complete.              Chief Complaint:   Ms. Blair is a 23-year-old woman with myxomatous degeneration of the mitral valve,  severe mitral regurgitation and heart failure symptoms.     History is obtained from the patient         History of Present Illness:   This patient is a 23 year old female who presents to discuss treatment of her severe mitral regurgitation due to myxomatous mitral degeneration with bileaflet prolapse. She has noted worsening exertional dyspnea in the last year. She also endorses fatigue and lower extremity edema. She denies palpitations, and she has not had orthopnea or paroxysmal nocturnal dyspnea.    She also has Horn syndrome.      She does not plan on getting pregnant and because of potential fertility complications of Horn syndrome, this may not be possible.               Past Medical History:     Past Medical History:   Diagnosis Date    Anxiety     previous on zoloft    Disease of thyroid gland     hypothyroxine    Gonadal dysgenesis     Created by Conversion     Urinary tract infection     non recurrent             Past Surgical History:     Past Surgical History:   Procedure Laterality Date    WISDOM TOOTH EXTRACTION                 Social History:     Social History     Tobacco Use    Smoking status: Every Day     Types: Cigarettes    Smokeless tobacco: Never   Substance Use Topics    Alcohol use: No             Family History:     Family History   Problem Relation Age of Onset    Coronary Artery Disease Father     Hypertension Mother     Ovarian cysts Sister     Hyperlipidemia Brother     No Known Problems Maternal Grandmother     Coronary Artery Disease Maternal Grandfather     Parkinsonism Paternal Grandmother     Suicidality Paternal Grandfather              Immunizations:     Immunization History   Administered Date(s) Administered    DTaP, Unspecified 10/02/2003    Flu, Unspecified 09/01/2018    HPV Quadrivalent 07/20/2007, 03/30/2009    Hepatitis B (Peds <19Y) 11/20/2003    Influenza Vaccine, 6+MO IM (QUADRIVALENT W/PRESERVATIVES) 11/03/2017    MMR 10/02/2003    Poliovirus, inactivated (IPV)  10/02/2003    TDAP (Adacel,Boostrix) 11/23/2014             Allergies:     Allergies   Allergen Reactions    Amoxicillin Hives    Penicillins Hives             Medications:     Current Outpatient Medications Ordered in Epic   Medication    norgestim-eth estrad triphasic (ORTHO TRI-CYCLEN) 0.18/0.215/0.25 MG-35 MCG tablet     No current Epic-ordered facility-administered medications on file.             Review of Systems:     The 10 point Review of Systems is negative other than noted in the HPI            Physical Exam:   Vitals were reviewed      BP: 139/76 Pulse: 90   Resp: 16 SpO2: 98 %      Constitutional:   awake, alert, cooperative, no apparent distress, and appears stated age     Eyes:   Lids and lashes normal, pupils equal, round and reactive to light, extra ocular muscles intact, sclera clear, conjunctiva normal     ENT:   normocepalic, without obvious abnormality     Neck:   supple, symmetrical, trachea midline     Lungs:   no increased work of breathing, good air exchange, and no retractions     Cardiovascular:   regular rate and rhythm     Abdomen:   non-distended     Musculoskeletal:   no lower extremity pitting edema present  there is no redness, warmth, or swelling of the joints  full range of motion noted  motor strength is 5 out of 5 all extremities bilaterally     Neurologic:   Mental Status Exam:  Level of Alertness:   awake  Orientation:   person, place, time  Cranial Nerves:  cranial nerves II-XII are grossly intact  Motor Exam:  moves all extremities well and symmetrically     Neuropsychiatric:   General: normal, calm, and normal eye contact  Level of consciousness: alert / normal  Affect: normal     Skin:   no bruising or bleeding, normal skin color, texture, turgor, and no redness, warmth, or swelling          Data:   All laboratory data reviewed  All cardiac studies reviewed by me.  All imaging studies reviewed by me.    TRANSTHORACIC ECHOCARDIOGRAPHY:  1. The left ventricle is mildly  enlarged. Normal left ventricular systolic  performance with a visually estimated ejection fraction of 65-70%.  2. There is prolapse of both the anterior & posterior mitral valve leaflets  with moderate-severe mitral insufficiency.  3. Normal right ventricular size and systolic performance.  4. There is moderate to severe left atrial enlargement.    TRANSESOPHAGEAL ECHOCARDIOGRAPHY:  Left ventricular size, wall motion and function are normal. The ejection  fraction is 60-65%.  Normal right ventricle size and systolic function.  Systolic flow reversal visualized in left superior pulmonary vein only.  There is severe (4+) mitral regurgitation.  The mitral valve leaflets appear myxomatous.  Severe mitral valve prolapse, anterior leaflet  Severe mitral valve prolapse, posterior leaflet

## 2023-07-26 ENCOUNTER — PREP FOR PROCEDURE (OUTPATIENT)
Dept: ADMINISTRATIVE | Facility: CLINIC | Age: 23
End: 2023-07-26
Payer: COMMERCIAL

## 2023-07-26 DIAGNOSIS — I34.0 MITRAL REGURGITATION: Primary | ICD-10-CM

## 2023-07-26 DIAGNOSIS — I34.1 MITRAL VALVE PROLAPSE: ICD-10-CM

## 2023-07-26 DIAGNOSIS — Q96.9 TURNER SYNDROME: ICD-10-CM

## 2023-07-27 ENCOUNTER — TELEPHONE (OUTPATIENT)
Dept: ADMINISTRATIVE | Facility: CLINIC | Age: 23
End: 2023-07-27
Payer: COMMERCIAL

## 2023-07-27 DIAGNOSIS — I34.1 MITRAL VALVE PROLAPSE: Primary | ICD-10-CM

## 2023-07-27 DIAGNOSIS — I34.0 SEVERE MITRAL INSUFFICIENCY: ICD-10-CM

## 2023-07-27 RX ORDER — PHENYLEPHRINE HCL IN 0.9% NACL 50MG/250ML
.1-6 PLASTIC BAG, INJECTION (ML) INTRAVENOUS CONTINUOUS
Status: CANCELLED | OUTPATIENT
Start: 2023-08-24

## 2023-07-27 RX ORDER — VANCOMYCIN HYDROCHLORIDE 1 G/200ML
1000 INJECTION, SOLUTION INTRAVENOUS
Status: CANCELLED | OUTPATIENT
Start: 2023-08-24

## 2023-07-27 RX ORDER — DEXMEDETOMIDINE HYDROCHLORIDE 4 UG/ML
.2-1.2 INJECTION, SOLUTION INTRAVENOUS CONTINUOUS
Status: CANCELLED | OUTPATIENT
Start: 2023-08-24

## 2023-07-27 RX ORDER — CHLORHEXIDINE GLUCONATE ORAL RINSE 1.2 MG/ML
10 SOLUTION DENTAL ONCE
Status: CANCELLED | OUTPATIENT
Start: 2023-08-24 | End: 2023-07-27

## 2023-07-27 RX ORDER — SODIUM CHLORIDE, SODIUM GLUCONATE, SODIUM ACETATE, POTASSIUM CHLORIDE AND MAGNESIUM CHLORIDE 526; 502; 368; 37; 30 MG/100ML; MG/100ML; MG/100ML; MG/100ML; MG/100ML
1000 INJECTION, SOLUTION INTRAVENOUS
Status: CANCELLED | OUTPATIENT
Start: 2023-08-24 | End: 2023-08-24

## 2023-07-27 RX ORDER — LIDOCAINE 40 MG/G
CREAM TOPICAL
Status: CANCELLED | OUTPATIENT
Start: 2023-07-27

## 2023-07-27 NOTE — TELEPHONE ENCOUNTER
Per task, pt needs to schedule surgery with Dr. Magdaleno. LM asking pt to call back. Will try again later

## 2023-07-27 NOTE — TELEPHONE ENCOUNTER
Pt is at work and she is trying to schedule this surgery. She is getting very angry that she can not get this scheduled. She feels as if she is being disregarded. Please call her back. Thank you

## 2023-07-27 NOTE — TELEPHONE ENCOUNTER
Pt is returning call and states number left in voicemail is not in service. Please call pt back to discuss. Thank you

## 2023-07-29 ENCOUNTER — TRANSFERRED RECORDS (OUTPATIENT)
Dept: HEALTH INFORMATION MANAGEMENT | Facility: CLINIC | Age: 23
End: 2023-07-29
Payer: COMMERCIAL

## 2023-08-08 ENCOUNTER — OFFICE VISIT (OUTPATIENT)
Dept: FAMILY MEDICINE | Facility: CLINIC | Age: 23
End: 2023-08-08
Payer: COMMERCIAL

## 2023-08-08 VITALS
TEMPERATURE: 98.1 F | BODY MASS INDEX: 32.02 KG/M2 | HEIGHT: 62 IN | HEART RATE: 78 BPM | RESPIRATION RATE: 12 BRPM | OXYGEN SATURATION: 99 % | SYSTOLIC BLOOD PRESSURE: 117 MMHG | DIASTOLIC BLOOD PRESSURE: 71 MMHG | WEIGHT: 174 LBS

## 2023-08-08 DIAGNOSIS — I34.1 MITRAL VALVE PROLAPSE: ICD-10-CM

## 2023-08-08 DIAGNOSIS — I34.0 MITRAL VALVE INSUFFICIENCY, UNSPECIFIED ETIOLOGY: ICD-10-CM

## 2023-08-08 DIAGNOSIS — Z01.818 PREOP GENERAL PHYSICAL EXAM: Primary | ICD-10-CM

## 2023-08-08 DIAGNOSIS — F17.290 VAPING NICOTINE DEPENDENCE, TOBACCO PRODUCT: ICD-10-CM

## 2023-08-08 DIAGNOSIS — Q96.9 GONADAL DYSGENESIS: ICD-10-CM

## 2023-08-08 PROCEDURE — 99214 OFFICE O/P EST MOD 30 MIN: CPT | Performed by: NURSE PRACTITIONER

## 2023-08-08 ASSESSMENT — PATIENT HEALTH QUESTIONNAIRE - PHQ9
SUM OF ALL RESPONSES TO PHQ QUESTIONS 1-9: 7
10. IF YOU CHECKED OFF ANY PROBLEMS, HOW DIFFICULT HAVE THESE PROBLEMS MADE IT FOR YOU TO DO YOUR WORK, TAKE CARE OF THINGS AT HOME, OR GET ALONG WITH OTHER PEOPLE: SOMEWHAT DIFFICULT
SUM OF ALL RESPONSES TO PHQ QUESTIONS 1-9: 7

## 2023-08-08 NOTE — PROGRESS NOTES
[facility]  [address] [place] [id number]  Phone: [phone]  Fax: [phone]  Primary Provider: Chito Coppola  Pre-op Performing Provider: YUNIEL PAREDES    PREOPERATIVE EVALUATION:  Today's date: 8/8/2023    Charlotte Blair is a 23 year old female who presents for a preoperative evaluation.      8/8/2023     8:41 AM   Additional Questions   Roomed by Darrius       Surgical Information:  Surgery/Procedure: Mitral valve repair, left atrial appendage ligation with ANESTHESIA TRANSESOPHAGEAL ECHOCARDIOGRAM  Surgery Location: [facility] [place]  Surgeon: Jeff Magdaleno MD  Surgery Date: 08/24/2023  Time of Surgery: 7:10 AM  Where patient plans to recover: At home with family  Fax number for surgical facility: Note does not need to be faxed, will be available electronically in Epic.    Assessment & Plan     The proposed surgical procedure is considered HIGH risk.    Vaping nicotine dependence, tobacco product    - Quit Partner (Tobacco Cessation) Referral; Future    Horn Syndrome      Mitral valve prolapse      Mitral valve insufficiency, unspecified etiology      Preop general physical exam       Risks and Recommendations:  The patient has the following additional risks and recommendations for perioperative complications:   - Consult Hospitalist / IM to assist with post-op medical management  Cardiovascular:   - follow cardiology preop instructions. Labs already ordered.    Antiplatelet or Anticoagulation Medication Instructions:   - Patient is on no antiplatelet or anticoagulation medications.    Additional Medication Instructions:  Patient is to take all scheduled medications on the day of surgery    RECOMMENDATION:  APPROVAL GIVEN to proceed with proposed procedure, without further diagnostic evaluation.        Subjective     HPI related to upcoming procedure: has mitral valve prolapse, and regurg. She has sob with stairs, sob at rest, chest pains,  and heart palpations.           8/8/2023     8:31 AM   Preop Questions   1. Have you ever had a heart attack or stroke? No   2. Have you ever had surgery on your heart or blood vessels, such as a stent placement, a coronary artery bypass, or surgery on an artery in your head, neck, heart, or legs? No   3. Do you have chest pain with activity? YES -    4. Do you have a history of  heart failure? No   5. Do you currently have a cold, bronchitis or symptoms of other infection? No   6. Do you have a cough, shortness of breath, or wheezing? YES -    7. Do you or anyone in your family have previous history of blood clots? No   8. Do you or does anyone in your family have a serious bleeding problem such as prolonged bleeding following surgeries or cuts? No   9. Have you ever had problems with anemia or been told to take iron pills? No   10. Have you had any abnormal blood loss such as black, tarry or bloody stools, or abnormal vaginal bleeding? No   11. Have you ever had a blood transfusion? No   12. Are you willing to have a blood transfusion if it is medically needed before, during, or after your surgery? Yes   13. Have you or any of your relatives ever had problems with anesthesia? No   14. Do you have sleep apnea, excessive snoring or daytime drowsiness? YES - snores but no choking/gasping, AM headaches, etc.   14a. Do you have a CPAP machine? No   15. Do you have any artifical heart valves or other implanted medical devices like a pacemaker, defibrillator, or continuous glucose monitor? No   16. Do you have artificial joints? No   17. Are you allergic to latex? No   18. Is there any chance that you may be pregnant? No       Status of Chronic Conditions:  See problem list for active medical problems.  Problems all longstanding and stable, except as noted/documented.  See ROS for pertinent symptoms related to these conditions.    Review of Systems  CONSTITUTIONAL: NEGATIVE for fever, chills, change in  weight  INTEGUMENTARY/SKIN: NEGATIVE for worrisome rashes, moles or lesions  EYES: NEGATIVE for vision changes or irritation  ENT/MOUTH: NEGATIVE for ear, mouth and throat problems  RESP: NEGATIVE for significant cough or SOB  CV: NEGATIVE for chest pain, palpitations or peripheral edema  GI: NEGATIVE for nausea, abdominal pain, heartburn, or change in bowel habits  : NEGATIVE for frequency, dysuria, or hematuria  MUSCULOSKELETAL: NEGATIVE for significant arthralgias or myalgia  NEURO: NEGATIVE for weakness, dizziness or paresthesias  ENDOCRINE: NEGATIVE for temperature intolerance, skin/hair changes  HEME: NEGATIVE for bleeding problems  PSYCHIATRIC: NEGATIVE for changes in mood or affect    Patient Active Problem List    Diagnosis Date Noted    Mitral valve prolapse 10/20/2022     Priority: Medium     Interpretation Summary -ECHO done 9/8/22      Left ventricular size is mildly dilated. Left ventricular systolic function is   normal. Left ventricular ejection fraction estimated 60 to 65% without wall   motion abnormality.   Normal right ventricular size and systolic function.   Moderate left atrial enlargement.   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 2 jets are   identified 1 directed anteriorly and 1 directed posteriorly with estimated   degree of mitral regurgitation to be moderately severe.   Trace to mild tricuspid insufficiency. Estimate of RV systolic pressure 29   mmHg plus right atrial pressure.   Consider transesophageal echocardiogram to better define the degree of mitral   regurgitation.       Major depression in complete remission (H) 01/14/2022     Priority: Medium    Counseling for birth control, oral contraceptives 04/13/2021     Priority: Medium    Acquired hypothyroidism 11/14/2018     Priority: Medium     Referred to endocrinology 11/14/2018        Class 1 obesity due to excess calories with serious comorbidity and body mass index (BMI) of 31.0 to 31.9 in adult 11/14/2018     " Priority: Medium    Severe mitral insufficiency 11/14/2018     Priority: Medium    Anxiety 12/05/2017     Priority: Medium     Was previously taking Zoloft, now not on any medication      Formatting of this note might be different from the original.  Was previously taking Zoloft, now not on any medication      Horn Syndrome      Priority: Medium     Created by Conversion          Past Medical History:   Diagnosis Date    Anxiety     previous on zoloft    Disease of thyroid gland     hypothyroxine    Gonadal dysgenesis     Created by Conversion     Urinary tract infection     non recurrent     Past Surgical History:   Procedure Laterality Date    WISDOM TOOTH EXTRACTION       Current Outpatient Medications   Medication Sig Dispense Refill    norgestim-eth estrad triphasic (ORTHO TRI-CYCLEN) 0.18/0.215/0.25 MG-35 MCG tablet Take 1 tablet by mouth daily 84 tablet 2       Allergies   Allergen Reactions    Amoxicillin Hives    Penicillins Hives        Social History     Tobacco Use    Smoking status: Former     Types: Cigarettes    Smokeless tobacco: Never   Substance Use Topics    Alcohol use: Not Currently     Comment: very rare     Family History   Problem Relation Age of Onset    Hypertension Mother     Coronary Artery Disease Father     Myocardial Infarction Father 61    Ovarian cysts Sister     Hyperlipidemia Brother     No Known Problems Maternal Grandmother     Coronary Artery Disease Maternal Grandfather     Myocardial Infarction Maternal Grandfather     Parkinsonism Paternal Grandmother     Suicidality Paternal Grandfather      History   Drug Use    Types: Marijuana     Comment: edibles and smoking occassional         Objective     /71   Pulse 78   Temp 98.1  F (36.7  C) (Oral)   Resp 12   Ht 1.562 m (5' 1.5\")   Wt 78.9 kg (174 lb)   SpO2 99%   BMI 32.34 kg/m      Physical Exam    GENERAL APPEARANCE: healthy, alert and no distress     EYES: EOMI, PERRL     HENT: ear canals and TM's normal and " nose and mouth without ulcers or lesions     NECK: no adenopathy, no asymmetry, masses, or scars and thyroid normal to palpation     RESP: lungs clear to auscultation - no rales, rhonchi or wheezes     CV: regular rates and rhythm, normal S1 S2, no S3 or S4 and no murmur, click or rub     ABDOMEN:  soft, nontender, no HSM or masses and bowel sounds normal     MS: extremities normal- no gross deformities noted, no evidence of inflammation in joints, FROM in all extremities.     SKIN: no suspicious lesions or rashes     NEURO: Normal strength and tone, sensory exam grossly normal, mentation intact and speech normal     PSYCH: mentation appears normal. and affect normal/bright     LYMPHATICS: No cervical adenopathy    Recent Labs   Lab Test 01/14/22  1518      POTASSIUM 4.5   CR 0.61        Diagnostics:  No results found for this or any previous visit (from the past 720 hour(s)).   No EKG this visit, completed in the last 90 days.    Revised Cardiac Risk Index (RCRI):  The patient has the following serious cardiovascular risks for perioperative complications:   - High risk surgery (>5% cardiac complication risk) = 1 point          Signed Electronically by: CALISTA Spangler CNP  Copy of this evaluation report is provided to requesting physician.      Patient Health Questionnaire (Submitted on 8/8/2023)  If you checked off any problems, how difficult have these problems made it for you to do your work, take care of things at home, or get along with other people?: Somewhat difficult  PHQ9 TOTAL SCORE: 7

## 2023-08-08 NOTE — PATIENT INSTRUCTIONS
Follow Cardiology orders for pre-op medications. Typically stop all NSAID's and vitamins 7 days prior. You should be able to stay on your Birth Control pill.

## 2023-08-16 ENCOUNTER — HOSPITAL ENCOUNTER (OUTPATIENT)
Dept: SURGERY | Facility: HOSPITAL | Age: 23
Discharge: HOME | End: 2023-08-16
Admitting: SURGERY
Payer: COMMERCIAL

## 2023-08-16 ENCOUNTER — TELEPHONE (OUTPATIENT)
Dept: CARDIOLOGY | Facility: CLINIC | Age: 23
End: 2023-08-16

## 2023-08-16 ENCOUNTER — HOSPITAL ENCOUNTER (OUTPATIENT)
Dept: RADIOLOGY | Facility: HOSPITAL | Age: 23
Discharge: HOME | End: 2023-08-16
Attending: SURGERY | Admitting: SURGERY
Payer: COMMERCIAL

## 2023-08-16 ENCOUNTER — ANESTHESIA EVENT (OUTPATIENT)
Dept: SURGERY | Facility: HOSPITAL | Age: 23
DRG: 220 | End: 2023-08-16
Payer: COMMERCIAL

## 2023-08-16 VITALS
HEART RATE: 87 BPM | DIASTOLIC BLOOD PRESSURE: 85 MMHG | RESPIRATION RATE: 18 BRPM | BODY MASS INDEX: 32.4 KG/M2 | SYSTOLIC BLOOD PRESSURE: 129 MMHG | OXYGEN SATURATION: 100 % | TEMPERATURE: 97.2 F | WEIGHT: 174.3 LBS

## 2023-08-16 DIAGNOSIS — I34.1 MITRAL VALVE PROLAPSE: ICD-10-CM

## 2023-08-16 DIAGNOSIS — I34.1 MITRAL VALVE PROLAPSE: Primary | ICD-10-CM

## 2023-08-16 DIAGNOSIS — I34.0 SEVERE MITRAL INSUFFICIENCY: ICD-10-CM

## 2023-08-16 DIAGNOSIS — N30.00 ACUTE CYSTITIS WITHOUT HEMATURIA: Primary | ICD-10-CM

## 2023-08-16 LAB
ABO/RH(D): NORMAL
ALBUMIN SERPL BCG-MCNC: 4.8 G/DL (ref 3.5–5.2)
ALBUMIN UR-MCNC: NEGATIVE MG/DL
ALP SERPL-CCNC: 75 U/L (ref 35–104)
ALT SERPL W P-5'-P-CCNC: 26 U/L (ref 0–50)
ANION GAP SERPL CALCULATED.3IONS-SCNC: 11 MMOL/L (ref 7–15)
ANTIBODY SCREEN: NEGATIVE
APPEARANCE UR: CLEAR
APTT PPP: 26 SECONDS (ref 22–38)
AST SERPL W P-5'-P-CCNC: 23 U/L (ref 0–45)
BACTERIA #/AREA URNS HPF: ABNORMAL /HPF
BILIRUB SERPL-MCNC: 0.5 MG/DL
BILIRUB UR QL STRIP: NEGATIVE
BUN SERPL-MCNC: 13.9 MG/DL (ref 6–20)
CALCIUM SERPL-MCNC: 9.6 MG/DL (ref 8.6–10)
CHLORIDE SERPL-SCNC: 104 MMOL/L (ref 98–107)
COLOR UR AUTO: ABNORMAL
CREAT SERPL-MCNC: 0.59 MG/DL (ref 0.51–0.95)
DEPRECATED HCO3 PLAS-SCNC: 26 MMOL/L (ref 22–29)
ERYTHROCYTE [DISTWIDTH] IN BLOOD BY AUTOMATED COUNT: 11.8 % (ref 10–15)
GFR SERPL CREATININE-BSD FRML MDRD: >90 ML/MIN/1.73M2
GLUCOSE SERPL-MCNC: 105 MG/DL (ref 70–99)
GLUCOSE UR STRIP-MCNC: NEGATIVE MG/DL
HBA1C MFR BLD: 5.1 %
HCG UR QL: NEGATIVE
HCT VFR BLD AUTO: 42.1 % (ref 35–47)
HGB BLD-MCNC: 13.7 G/DL (ref 11.7–15.7)
HGB UR QL STRIP: NEGATIVE
INR PPP: 0.88 (ref 0.85–1.15)
KETONES UR STRIP-MCNC: NEGATIVE MG/DL
LEUKOCYTE ESTERASE UR QL STRIP: ABNORMAL
MAGNESIUM SERPL-MCNC: 2.1 MG/DL (ref 1.7–2.3)
MCH RBC QN AUTO: 29.5 PG (ref 26.5–33)
MCHC RBC AUTO-ENTMCNC: 32.5 G/DL (ref 31.5–36.5)
MCV RBC AUTO: 91 FL (ref 78–100)
MUCOUS THREADS #/AREA URNS LPF: PRESENT /LPF
NITRATE UR QL: NEGATIVE
PH UR STRIP: 5.5 [PH] (ref 5–7)
PLATELET # BLD AUTO: 227 10E3/UL (ref 150–450)
POTASSIUM SERPL-SCNC: 4.5 MMOL/L (ref 3.4–5.3)
PREALB SERPL IA-MCNC: 33 MG/DL (ref 15–45)
PROT SERPL-MCNC: 7.2 G/DL (ref 6.4–8.3)
RBC # BLD AUTO: 4.65 10E6/UL (ref 3.8–5.2)
RBC URINE: 3 /HPF
SODIUM SERPL-SCNC: 141 MMOL/L (ref 136–145)
SP GR UR STRIP: 1.02 (ref 1–1.03)
SPECIMEN EXPIRATION DATE: NORMAL
SQUAMOUS EPITHELIAL: 4 /HPF
UROBILINOGEN UR STRIP-MCNC: <2 MG/DL
WBC # BLD AUTO: 6.7 10E3/UL (ref 4–11)
WBC URINE: 7 /HPF

## 2023-08-16 PROCEDURE — 80053 COMPREHEN METABOLIC PANEL: CPT | Performed by: SURGERY

## 2023-08-16 PROCEDURE — 81025 URINE PREGNANCY TEST: CPT | Performed by: SURGERY

## 2023-08-16 PROCEDURE — 83735 ASSAY OF MAGNESIUM: CPT | Performed by: SURGERY

## 2023-08-16 PROCEDURE — 71046 X-RAY EXAM CHEST 2 VIEWS: CPT

## 2023-08-16 PROCEDURE — 93010 ELECTROCARDIOGRAM REPORT: CPT | Performed by: INTERNAL MEDICINE

## 2023-08-16 PROCEDURE — 81001 URINALYSIS AUTO W/SCOPE: CPT | Performed by: SURGERY

## 2023-08-16 PROCEDURE — 85610 PROTHROMBIN TIME: CPT | Performed by: SURGERY

## 2023-08-16 PROCEDURE — 86850 RBC ANTIBODY SCREEN: CPT | Performed by: SURGERY

## 2023-08-16 PROCEDURE — 85027 COMPLETE CBC AUTOMATED: CPT | Performed by: SURGERY

## 2023-08-16 PROCEDURE — 84134 ASSAY OF PREALBUMIN: CPT | Performed by: SURGERY

## 2023-08-16 PROCEDURE — 93005 ELECTROCARDIOGRAM TRACING: CPT

## 2023-08-16 PROCEDURE — 83036 HEMOGLOBIN GLYCOSYLATED A1C: CPT | Performed by: SURGERY

## 2023-08-16 PROCEDURE — 85730 THROMBOPLASTIN TIME PARTIAL: CPT | Performed by: SURGERY

## 2023-08-16 PROCEDURE — 36415 COLL VENOUS BLD VENIPUNCTURE: CPT | Performed by: SURGERY

## 2023-08-16 RX ORDER — IBUPROFEN 200 MG
400 TABLET ORAL EVERY 12 HOURS PRN
COMMUNITY
End: 2023-09-21

## 2023-08-16 RX ORDER — SODIUM CHLORIDE, SODIUM LACTATE, POTASSIUM CHLORIDE, CALCIUM CHLORIDE 600; 310; 30; 20 MG/100ML; MG/100ML; MG/100ML; MG/100ML
INJECTION, SOLUTION INTRAVENOUS CONTINUOUS
Status: DISCONTINUED | OUTPATIENT
Start: 2023-08-16 | End: 2023-08-17 | Stop reason: HOSPADM

## 2023-08-16 RX ORDER — NITROFURANTOIN 25; 75 MG/1; MG/1
100 CAPSULE ORAL 2 TIMES DAILY
Qty: 10 CAPSULE | Refills: 0 | Status: ON HOLD | OUTPATIENT
Start: 2023-08-16 | End: 2023-08-24

## 2023-08-16 RX ORDER — METHADONE HYDROCHLORIDE 10 MG/ML
20 INJECTION, SOLUTION INTRAMUSCULAR; INTRAVENOUS; SUBCUTANEOUS ONCE
Status: DISCONTINUED | OUTPATIENT
Start: 2023-08-16 | End: 2023-08-17 | Stop reason: HOSPADM

## 2023-08-16 RX ORDER — MAGNESIUM SULFATE 4 G/50ML
4 INJECTION INTRAVENOUS ONCE
Status: DISCONTINUED | OUTPATIENT
Start: 2023-08-16 | End: 2023-08-17 | Stop reason: HOSPADM

## 2023-08-16 RX ORDER — LIDOCAINE 40 MG/G
CREAM TOPICAL
Status: DISCONTINUED | OUTPATIENT
Start: 2023-08-16 | End: 2023-08-17 | Stop reason: HOSPADM

## 2023-08-16 NOTE — PROGRESS NOTES
SPIRITUAL HEALTH SERVICES NOTE  [facility]; [facility]/Pre-OP    SPIRITUAL ASSESSMENT  Feels as prepared for surgery as she can be  Good support around her  No meaningful spiritual beliefs identified    SPIRITUAL CARE NOTE  Pre-surgical visit. Charlotte is currently scheduled to have a MVR with Dr. Magdaleno on Thursday, August 24th. She has known that she may need surgery since she was 8 years old. In recent months she has experienced SOB and fatigue. She feels as mentally prepared for surgery as she can be. Charlotte has several people in her life who have either had an MVR or heart surgery and she has been able to talk with them about their experiences. She is prepared for a lengthy recovery.     Charlotte identifies her mother and several siblings as sources of support and states that she feels comfortable asking for help. She denies any meaningful spiritual beliefs. Her hope is that this surgery will allow lead to greater energy and stamina when she is recovered. I encouraged her to be patient with herself as her body heals.     Time Spent with Patient/Family: 15 minutes    Plan of Care: Will follow-up with patient/family after surgery    Babs Bagley M.Div.      Office: [phone] (for non-urgent requests)  Please Vocera or page through [facility] for time-sensitive requests

## 2023-08-16 NOTE — PHARMACY
Pharmacist Admission Medication History    Admission medication history is complete. The information provided in this note is only as accurate as the sources available at the time of the update.    Medication reconciliation/reorder completed by provider prior to medication history? No    Information Source(s): Patient via in-person    Pertinent Information: Completed med rec at bedside with patient - updated med list to include ibuprofen 200-400 mg BID q 12 hours as needed    Changes made to PTA medication list:  Added: Ibuprofen  Deleted: None  Changed: None    Medication Affordability:       Allergies reviewed with patient and updates made in EHR: yes    Medication History Completed By: JEANNE CHIRINOS [facility] 8/16/2023 9:46 AM    Prior to Admission medications    Medication Sig Last Dose Taking? Auth Provider Long Term End Date   ibuprofen (ADVIL/MOTRIN) 200 MG tablet Take 200-400 mg by mouth every 12 hours as needed for pain Past Month at prn Yes Unknown, Entered By History     norgestim-eth estrad triphasic (ORTHO TRI-CYCLEN) 0.18/0.215/0.25 MG-35 MCG tablet Take 1 tablet by mouth daily  at every evening  Chito Coppola MD Yes

## 2023-08-16 NOTE — ANESTHESIA PREPROCEDURE EVALUATION
Anesthesia Pre-Procedure Evaluation    Patient: Charlotte Blair   MRN: [id number] : 2000        Procedure : Procedure(s):  Mitral valve repair, left atrial appendage ligation with ANESTHESIA TRANSESOPHAGEAL ECHOCARDIOGRAM  Pre-Admission Testing       Past Medical History:   Diagnosis Date    Anxiety     previous on zoloft    Disease of thyroid gland     hypothyroxine    Gonadal dysgenesis     Created by Conversion     Urinary tract infection     non recurrent      Past Surgical History:   Procedure Laterality Date    WISDOM TOOTH EXTRACTION        Allergies   Allergen Reactions    Amoxicillin Hives    Penicillins Hives      Social History     Tobacco Use    Smoking status: Former     Types: Cigarettes    Smokeless tobacco: Never   Substance Use Topics    Alcohol use: Not Currently     Comment: very rare      Wt Readings from Last 1 Encounters:   23 79.1 kg (174 lb 4.8 oz)        Anesthesia Evaluation            ROS/MED HX  ENT/Pulmonary:  - neg pulmonary ROS     Neurologic:       Cardiovascular:     (+)  - -   -  - -                           valvular problems/murmurs type: MR          METS/Exercise Tolerance:     Hematologic:       Musculoskeletal:       GI/Hepatic:  - neg GI/hepatic ROS     Renal/Genitourinary:  - neg Renal ROS     Endo:     (+)          thyroid problem,     Obesity (BMI 32),       Psychiatric/Substance Use:     (+) psychiatric history anxiety       Infectious Disease:       Malignancy:       Other:            Physical Exam    Airway        Mallampati: II   TM distance: > 3 FB   Neck ROM: full   Mouth opening: > 3 cm    Respiratory Devices and Support         Dental       (+) Completely normal teeth      Cardiovascular          Rhythm and rate: regular and normal   (+) murmur       Pulmonary           breath sounds clear to auscultation           OUTSIDE LABS:  CBC: No results found for: WBC, HGB, HCT, PLT  BMP:   Lab Results   Component Value Date     2022    POTASSIUM  4.5 01/14/2022    CHLORIDE 104 01/14/2022    CO2 25 01/14/2022    BUN 13 01/14/2022    CR 0.61 01/14/2022    GLC 89 01/14/2022    GLC 98 04/13/2021     COAGS: No results found for: PTT, INR, FIBR  POC:   Lab Results   Component Value Date    HCG Negative 08/25/2018     HEPATIC:   Lab Results   Component Value Date    ALBUMIN 4.1 01/14/2022    PROTTOTAL 6.8 01/14/2022    ALT 30 01/14/2022    AST 27 01/14/2022    ALKPHOS 58 01/14/2022    BILITOTAL 0.5 01/14/2022     OTHER:   Lab Results   Component Value Date    A1C 5.0 02/17/2020    JAMSHID 9.7 01/14/2022    TSH 4.02 01/14/2022       Anesthesia Plan    ASA Status:  3       Anesthesia Type: General.     - Airway: ETT   Induction: Intravenous.   Maintenance: Balanced.   Techniques and Equipment:     - Lines/Monitors: Arterial Line, Central Line, CVP, EMETERIO            EMETERIO Absolute Contra-indication: NONE            EMETERIO Relative Contra-indication: NONE     - Blood: Blood in Room, PRBC     - Drips/Meds: Dexmed. infusion, Epinephrine, Phenylephrine, Ketamine     Consents    Anesthesia Plan(s) and associated risks, benefits, and realistic alternatives discussed. Questions answered and patient/representative(s) expressed understanding.     - Discussed: Risks, Benefits and Alternatives for BOTH SEDATION and the PROCEDURE were discussed     - Discussed with:  Patient      - Extended Intubation/Ventilatory Support Discussed: Yes.      - Patient is DNR/DNI Status: No     Use of blood products discussed: Yes.     - Discussed with: Patient.     - Consented: consented to blood products            Reason for refusal: other.     Postoperative Care    Pain management: Multi-modal analgesia.   PONV prophylaxis: Ondansetron (or other 5HT-3)     Comments:                Telma Pizano MD

## 2023-08-16 NOTE — TELEPHONE ENCOUNTER
Called and left  for pt informing her to start Macrobid for positive UA done today. CV IRVIN prescribed to local [facility] pharmacy.

## 2023-08-16 NOTE — DISCHARGE SUMMARY
Pt. Reports that she vapes nicotine.  Author encouraged pt. to stop vaping and explained the negative effects of vaping on her ability to heal after surgery. Pt. Stated she would try to quit.

## 2023-08-17 LAB
ATRIAL RATE - MUSE: 87 BPM
DIASTOLIC BLOOD PRESSURE - MUSE: NORMAL MMHG
INTERPRETATION ECG - MUSE: NORMAL
P AXIS - MUSE: 31 DEGREES
PR INTERVAL - MUSE: 136 MS
QRS DURATION - MUSE: 100 MS
QT - MUSE: 386 MS
QTC - MUSE: 464 MS
R AXIS - MUSE: 97 DEGREES
SYSTOLIC BLOOD PRESSURE - MUSE: NORMAL MMHG
T AXIS - MUSE: 7 DEGREES
VENTRICULAR RATE- MUSE: 87 BPM

## 2023-08-23 ENCOUNTER — TELEPHONE (OUTPATIENT)
Dept: CARDIOLOGY | Facility: CLINIC | Age: 23
End: 2023-08-23
Payer: COMMERCIAL

## 2023-08-24 ENCOUNTER — HOSPITAL ENCOUNTER (INPATIENT)
Facility: HOSPITAL | Age: 23
LOS: 4 days | Discharge: HOME | DRG: 220 | End: 2023-08-28
Attending: SURGERY | Admitting: SURGERY
Payer: COMMERCIAL

## 2023-08-24 ENCOUNTER — APPOINTMENT (OUTPATIENT)
Dept: RADIOLOGY | Facility: HOSPITAL | Age: 23
DRG: 220 | End: 2023-08-24
Attending: PHYSICIAN ASSISTANT
Payer: COMMERCIAL

## 2023-08-24 ENCOUNTER — ANESTHESIA (OUTPATIENT)
Dept: ANESTHESIOLOGY | Facility: HOSPITAL | Age: 23
DRG: 220 | End: 2023-08-24
Payer: COMMERCIAL

## 2023-08-24 DIAGNOSIS — Z98.890 S/P MITRAL VALVE REPAIR: Primary | ICD-10-CM

## 2023-08-24 PROBLEM — I34.0 MITRAL REGURGITATION: Status: ACTIVE | Noted: 2023-08-24

## 2023-08-24 LAB
ALBUMIN SERPL BCG-MCNC: 3.6 G/DL (ref 3.5–5.2)
ALP SERPL-CCNC: 40 U/L (ref 35–104)
ALT SERPL W P-5'-P-CCNC: 17 U/L (ref 0–50)
ANION GAP SERPL CALCULATED.3IONS-SCNC: 9 MMOL/L (ref 7–15)
APTT PPP: 42 SECONDS (ref 22–38)
APTT PPP: 45 SECONDS (ref 22–38)
AST SERPL W P-5'-P-CCNC: 48 U/L (ref 0–45)
ATRIAL RATE - MUSE: 83 BPM
BASE EXCESS BLDA CALC-SCNC: -1 MMOL/L
BASE EXCESS BLDA CALC-SCNC: -1.1 MMOL/L
BASE EXCESS BLDA CALC-SCNC: -2 MMOL/L
BASE EXCESS BLDA CALC-SCNC: -2.9 MMOL/L
BASE EXCESS BLDA CALC-SCNC: -3 MMOL/L
BASE EXCESS BLDA CALC-SCNC: -7.7 MMOL/L
BASE EXCESS BLDV CALC-SCNC: 0.8 MMOL/L
BILIRUB SERPL-MCNC: 0.8 MG/DL
BLD PROD TYP BPU: NORMAL
BLD PROD TYP BPU: NORMAL
BLOOD COMPONENT TYPE: NORMAL
BLOOD COMPONENT TYPE: NORMAL
BUN SERPL-MCNC: 10.1 MG/DL (ref 6–20)
CA-I BLD-MCNC: 0.89 MMOL/L (ref 1.11–1.3)
CA-I BLD-MCNC: 0.98 MMOL/L (ref 1.11–1.3)
CA-I BLD-MCNC: 0.98 MMOL/L (ref 1.11–1.3)
CA-I BLD-MCNC: 1.1 MG/DL
CA-I BLD-MCNC: 1.15 MMOL/L (ref 1.11–1.3)
CA-I BLD-MCNC: 1.15 MMOL/L (ref 1.11–1.3)
CALCIUM SERPL-MCNC: 7.6 MG/DL (ref 8.6–10)
CALCIUM, IONIZED MEASURED: 1.04 MMOL/L (ref 1.11–1.3)
CALCIUM, IONIZED MEASURED: 1.07 MMOL/L (ref 1.11–1.3)
CALCIUM, IONIZED MEASURED: 1.09 MMOL/L (ref 1.11–1.3)
CHLORIDE SERPL-SCNC: 113 MMOL/L (ref 98–107)
CODING SYSTEM: NORMAL
CODING SYSTEM: NORMAL
COHGB MFR BLD: 100 % (ref 96–97)
COHGB MFR BLD: 97.3 % (ref 96–97)
CREAT SERPL-MCNC: 0.64 MG/DL (ref 0.51–0.95)
CROSSMATCH: NORMAL
CROSSMATCH: NORMAL
DEPRECATED HCO3 PLAS-SCNC: 22 MMOL/L (ref 22–29)
DIASTOLIC BLOOD PRESSURE - MUSE: NORMAL MMHG
ERYTHROCYTE [DISTWIDTH] IN BLOOD BY AUTOMATED COUNT: 11.6 % (ref 10–15)
ERYTHROCYTE [DISTWIDTH] IN BLOOD BY AUTOMATED COUNT: 11.6 % (ref 10–15)
FIBRINOGEN PPP-MCNC: 162 MG/DL (ref 170–490)
GFR SERPL CREATININE-BSD FRML MDRD: >90 ML/MIN/1.73M2
GLUCOSE BLD-MCNC: 105 MG/DL (ref 70–125)
GLUCOSE BLD-MCNC: 121 MG/DL (ref 70–125)
GLUCOSE BLD-MCNC: 140 MG/DL (ref 70–125)
GLUCOSE BLD-MCNC: 149 MG/DL (ref 70–125)
GLUCOSE BLD-MCNC: 158 MG/DL (ref 70–125)
GLUCOSE BLD-MCNC: 164 MG/DL (ref 70–125)
GLUCOSE BLDC GLUCOMTR-MCNC: 111 MG/DL (ref 70–99)
GLUCOSE BLDC GLUCOMTR-MCNC: 114 MG/DL (ref 70–99)
GLUCOSE BLDC GLUCOMTR-MCNC: 127 MG/DL (ref 70–99)
GLUCOSE BLDC GLUCOMTR-MCNC: 127 MG/DL (ref 70–99)
GLUCOSE BLDC GLUCOMTR-MCNC: 137 MG/DL (ref 70–99)
GLUCOSE BLDC GLUCOMTR-MCNC: 137 MG/DL (ref 70–99)
GLUCOSE BLDC GLUCOMTR-MCNC: 138 MG/DL (ref 70–99)
GLUCOSE SERPL-MCNC: 138 MG/DL (ref 70–99)
HCG UR QL: NEGATIVE
HCO3 BLD-SCNC: 22 MMOL/L (ref 23–29)
HCO3 BLDA-SCNC: 18 MMOL/L (ref 23–29)
HCO3 BLDA-SCNC: 22 MMOL/L (ref 23–29)
HCO3 BLDA-SCNC: 23 MMOL/L (ref 23–29)
HCO3 BLDA-SCNC: 24 MMOL/L (ref 23–29)
HCO3 BLDA-SCNC: 24 MMOL/L (ref 23–29)
HCO3 BLDV-SCNC: 25 MMOL/L (ref 24–30)
HCT VFR BLD AUTO: 25.8 % (ref 35–47)
HCT VFR BLD AUTO: 32.3 % (ref 35–47)
HGB BLD-MCNC: 11 G/DL (ref 11.7–15.7)
HGB BLD-MCNC: 12.3 G/DL (ref 11.7–15.7)
HGB BLD-MCNC: 8.6 G/DL (ref 11.7–15.7)
HGB BLD-MCNC: 9 G/DL (ref 11.7–15.7)
HGB BLD-MCNC: 9.2 G/DL (ref 11.7–15.7)
HGB BLD-MCNC: 9.2 G/DL (ref 11.7–15.7)
HGB BLD-MCNC: 9.3 G/DL (ref 11.7–15.7)
HGB BLD-MCNC: 9.7 G/DL (ref 11.7–15.7)
INR PPP: 1.39 (ref 0.85–1.15)
INR PPP: 1.53 (ref 0.85–1.15)
INTERPRETATION ECG - MUSE: NORMAL
ION CA PH 7.4: 1.04 MMOL/L (ref 1.11–1.3)
ION CA PH 7.4: 1.05 MMOL/L (ref 1.11–1.3)
ION CA PH 7.4: 1.07 MMOL/L (ref 1.11–1.3)
ISSUE DATE AND TIME: NORMAL
ISSUE DATE AND TIME: NORMAL
LACTATE BLD-SCNC: 0.8 MMOL/L (ref 0.7–2)
LACTATE BLD-SCNC: 0.9 MMOL/L (ref 0.7–2)
LACTATE BLD-SCNC: 1.2 MMOL/L (ref 0.7–2)
LACTATE BLD-SCNC: 1.3 MMOL/L (ref 0.7–2)
LACTATE BLD-SCNC: 2.3 MMOL/L (ref 0.7–2)
LACTATE SERPL-SCNC: 1.9 MMOL/L (ref 0.7–2)
MAGNESIUM SERPL-MCNC: 3.4 MG/DL (ref 1.7–2.3)
MCH RBC QN AUTO: 29.9 PG (ref 26.5–33)
MCH RBC QN AUTO: 30.1 PG (ref 26.5–33)
MCHC RBC AUTO-ENTMCNC: 33.3 G/DL (ref 31.5–36.5)
MCHC RBC AUTO-ENTMCNC: 34.1 G/DL (ref 31.5–36.5)
MCV RBC AUTO: 89 FL (ref 78–100)
MCV RBC AUTO: 90 FL (ref 78–100)
OXYHGB MFR BLD: >98.5 % (ref 96–97)
P AXIS - MUSE: 81 DEGREES
PCO2 BLD: 37 MM HG (ref 35–45)
PCO2 BLDA: 37 MM HG (ref 35–45)
PCO2 BLDA: 38 MM HG (ref 35–45)
PCO2 BLDA: 38 MM HG (ref 35–45)
PCO2 BLDA: 43 MM HG (ref 35–45)
PCO2 BLDA: 45 MM HG (ref 35–45)
PCO2 BLDV: 51 MM HG (ref 35–50)
PH BLD: 7.38 [PH] (ref 7.37–7.44)
PH BLDA: 7.25 [PH] (ref 7.37–7.44)
PH BLDA: 7.35 [PH] (ref 7.37–7.44)
PH BLDA: 7.37 [PH] (ref 7.37–7.44)
PH BLDA: 7.4 [PH] (ref 7.37–7.44)
PH BLDA: 7.41 [PH] (ref 7.37–7.44)
PH BLDV: 7.33 [PH] (ref 7.35–7.45)
PH: 7.35 (ref 7.35–7.45)
PH: 7.38 (ref 7.35–7.45)
PH: 7.41 (ref 7.35–7.45)
PHOSPHATE SERPL-MCNC: 3.3 MG/DL (ref 2.5–4.5)
PLATELET # BLD AUTO: 139 10E3/UL (ref 150–450)
PLATELET # BLD AUTO: 158 10E3/UL (ref 150–450)
PO2 BLD: 266 MM HG (ref 80–90)
PO2 BLDA: 432 MM HG (ref 80–90)
PO2 BLDA: 435 MM HG (ref 80–90)
PO2 BLDA: 455 MM HG (ref 80–90)
PO2 BLDA: 84 MM HG (ref 80–90)
PO2 BLDA: >488 MM HG (ref 80–90)
PO2 BLDV: 57 MM HG (ref 25–47)
POTASSIUM BLD-SCNC: 3.5 MMOL/L (ref 3.5–5)
POTASSIUM BLD-SCNC: 4 MMOL/L (ref 3.5–5)
POTASSIUM BLD-SCNC: 4.3 MMOL/L (ref 3.5–5)
POTASSIUM BLD-SCNC: 4.7 MMOL/L (ref 3.5–5)
POTASSIUM BLD-SCNC: 4.8 MMOL/L (ref 3.5–5)
POTASSIUM SERPL-SCNC: 4.2 MMOL/L (ref 3.4–5.3)
POTASSIUM SERPL-SCNC: 4.2 MMOL/L (ref 3.4–5.3)
PR INTERVAL - MUSE: 206 MS
PROT SERPL-MCNC: 4.9 G/DL (ref 6.4–8.3)
QRS DURATION - MUSE: 104 MS
QT - MUSE: 400 MS
QTC - MUSE: 470 MS
R AXIS - MUSE: 119 DEGREES
RBC # BLD AUTO: 2.88 10E6/UL (ref 3.8–5.2)
RBC # BLD AUTO: 3.65 10E6/UL (ref 3.8–5.2)
SATV LHE POCT: 85.9 % (ref 70–75)
SODIUM BLD-SCNC: 138 MMOL/L (ref 136–145)
SODIUM BLD-SCNC: 140 MMOL/L (ref 136–145)
SODIUM BLD-SCNC: 140 MMOL/L (ref 136–145)
SODIUM BLD-SCNC: 142 MMOL/L (ref 136–145)
SODIUM BLD-SCNC: 143 MMOL/L (ref 136–145)
SODIUM SERPL-SCNC: 144 MMOL/L (ref 136–145)
SYSTOLIC BLOOD PRESSURE - MUSE: NORMAL MMHG
T AXIS - MUSE: 33 DEGREES
TEMPERATURE: 37 DEGREES C
UNIT ABO/RH: NORMAL
UNIT ABO/RH: NORMAL
UNIT NUMBER: NORMAL
UNIT NUMBER: NORMAL
UNIT STATUS: NORMAL
UNIT STATUS: NORMAL
UNIT TYPE ISBT: 7300
UNIT TYPE ISBT: 7300
VENTRICULAR RATE- MUSE: 83 BPM
WBC # BLD AUTO: 13.2 10E3/UL (ref 4–11)
WBC # BLD AUTO: 9.3 10E3/UL (ref 4–11)

## 2023-08-24 PROCEDURE — 84132 ASSAY OF SERUM POTASSIUM: CPT

## 2023-08-24 PROCEDURE — 200N000001 HC R&B ICU

## 2023-08-24 PROCEDURE — 82330 ASSAY OF CALCIUM: CPT

## 2023-08-24 PROCEDURE — 83605 ASSAY OF LACTIC ACID: CPT | Performed by: PHYSICIAN ASSISTANT

## 2023-08-24 PROCEDURE — 93005 ELECTROCARDIOGRAM TRACING: CPT

## 2023-08-24 PROCEDURE — 258N000003 HC RX IP 258 OP 636: Performed by: SURGERY

## 2023-08-24 PROCEDURE — 84450 TRANSFERASE (AST) (SGOT): CPT | Performed by: PHYSICIAN ASSISTANT

## 2023-08-24 PROCEDURE — 999N000259 HC STATISTIC EXTUBATION

## 2023-08-24 PROCEDURE — 93005 ELECTROCARDIOGRAM TRACING: CPT | Performed by: PHYSICIAN ASSISTANT

## 2023-08-24 PROCEDURE — C1898 LEAD, PMKR, OTHER THAN TRANS: HCPCS | Performed by: SURGERY

## 2023-08-24 PROCEDURE — 88305 TISSUE EXAM BY PATHOLOGIST: CPT | Mod: TC | Performed by: SURGERY

## 2023-08-24 PROCEDURE — 999N000141 HC STATISTIC PRE-PROCEDURE NURSING ASSESSMENT: Performed by: SURGERY

## 2023-08-24 PROCEDURE — 99291 CRITICAL CARE FIRST HOUR: CPT | Performed by: NURSE PRACTITIONER

## 2023-08-24 PROCEDURE — 85018 HEMOGLOBIN: CPT | Performed by: PHYSICIAN ASSISTANT

## 2023-08-24 PROCEDURE — 02B70ZK EXCISION OF LEFT ATRIAL APPENDAGE, OPEN APPROACH: ICD-10-PCS | Performed by: SURGERY

## 2023-08-24 PROCEDURE — 84155 ASSAY OF PROTEIN SERUM: CPT | Performed by: PHYSICIAN ASSISTANT

## 2023-08-24 PROCEDURE — 250N000011 HC RX IP 250 OP 636: Performed by: ANESTHESIOLOGY

## 2023-08-24 PROCEDURE — 33427 REPAIR OF MITRAL VALVE: CPT | Performed by: SURGERY

## 2023-08-24 PROCEDURE — 250N000009 HC RX 250: Performed by: SURGERY

## 2023-08-24 PROCEDURE — 85384 FIBRINOGEN ACTIVITY: CPT | Performed by: NURSE ANESTHETIST, CERTIFIED REGISTERED

## 2023-08-24 PROCEDURE — 250N000024 HC ISOFLURANE, PER MIN: Performed by: SURGERY

## 2023-08-24 PROCEDURE — 82805 BLOOD GASES W/O2 SATURATION: CPT | Performed by: PHYSICIAN ASSISTANT

## 2023-08-24 PROCEDURE — 94002 VENT MGMT INPAT INIT DAY: CPT

## 2023-08-24 PROCEDURE — 999N000157 HC STATISTIC RCP TIME EA 10 MIN

## 2023-08-24 PROCEDURE — 258N000003 HC RX IP 258 OP 636: Performed by: PHYSICIAN ASSISTANT

## 2023-08-24 PROCEDURE — 250N000011 HC RX IP 250 OP 636: Mod: JZ | Performed by: ANESTHESIOLOGY

## 2023-08-24 PROCEDURE — 85027 COMPLETE CBC AUTOMATED: CPT | Performed by: NURSE ANESTHETIST, CERTIFIED REGISTERED

## 2023-08-24 PROCEDURE — 360N000079 HC SURGERY LEVEL 6, PER MIN: Performed by: SURGERY

## 2023-08-24 PROCEDURE — 84132 ASSAY OF SERUM POTASSIUM: CPT | Performed by: PHYSICIAN ASSISTANT

## 2023-08-24 PROCEDURE — 5A1221Z PERFORMANCE OF CARDIAC OUTPUT, CONTINUOUS: ICD-10-PCS | Performed by: SURGERY

## 2023-08-24 PROCEDURE — 83735 ASSAY OF MAGNESIUM: CPT | Performed by: PHYSICIAN ASSISTANT

## 2023-08-24 PROCEDURE — 93010 ELECTROCARDIOGRAM REPORT: CPT | Performed by: GENERAL ACUTE CARE HOSPITAL

## 2023-08-24 PROCEDURE — 370N000017 HC ANESTHESIA TECHNICAL FEE, PER MIN: Performed by: SURGERY

## 2023-08-24 PROCEDURE — 81025 URINE PREGNANCY TEST: CPT | Performed by: ANESTHESIOLOGY

## 2023-08-24 PROCEDURE — 02UG0JZ SUPPLEMENT MITRAL VALVE WITH SYNTHETIC SUBSTITUTE, OPEN APPROACH: ICD-10-PCS | Performed by: SURGERY

## 2023-08-24 PROCEDURE — 250N000011 HC RX IP 250 OP 636: Mod: JZ | Performed by: NURSE ANESTHETIST, CERTIFIED REGISTERED

## 2023-08-24 PROCEDURE — 250N000009 HC RX 250: Performed by: NURSE ANESTHETIST, CERTIFIED REGISTERED

## 2023-08-24 PROCEDURE — 85027 COMPLETE CBC AUTOMATED: CPT

## 2023-08-24 PROCEDURE — 999N000055 HC STATISTIC END TITIAL CO2 MONITORING

## 2023-08-24 PROCEDURE — 82803 BLOOD GASES ANY COMBINATION: CPT

## 2023-08-24 PROCEDURE — 82330 ASSAY OF CALCIUM: CPT | Performed by: PHYSICIAN ASSISTANT

## 2023-08-24 PROCEDURE — 82330 ASSAY OF CALCIUM: CPT | Performed by: SURGERY

## 2023-08-24 PROCEDURE — 250N000011 HC RX IP 250 OP 636

## 2023-08-24 PROCEDURE — 999N000009 HC STATISTIC AIRWAY CARE

## 2023-08-24 PROCEDURE — 272N000001 HC OR GENERAL SUPPLY STERILE: Performed by: SURGERY

## 2023-08-24 PROCEDURE — 410N000003 HC PER-PERFUSION 1ST 30 MIN: Performed by: SURGERY

## 2023-08-24 PROCEDURE — P9016 RBC LEUKOCYTES REDUCED: HCPCS | Performed by: SURGERY

## 2023-08-24 PROCEDURE — 85610 PROTHROMBIN TIME: CPT | Performed by: PHYSICIAN ASSISTANT

## 2023-08-24 PROCEDURE — 258N000001 HC RX 258: Performed by: SURGERY

## 2023-08-24 PROCEDURE — 250N000013 HC RX MED GY IP 250 OP 250 PS 637: Performed by: PHYSICIAN ASSISTANT

## 2023-08-24 PROCEDURE — 250N000011 HC RX IP 250 OP 636: Performed by: SURGERY

## 2023-08-24 PROCEDURE — 250N000025 HC SEVOFLURANE, PER MIN: Performed by: SURGERY

## 2023-08-24 PROCEDURE — 85730 THROMBOPLASTIN TIME PARTIAL: CPT | Performed by: NURSE ANESTHETIST, CERTIFIED REGISTERED

## 2023-08-24 PROCEDURE — 258N000003 HC RX IP 258 OP 636: Performed by: ANESTHESIOLOGY

## 2023-08-24 PROCEDURE — 999N000253 HC STATISTIC WEANING TRIALS

## 2023-08-24 PROCEDURE — 258N000003 HC RX IP 258 OP 636: Performed by: NURSE ANESTHETIST, CERTIFIED REGISTERED

## 2023-08-24 PROCEDURE — P9045 ALBUMIN (HUMAN), 5%, 250 ML: HCPCS | Mod: JZ | Performed by: NURSE ANESTHETIST, CERTIFIED REGISTERED

## 2023-08-24 PROCEDURE — 250N000011 HC RX IP 250 OP 636: Performed by: PHYSICIAN ASSISTANT

## 2023-08-24 PROCEDURE — 85610 PROTHROMBIN TIME: CPT | Performed by: NURSE ANESTHETIST, CERTIFIED REGISTERED

## 2023-08-24 PROCEDURE — C1713 ANCHOR/SCREW BN/BN,TIS/BN: HCPCS | Performed by: SURGERY

## 2023-08-24 PROCEDURE — C1889 IMPLANT/INSERT DEVICE, NOC: HCPCS | Performed by: SURGERY

## 2023-08-24 PROCEDURE — 85730 THROMBOPLASTIN TIME PARTIAL: CPT | Performed by: PHYSICIAN ASSISTANT

## 2023-08-24 PROCEDURE — 410N000004: Performed by: SURGERY

## 2023-08-24 PROCEDURE — 86923 COMPATIBILITY TEST ELECTRIC: CPT | Performed by: SURGERY

## 2023-08-24 PROCEDURE — 84100 ASSAY OF PHOSPHORUS: CPT | Performed by: PHYSICIAN ASSISTANT

## 2023-08-24 PROCEDURE — 82805 BLOOD GASES W/O2 SATURATION: CPT

## 2023-08-24 PROCEDURE — 999N000065 XR CHEST PORT 1 VIEW

## 2023-08-24 PROCEDURE — 250N000013 HC RX MED GY IP 250 OP 250 PS 637: Performed by: SURGERY

## 2023-08-24 DEVICE — SU STERNAL WIRE SINGLE #6 046-032: Type: IMPLANTABLE DEVICE | Site: CHEST | Status: FUNCTIONAL

## 2023-08-24 DEVICE — SS SUTURE, 3 PER SLEEVE
Type: IMPLANTABLE DEVICE | Site: CHEST | Status: FUNCTIONAL
Brand: MYO/WIRE II

## 2023-08-24 DEVICE — IMPLANTABLE DEVICE: Type: IMPLANTABLE DEVICE | Site: CHEST | Status: FUNCTIONAL

## 2023-08-24 RX ORDER — DEXTROSE MONOHYDRATE 25 G/50ML
25-50 INJECTION, SOLUTION INTRAVENOUS
Status: CANCELLED | OUTPATIENT
Start: 2023-08-24

## 2023-08-24 RX ORDER — NALOXONE HYDROCHLORIDE 1 MG/ML
0.2 INJECTION INTRAMUSCULAR; INTRAVENOUS; SUBCUTANEOUS
Status: DISCONTINUED | OUTPATIENT
Start: 2023-08-24 | End: 2023-08-24

## 2023-08-24 RX ORDER — CALCIUM GLUCONATE 20 MG/ML
1 INJECTION, SOLUTION INTRAVENOUS
Status: DISCONTINUED | OUTPATIENT
Start: 2023-08-24 | End: 2023-08-28 | Stop reason: HOSPADM

## 2023-08-24 RX ORDER — HYDRALAZINE HYDROCHLORIDE 20 MG/ML
10 INJECTION INTRAMUSCULAR; INTRAVENOUS EVERY 30 MIN PRN
Status: DISCONTINUED | OUTPATIENT
Start: 2023-08-24 | End: 2023-08-28 | Stop reason: HOSPADM

## 2023-08-24 RX ORDER — MAGNESIUM HYDROXIDE 1200 MG/15ML
LIQUID ORAL PRN
Status: DISCONTINUED | OUTPATIENT
Start: 2023-08-24 | End: 2023-08-24 | Stop reason: HOSPADM

## 2023-08-24 RX ORDER — ACETAMINOPHEN 325 MG/1
650 TABLET ORAL EVERY 4 HOURS PRN
Status: DISCONTINUED | OUTPATIENT
Start: 2023-09-03 | End: 2023-08-26

## 2023-08-24 RX ORDER — LIDOCAINE HYDROCHLORIDE 10 MG/ML
INJECTION, SOLUTION INFILTRATION; PERINEURAL PRN
Status: DISCONTINUED | OUTPATIENT
Start: 2023-08-24 | End: 2023-08-24

## 2023-08-24 RX ORDER — MAGNESIUM SULFATE 4 G/50ML
4 INJECTION INTRAVENOUS ONCE
Status: COMPLETED | OUTPATIENT
Start: 2023-08-24 | End: 2023-08-24

## 2023-08-24 RX ORDER — METHADONE HYDROCHLORIDE 10 MG/ML
INJECTION, SOLUTION INTRAMUSCULAR; INTRAVENOUS; SUBCUTANEOUS
Status: DISCONTINUED
Start: 2023-08-24 | End: 2023-08-24 | Stop reason: HOSPADM

## 2023-08-24 RX ORDER — LIDOCAINE 40 MG/G
CREAM TOPICAL
Status: DISCONTINUED | OUTPATIENT
Start: 2023-08-24 | End: 2023-08-24 | Stop reason: HOSPADM

## 2023-08-24 RX ORDER — ONDANSETRON 2 MG/ML
4 INJECTION INTRAMUSCULAR; INTRAVENOUS EVERY 6 HOURS PRN
Status: DISCONTINUED | OUTPATIENT
Start: 2023-08-24 | End: 2023-08-27

## 2023-08-24 RX ORDER — KETOROLAC TROMETHAMINE 30 MG/ML
30 INJECTION, SOLUTION INTRAMUSCULAR; INTRAVENOUS EVERY 6 HOURS PRN
Status: DISPENSED | OUTPATIENT
Start: 2023-08-24 | End: 2023-08-27

## 2023-08-24 RX ORDER — FENTANYL CITRATE 50 UG/ML
25-50 INJECTION, SOLUTION INTRAMUSCULAR; INTRAVENOUS
Status: DISCONTINUED | OUTPATIENT
Start: 2023-08-24 | End: 2023-08-25

## 2023-08-24 RX ORDER — FUROSEMIDE 10 MG/ML
INJECTION INTRAMUSCULAR; INTRAVENOUS PRN
Status: DISCONTINUED | OUTPATIENT
Start: 2023-08-24 | End: 2023-08-24

## 2023-08-24 RX ORDER — SODIUM CHLORIDE 9 MG/ML
INJECTION, SOLUTION INTRAVENOUS CONTINUOUS PRN
Status: DISCONTINUED | OUTPATIENT
Start: 2023-08-24 | End: 2023-08-24

## 2023-08-24 RX ORDER — CEFAZOLIN SODIUM/WATER 2 G/20 ML
2 SYRINGE (ML) INTRAVENOUS ONCE
Status: COMPLETED | OUTPATIENT
Start: 2023-08-24 | End: 2023-08-24

## 2023-08-24 RX ORDER — HEPARIN SODIUM 5000 [USP'U]/.5ML
5000 INJECTION, SOLUTION INTRAVENOUS; SUBCUTANEOUS EVERY 8 HOURS
Status: DISCONTINUED | OUTPATIENT
Start: 2023-08-25 | End: 2023-08-28 | Stop reason: HOSPADM

## 2023-08-24 RX ORDER — SODIUM CHLORIDE, SODIUM LACTATE, POTASSIUM CHLORIDE, CALCIUM CHLORIDE 600; 310; 30; 20 MG/100ML; MG/100ML; MG/100ML; MG/100ML
INJECTION, SOLUTION INTRAVENOUS CONTINUOUS
Status: DISCONTINUED | OUTPATIENT
Start: 2023-08-24 | End: 2023-08-24 | Stop reason: HOSPADM

## 2023-08-24 RX ORDER — METHADONE HYDROCHLORIDE 10 MG/ML
20 INJECTION, SOLUTION INTRAMUSCULAR; INTRAVENOUS; SUBCUTANEOUS ONCE
Status: COMPLETED | OUTPATIENT
Start: 2023-08-24 | End: 2023-08-24

## 2023-08-24 RX ORDER — OXYCODONE HYDROCHLORIDE 5 MG/1
10 TABLET ORAL EVERY 4 HOURS PRN
Status: DISCONTINUED | OUTPATIENT
Start: 2023-08-24 | End: 2023-08-28 | Stop reason: HOSPADM

## 2023-08-24 RX ORDER — GLYCOPYRROLATE 0.2 MG/ML
INJECTION, SOLUTION INTRAMUSCULAR; INTRAVENOUS PRN
Status: DISCONTINUED | OUTPATIENT
Start: 2023-08-24 | End: 2023-08-24

## 2023-08-24 RX ORDER — HYDROMORPHONE HCL IN WATER/PF 6 MG/30 ML
0.4 PATIENT CONTROLLED ANALGESIA SYRINGE INTRAVENOUS
Status: DISCONTINUED | OUTPATIENT
Start: 2023-08-24 | End: 2023-08-26

## 2023-08-24 RX ORDER — PROTAMINE SULFATE 10 MG/ML
INJECTION, SOLUTION INTRAVENOUS PRN
Status: DISCONTINUED | OUTPATIENT
Start: 2023-08-24 | End: 2023-08-24

## 2023-08-24 RX ORDER — KETAMINE HYDROCHLORIDE 10 MG/ML
INJECTION INTRAMUSCULAR; INTRAVENOUS PRN
Status: DISCONTINUED | OUTPATIENT
Start: 2023-08-24 | End: 2023-08-24

## 2023-08-24 RX ORDER — KETOROLAC TROMETHAMINE 30 MG/ML
INJECTION, SOLUTION INTRAMUSCULAR; INTRAVENOUS
Status: COMPLETED
Start: 2023-08-24 | End: 2023-08-24

## 2023-08-24 RX ORDER — ACETAMINOPHEN 325 MG/1
975 TABLET ORAL EVERY 8 HOURS
Status: DISCONTINUED | OUTPATIENT
Start: 2023-08-24 | End: 2023-08-26

## 2023-08-24 RX ORDER — VANCOMYCIN HYDROCHLORIDE 1 G/200ML
1000 INJECTION, SOLUTION INTRAVENOUS
Status: COMPLETED | OUTPATIENT
Start: 2023-08-24 | End: 2023-08-24

## 2023-08-24 RX ORDER — PROCHLORPERAZINE MALEATE 10 MG
10 TABLET ORAL EVERY 6 HOURS PRN
Status: DISCONTINUED | OUTPATIENT
Start: 2023-08-24 | End: 2023-08-25

## 2023-08-24 RX ORDER — NALOXONE HYDROCHLORIDE 0.4 MG/ML
0.2 INJECTION, SOLUTION INTRAMUSCULAR; INTRAVENOUS; SUBCUTANEOUS
Status: DISCONTINUED | OUTPATIENT
Start: 2023-08-24 | End: 2023-08-28 | Stop reason: HOSPADM

## 2023-08-24 RX ORDER — CEFAZOLIN SODIUM/WATER 2 G/20 ML
2 SYRINGE (ML) INTRAVENOUS SEE ADMIN INSTRUCTIONS
Status: DISCONTINUED | OUTPATIENT
Start: 2023-08-24 | End: 2023-08-24

## 2023-08-24 RX ORDER — SODIUM CHLORIDE, SODIUM GLUCONATE, SODIUM ACETATE, POTASSIUM CHLORIDE AND MAGNESIUM CHLORIDE 526; 502; 368; 37; 30 MG/100ML; MG/100ML; MG/100ML; MG/100ML; MG/100ML
1000 INJECTION, SOLUTION INTRAVENOUS
Status: DISCONTINUED | OUTPATIENT
Start: 2023-08-24 | End: 2023-08-24

## 2023-08-24 RX ORDER — CHLORHEXIDINE GLUCONATE ORAL RINSE 1.2 MG/ML
10 SOLUTION DENTAL ONCE
Status: COMPLETED | OUTPATIENT
Start: 2023-08-24 | End: 2023-08-24

## 2023-08-24 RX ORDER — PANTOPRAZOLE SODIUM 40 MG/1
40 TABLET, DELAYED RELEASE ORAL DAILY
Status: DISCONTINUED | OUTPATIENT
Start: 2023-08-25 | End: 2023-08-28 | Stop reason: HOSPADM

## 2023-08-24 RX ORDER — FENTANYL CITRATE 50 UG/ML
INJECTION, SOLUTION INTRAMUSCULAR; INTRAVENOUS PRN
Status: DISCONTINUED | OUTPATIENT
Start: 2023-08-24 | End: 2023-08-24

## 2023-08-24 RX ORDER — VANCOMYCIN HYDROCHLORIDE 1 G/20ML
INJECTION, POWDER, LYOPHILIZED, FOR SOLUTION INTRAVENOUS PRN
Status: DISCONTINUED | OUTPATIENT
Start: 2023-08-24 | End: 2023-08-24 | Stop reason: HOSPADM

## 2023-08-24 RX ORDER — NALOXONE HYDROCHLORIDE 0.4 MG/ML
0.4 INJECTION, SOLUTION INTRAMUSCULAR; INTRAVENOUS; SUBCUTANEOUS
Status: DISCONTINUED | OUTPATIENT
Start: 2023-08-24 | End: 2023-08-28 | Stop reason: HOSPADM

## 2023-08-24 RX ORDER — PHENYLEPHRINE HCL IN 0.9% NACL 50MG/250ML
.1-4 PLASTIC BAG, INJECTION (ML) INTRAVENOUS CONTINUOUS PRN
Status: DISCONTINUED | OUTPATIENT
Start: 2023-08-24 | End: 2023-08-25

## 2023-08-24 RX ORDER — ONDANSETRON 4 MG/1
4 TABLET, ORALLY DISINTEGRATING ORAL EVERY 6 HOURS PRN
Status: DISCONTINUED | OUTPATIENT
Start: 2023-08-24 | End: 2023-08-27

## 2023-08-24 RX ORDER — CEFAZOLIN SODIUM 1 G/3ML
1 INJECTION, POWDER, FOR SOLUTION INTRAMUSCULAR; INTRAVENOUS EVERY 8 HOURS
Status: COMPLETED | OUTPATIENT
Start: 2023-08-24 | End: 2023-08-25

## 2023-08-24 RX ORDER — NALOXONE HYDROCHLORIDE 1 MG/ML
0.4 INJECTION INTRAMUSCULAR; INTRAVENOUS; SUBCUTANEOUS
Status: DISCONTINUED | OUTPATIENT
Start: 2023-08-24 | End: 2023-08-24

## 2023-08-24 RX ORDER — HEPARIN SODIUM 1000 [USP'U]/ML
INJECTION, SOLUTION INTRAVENOUS; SUBCUTANEOUS PRN
Status: DISCONTINUED | OUTPATIENT
Start: 2023-08-24 | End: 2023-08-24

## 2023-08-24 RX ORDER — OXYCODONE HYDROCHLORIDE 5 MG/1
5 TABLET ORAL EVERY 4 HOURS PRN
Status: DISCONTINUED | OUTPATIENT
Start: 2023-08-24 | End: 2023-08-28 | Stop reason: HOSPADM

## 2023-08-24 RX ORDER — VANCOMYCIN HYDROCHLORIDE 1 G/200ML
1000 INJECTION, SOLUTION INTRAVENOUS EVERY 12 HOURS
Status: COMPLETED | OUTPATIENT
Start: 2023-08-24 | End: 2023-08-25

## 2023-08-24 RX ORDER — NICOTINE POLACRILEX 4 MG
15-30 LOZENGE BUCCAL
Status: DISCONTINUED | OUTPATIENT
Start: 2023-08-24 | End: 2023-08-28 | Stop reason: HOSPADM

## 2023-08-24 RX ORDER — DEXMEDETOMIDINE HYDROCHLORIDE 4 UG/ML
.2-.7 INJECTION, SOLUTION INTRAVENOUS CONTINUOUS
Status: DISCONTINUED | OUTPATIENT
Start: 2023-08-24 | End: 2023-08-25

## 2023-08-24 RX ORDER — AMOXICILLIN 250 MG
1 CAPSULE ORAL 2 TIMES DAILY
Status: DISCONTINUED | OUTPATIENT
Start: 2023-08-24 | End: 2023-08-28 | Stop reason: HOSPADM

## 2023-08-24 RX ORDER — EPHEDRINE SULFATE 50 MG/ML
INJECTION, SOLUTION INTRAMUSCULAR; INTRAVENOUS; SUBCUTANEOUS PRN
Status: DISCONTINUED | OUTPATIENT
Start: 2023-08-24 | End: 2023-08-24

## 2023-08-24 RX ORDER — DEXTROSE MONOHYDRATE 25 G/50ML
25-50 INJECTION, SOLUTION INTRAVENOUS
Status: DISCONTINUED | OUTPATIENT
Start: 2023-08-24 | End: 2023-08-28 | Stop reason: HOSPADM

## 2023-08-24 RX ORDER — PHENYLEPHRINE HCL IN 0.9% NACL 50MG/250ML
.1-6 PLASTIC BAG, INJECTION (ML) INTRAVENOUS CONTINUOUS
Status: DISCONTINUED | OUTPATIENT
Start: 2023-08-24 | End: 2023-08-24 | Stop reason: HOSPADM

## 2023-08-24 RX ORDER — HYDROMORPHONE HCL IN WATER/PF 6 MG/30 ML
0.2 PATIENT CONTROLLED ANALGESIA SYRINGE INTRAVENOUS
Status: DISCONTINUED | OUTPATIENT
Start: 2023-08-24 | End: 2023-08-26

## 2023-08-24 RX ORDER — ASPIRIN 81 MG/1
162 TABLET, CHEWABLE ORAL DAILY
Status: DISCONTINUED | OUTPATIENT
Start: 2023-08-24 | End: 2023-08-25

## 2023-08-24 RX ORDER — CALCIUM GLUCONATE 20 MG/ML
2 INJECTION, SOLUTION INTRAVENOUS
Status: DISCONTINUED | OUTPATIENT
Start: 2023-08-24 | End: 2023-08-28 | Stop reason: HOSPADM

## 2023-08-24 RX ORDER — NICOTINE POLACRILEX 4 MG
15-30 LOZENGE BUCCAL
Status: CANCELLED | OUTPATIENT
Start: 2023-08-24

## 2023-08-24 RX ORDER — CHLORHEXIDINE GLUCONATE ORAL RINSE 1.2 MG/ML
15 SOLUTION DENTAL EVERY 12 HOURS
Status: DISCONTINUED | OUTPATIENT
Start: 2023-08-24 | End: 2023-08-24

## 2023-08-24 RX ORDER — BISACODYL 10 MG
10 SUPPOSITORY, RECTAL RECTAL DAILY PRN
Status: DISCONTINUED | OUTPATIENT
Start: 2023-08-24 | End: 2023-08-28 | Stop reason: HOSPADM

## 2023-08-24 RX ORDER — DEXMEDETOMIDINE HYDROCHLORIDE 4 UG/ML
.2-1.2 INJECTION, SOLUTION INTRAVENOUS CONTINUOUS
Status: DISCONTINUED | OUTPATIENT
Start: 2023-08-24 | End: 2023-08-24 | Stop reason: HOSPADM

## 2023-08-24 RX ORDER — POLYETHYLENE GLYCOL 3350 17 G/17G
17 POWDER, FOR SOLUTION ORAL DAILY
Status: DISCONTINUED | OUTPATIENT
Start: 2023-08-25 | End: 2023-08-28 | Stop reason: HOSPADM

## 2023-08-24 RX ORDER — PROPOFOL 10 MG/ML
INJECTION, EMULSION INTRAVENOUS PRN
Status: DISCONTINUED | OUTPATIENT
Start: 2023-08-24 | End: 2023-08-24

## 2023-08-24 RX ORDER — NORGESTIMATE AND ETHINYL ESTRADIOL 7DAYSX3 28
1 KIT ORAL EVERY EVENING
Status: DISCONTINUED | OUTPATIENT
Start: 2023-08-25 | End: 2023-08-28 | Stop reason: HOSPADM

## 2023-08-24 RX ORDER — NEOSTIGMINE METHYLSULFATE 1 MG/ML
VIAL (ML) INJECTION PRN
Status: DISCONTINUED | OUTPATIENT
Start: 2023-08-24 | End: 2023-08-24

## 2023-08-24 RX ORDER — ONDANSETRON 2 MG/ML
INJECTION INTRAMUSCULAR; INTRAVENOUS PRN
Status: DISCONTINUED | OUTPATIENT
Start: 2023-08-24 | End: 2023-08-24

## 2023-08-24 RX ADMIN — ROCURONIUM BROMIDE 50 MG: 50 INJECTION, SOLUTION INTRAVENOUS at 09:56

## 2023-08-24 RX ADMIN — Medication 0.3 MCG/KG/MIN: at 11:30

## 2023-08-24 RX ADMIN — PROTAMINE SULFATE 200 MG: 10 INJECTION, SOLUTION INTRAVENOUS at 11:21

## 2023-08-24 RX ADMIN — ONDANSETRON 4 MG: 2 INJECTION INTRAMUSCULAR; INTRAVENOUS at 11:40

## 2023-08-24 RX ADMIN — DESMOPRESSIN ACETATE 23.56 MCG: 4 INJECTION, SOLUTION INTRAVENOUS; SUBCUTANEOUS at 11:24

## 2023-08-24 RX ADMIN — SODIUM CHLORIDE, POTASSIUM CHLORIDE, SODIUM LACTATE AND CALCIUM CHLORIDE 1000 ML: 600; 310; 30; 20 INJECTION, SOLUTION INTRAVENOUS at 13:59

## 2023-08-24 RX ADMIN — GLYCOPYRROLATE 0.8 MG: 0.2 INJECTION INTRAMUSCULAR; INTRAVENOUS at 12:23

## 2023-08-24 RX ADMIN — HYDROMORPHONE HYDROCHLORIDE 0.2 MG: 0.2 INJECTION, SOLUTION INTRAMUSCULAR; INTRAVENOUS; SUBCUTANEOUS at 12:50

## 2023-08-24 RX ADMIN — Medication 10 MG: at 08:28

## 2023-08-24 RX ADMIN — ALBUMIN HUMAN: 0.05 INJECTION, SOLUTION INTRAVENOUS at 12:08

## 2023-08-24 RX ADMIN — FENTANYL CITRATE 50 MCG: 50 INJECTION, SOLUTION INTRAMUSCULAR; INTRAVENOUS at 08:34

## 2023-08-24 RX ADMIN — HEPARIN SODIUM 25000 UNITS: 1000 INJECTION INTRAVENOUS; SUBCUTANEOUS at 08:43

## 2023-08-24 RX ADMIN — NEOSTIGMINE METHYLSULFATE 4 MG: 1 INJECTION INTRAVENOUS at 12:23

## 2023-08-24 RX ADMIN — ALBUMIN HUMAN: 0.05 INJECTION, SOLUTION INTRAVENOUS at 11:27

## 2023-08-24 RX ADMIN — MAGNESIUM SULFATE HEPTAHYDRATE 4 G: 80 INJECTION, SOLUTION INTRAVENOUS at 06:55

## 2023-08-24 RX ADMIN — ONDANSETRON 4 MG: 2 INJECTION INTRAMUSCULAR; INTRAVENOUS at 19:35

## 2023-08-24 RX ADMIN — KETOROLAC TROMETHAMINE 30 MG: 30 INJECTION INTRAMUSCULAR; INTRAVENOUS at 13:37

## 2023-08-24 RX ADMIN — HYDROMORPHONE HYDROCHLORIDE 0.2 MG: 0.2 INJECTION, SOLUTION INTRAMUSCULAR; INTRAVENOUS; SUBCUTANEOUS at 23:16

## 2023-08-24 RX ADMIN — CALCIUM GLUCONATE 1 G: 20 INJECTION, SOLUTION INTRAVENOUS at 18:47

## 2023-08-24 RX ADMIN — ASPIRIN 81 MG CHEWABLE TABLET 162 MG: 81 TABLET CHEWABLE at 17:46

## 2023-08-24 RX ADMIN — HYDROMORPHONE HYDROCHLORIDE 0.2 MG: 0.2 INJECTION, SOLUTION INTRAMUSCULAR; INTRAVENOUS; SUBCUTANEOUS at 17:41

## 2023-08-24 RX ADMIN — SODIUM CHLORIDE: 9 INJECTION, SOLUTION INTRAVENOUS at 08:05

## 2023-08-24 RX ADMIN — FENTANYL CITRATE 50 MCG: 50 INJECTION, SOLUTION INTRAMUSCULAR; INTRAVENOUS at 07:23

## 2023-08-24 RX ADMIN — METOPROLOL TARTRATE 12.5 MG: 25 TABLET, FILM COATED ORAL at 06:44

## 2023-08-24 RX ADMIN — KETOROLAC TROMETHAMINE 30 MG: 30 INJECTION, SOLUTION INTRAMUSCULAR; INTRAVENOUS at 13:37

## 2023-08-24 RX ADMIN — SODIUM CHLORIDE, POTASSIUM CHLORIDE, SODIUM LACTATE AND CALCIUM CHLORIDE 250 ML: 600; 310; 30; 20 INJECTION, SOLUTION INTRAVENOUS at 23:16

## 2023-08-24 RX ADMIN — KETOROLAC TROMETHAMINE 30 MG: 30 INJECTION, SOLUTION INTRAMUSCULAR; INTRAVENOUS at 19:20

## 2023-08-24 RX ADMIN — LIDOCAINE HYDROCHLORIDE 5 ML: 10 INJECTION, SOLUTION INFILTRATION; PERINEURAL at 07:38

## 2023-08-24 RX ADMIN — CHLORHEXIDINE GLUCONATE 10 ML: 1.2 SOLUTION ORAL at 06:46

## 2023-08-24 RX ADMIN — DEXMEDETOMIDINE HYDROCHLORIDE 0.5 MCG/KG/HR: 400 INJECTION INTRAVENOUS at 08:05

## 2023-08-24 RX ADMIN — Medication 10 MG: at 08:05

## 2023-08-24 RX ADMIN — INSULIN HUMAN 3 UNITS/HR: 1 INJECTION, SOLUTION INTRAVENOUS at 10:07

## 2023-08-24 RX ADMIN — FUROSEMIDE 5 MG: 10 INJECTION, SOLUTION INTRAMUSCULAR; INTRAVENOUS at 09:02

## 2023-08-24 RX ADMIN — PHENYLEPHRINE HYDROCHLORIDE 50 MCG: 10 INJECTION INTRAVENOUS at 11:30

## 2023-08-24 RX ADMIN — MIDAZOLAM 2 MG: 1 INJECTION INTRAMUSCULAR; INTRAVENOUS at 07:18

## 2023-08-24 RX ADMIN — OXYCODONE HYDROCHLORIDE 10 MG: 5 TABLET ORAL at 19:21

## 2023-08-24 RX ADMIN — ACETAMINOPHEN 975 MG: 325 TABLET ORAL at 22:15

## 2023-08-24 RX ADMIN — PROCHLORPERAZINE EDISYLATE 10 MG: 5 INJECTION, SOLUTION INTRAMUSCULAR; INTRAVENOUS at 22:56

## 2023-08-24 RX ADMIN — SODIUM CHLORIDE, POTASSIUM CHLORIDE, SODIUM LACTATE AND CALCIUM CHLORIDE 1000 ML: 600; 310; 30; 20 INJECTION, SOLUTION INTRAVENOUS at 16:13

## 2023-08-24 RX ADMIN — ROCURONIUM BROMIDE 40 MG: 50 INJECTION, SOLUTION INTRAVENOUS at 08:26

## 2023-08-24 RX ADMIN — KETAMINE HYDROCHLORIDE 50 MG: 10 INJECTION INTRAMUSCULAR; INTRAVENOUS at 07:38

## 2023-08-24 RX ADMIN — CALCIUM GLUCONATE 1 G: 20 INJECTION, SOLUTION INTRAVENOUS at 13:06

## 2023-08-24 RX ADMIN — SODIUM CHLORIDE, POTASSIUM CHLORIDE, SODIUM LACTATE AND CALCIUM CHLORIDE: 600; 310; 30; 20 INJECTION, SOLUTION INTRAVENOUS at 12:09

## 2023-08-24 RX ADMIN — CEFAZOLIN 1 G: 1 INJECTION, POWDER, FOR SOLUTION INTRAMUSCULAR; INTRAVENOUS at 16:43

## 2023-08-24 RX ADMIN — VANCOMYCIN HYDROCHLORIDE 1000 MG: 1 INJECTION, SOLUTION INTRAVENOUS at 17:42

## 2023-08-24 RX ADMIN — SODIUM CHLORIDE 7.5 G: 900 INJECTION, SOLUTION INTRAVENOUS at 08:05

## 2023-08-24 RX ADMIN — PROPOFOL 200 MG: 10 INJECTION, EMULSION INTRAVENOUS at 07:38

## 2023-08-24 RX ADMIN — Medication 5 MG: at 08:20

## 2023-08-24 RX ADMIN — ROCURONIUM BROMIDE 60 MG: 50 INJECTION, SOLUTION INTRAVENOUS at 07:38

## 2023-08-24 RX ADMIN — SENNOSIDES AND DOCUSATE SODIUM 1 TABLET: 50; 8.6 TABLET ORAL at 20:02

## 2023-08-24 RX ADMIN — AMINOCAPROIC ACID 1 G/HR: 250 INJECTION, SOLUTION INTRAVENOUS at 09:05

## 2023-08-24 RX ADMIN — SODIUM CHLORIDE: 9 INJECTION, SOLUTION INTRAVENOUS at 12:09

## 2023-08-24 RX ADMIN — SODIUM CHLORIDE, POTASSIUM CHLORIDE, SODIUM LACTATE AND CALCIUM CHLORIDE: 600; 310; 30; 20 INJECTION, SOLUTION INTRAVENOUS at 07:00

## 2023-08-24 RX ADMIN — Medication 2 G: at 08:00

## 2023-08-24 RX ADMIN — VANCOMYCIN HYDROCHLORIDE 1000 MG: 1 INJECTION, SOLUTION INTRAVENOUS at 06:53

## 2023-08-24 ASSESSMENT — ACTIVITIES OF DAILY LIVING (ADL)
DRESSING/BATHING_DIFFICULTY: NO
ADLS_ACUITY_SCORE: 35
ADLS_ACUITY_SCORE: 22
ADLS_ACUITY_SCORE: 24
TOILETING_ISSUES: NO
ADLS_ACUITY_SCORE: 24
FALL_HISTORY_WITHIN_LAST_SIX_MONTHS: NO
ADLS_ACUITY_SCORE: 24
WEAR_GLASSES_OR_BLIND: YES
WALKING_OR_CLIMBING_STAIRS_DIFFICULTY: NO
VISION_MANAGEMENT: GLASSES
CONCENTRATING,_REMEMBERING_OR_MAKING_DECISIONS_DIFFICULTY: NO
ADLS_ACUITY_SCORE: 22
CHANGE_IN_FUNCTIONAL_STATUS_SINCE_ONSET_OF_CURRENT_ILLNESS/INJURY: NO
DIFFICULTY_COMMUNICATING: NO
HEARING_DIFFICULTY_OR_DEAF: NO
ADLS_ACUITY_SCORE: 22
DIFFICULTY_EATING/SWALLOWING: NO
DOING_ERRANDS_INDEPENDENTLY_DIFFICULTY: NO

## 2023-08-24 NOTE — BRIEF OP NOTE
[facility]    Brief Operative Note    Pre-operative diagnosis: Mitral valve prolapse [I34.1]  Horn syndrome [Q96.9]  Mitral regurgitation [I34.0]  Post-operative diagnosis Same as pre-operative diagnosis    Procedure: Procedure(s):  Mitral valve repair, left atrial appendage ligation with ANESTHESIA TRANSESOPHAGEAL ECHOCARDIOGRAM  Surgeon: Surgeon(s) and Role:     * Jeff Magdaleno MD - Primary     * Jeevan Shaffer MD - Assisting  Anesthesia: General   Estimated Blood Loss: 350ml    Drains: 1x mediastinal CT, 1x eft pleural CT  Specimens:   ID Type Source Tests Collected by Time Destination   1 : LEFT ATRIAL APPENDAGE Tissue Heart SURGICAL PATHOLOGY EXAM Jeff Magdaleno MD 8/24/2023  9:16 AM    2 : P2 LEAFLET Tissue Heart SURGICAL PATHOLOGY EXAM Jeff Magdaleno MD 8/24/2023  9:46 AM      Findings:   Mitral Valve with thickened, redundant anterior and posterior leaflets.   Mitral repair with 38mm annuloplasty ring, P2 quadrangular resection and giovani-chords on anterior leaflet    Complications: None.  Implants:   Implant Name Type Inv. Item Serial No.  Lot No. LRB No. Used Action   MCCLAIN STERNAL WIRE SINGLE #6 046-032 - [id number] Wire MCCLAIN STERNAL WIRE SINGLE #6 046-032  [employer] [id number] N/A 1 Implanted   ANNULOPLASTY RING LOGAN FUTURE 38MM [id number] - [id number] Annuloplasty Ring ANNULOPLASTY RING LOGAN FUTURE 38MM [id number] [employer] NA N/A 1 Implanted   MCCLAIN MYOSTERNAL WIRE 046-267 - [id number] Wire MCCLAIN MYOSTERNAL WIRE 046-267  [employer] [id number] N/A 1 Implanted

## 2023-08-24 NOTE — ANESTHESIA PROCEDURE NOTES
Central Line/PA Catheter Placement    Pre-Procedure   Staff -        Anesthesiologist:  Anat Rivera MD       Performed By: anesthesiologist       Location: OR       Pre-Anesthestic Checklist: patient identified, IV checked, site marked, risks and benefits discussed, informed consent, monitors and equipment checked, pre-op evaluation and at physician/surgeon's request  Timeout:       Correct Patient: Yes        Correct Procedure: Yes        Correct Site: Yes        Correct Position: Yes        Correct Laterality: Yes   Line Placement:   This line was placed Post Induction starting at 8/24/2023 7:47 AM and ending at 8/24/2023 8:02 AM    Procedure   Procedure: central line       Laterality: right       Insertion Site: internal jugular.       Patient Position: supine  Sterile Prep        All elements of maximal sterile barrier technique followed       Patient Prep/Sterile Barriers: draped, hand hygiene, gloves , hat , mask , draped, gown, sterile gel and probe cover       Skin prep: Chloraprep  Insertion/Injection        Technique: ultrasound guided and Seldinger Technique        1. Ultrasound was used to evaluate the access site.       2. Vein evaluated via ultrasound for patency/adequacy.       3. Using real-time ultrasound the needle/catheter was observed entering the artery/vein.       4. Permanent image was captured and entered into the patient's record.       Introducer Type: 9 Fr, 2-lumen MAC        Type: PA/CVC with Introducer       PA Catheter Type: CCO         Appropriate RV, RA and PA waveforms noted:  Yes            Balloon down at end of the procedure:   Narrative         Secured by: suture       Tegaderm and Biopatch dressing used.       Complications: None apparent,        blood aspirated from all lumens,        Verification method: Ultrasound

## 2023-08-24 NOTE — ANESTHESIA PROCEDURE NOTES
Perioperative EMETERIO Procedure Note    Staff -        Anesthesiologist:  Anat Rivera MD       Performed By: anesthesiologist  Preanesthesia Checklist:  Patient identified, IV assessed, risks and benefits discussed, monitors and equipment assessed, procedure being performed at surgeon's request and anesthesia consent obtained.    EMETERIO Probe Insertion    Probe Status PRE Insertion: NO obvious damage  Probe type:  Adult 3D  Bite block used:   Soft  Insertion Technique: Easy, no oropharyngeal manipulation  Insertion complications: None obvious  Billing Report:A EMETERIO report is NOT being generated.  Probe Status POST Removal: NO obvious damage

## 2023-08-24 NOTE — CONSULTS
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 Underwent MV repair today with Dr. Magdaleno.         ASSESSMENT & PLAN BY SYSTEM   Systems to Assess:   Pulmonary: Post op vent management; no underlying lung disease documented.   Wean supplemental O2 as tolerated; goal O2 sat > 92%.  HOB > 30 degrees to limit aspiration risk.    Check ABG and adjust support as indicated; avoid acidotic state.   Check CXR  Once hemodynamically stable, plan to proceed to wake, wean, and extubate with goal < 6-hours.  Goal extubation by 1830   Cardiovascular: Severe mitral regurgitation secondary to myxomatous mitral degeneration with bileaflet prolapse. S/P Mitral Valve repair and left atrial appendage ligation.    Cardiac monitoring.   SBP goal > 90 mmHg, MAP goal > 65 mmHg.    Goal CI 2-3   Goal CVP low teens  Vasoactive gtts per CV-surgery. No epi use without discussing with Dr. Magdaleno; high risk of developing ALISSON.   Temporary pacing wires present; will use in setting of symptomatic arrhythmia.   Currently paced @ back up   Underlying rhythm: sinus  Neurological: sedation for vent synchrony and comfort.    Neuro checks per ICU protocol.   Sedate with precedex until ready to wean and extubate.   Pain control:  PRN  Goal RASS 0 to -1   GI/: no acute issues   NPO until extubated and fully awake.   Robbins catheter in place for accurate measurement of I/O.   GI prophylaxis:  Omeprazole  Renal: no acute issues    Monitor I/O's.  Electrolyte repletion PRN.  Avoid/limit nephrotoxic agents.   IVFs: per CV-surgery  Heme/Coag: Acute blood loss anemia; coagulopathy secondary to pump run.   Monitor H/H.   Transfuse per CV-surgery.   Monitor chest tube output hourly; notify CV-surgery for excessive output or abrupt stop in output.   DVT prophylaxis:  SubQ heparin  Infectious disease:   General precautions.   Remove lines and drains once no longer required.   Endocrine: hyperglycemia   RHI gtt per ICU protocol.   Musculoskeletal:   Bedrest; initiate mobility protocol.     Lines: A-line, Day# 1, Central line, Day# 1 or [place], Day# 1      Code Status:  FULL CODE    Thank you for this consultation.  Please call if any questions or concerns.        This patient is considered critically ill and requires ICU level of care due to immediate post CV-surgical procedure. High risk for acute hemodynamic collapse and death.     Total Critical Care time, not including separate billable procedure time:  35 minutes.    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 Underwent MV repair with Dr. Magdaleno today. Case reported as straight forward. Echo showed normal LV function. Received 20mg IV Methadone. No difficulty with line placement or intubation. No difficulty going on or coming off pump.  EBL reported at 500mL; received 500mL Cell Saver blood and a dose of DDAVP. No bleeding concerns at end of case. Arrived to ICU on small dose of phenylephrine. Concern for developing ALISSON (none reported), so Epi is not to be  used. Sedated and on full vent support.   Direct sign out received from Sharla and Dr. Rivera.          Review of Systems   Review of systems not obtained due to patient factors.     Active Problem List   Patient Active Problem List    Diagnosis Date Noted    Mitral regurgitation 08/24/2023     Priority: Medium    Mitral valve prolapse 10/20/2022     Priority: Medium     Interpretation Summary -ECHO done 9/8/22      Left ventricular size is mildly dilated. Left ventricular systolic function is   normal. Left ventricular ejection fraction estimated 60 to 65% without wall   motion abnormality.   Normal right ventricular size and systolic function.   Moderate left atrial enlargement.   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 2 jets are   identified 1 directed anteriorly and 1 directed posteriorly with estimated   degree of mitral regurgitation to be moderately severe.   Trace to mild tricuspid insufficiency. Estimate of RV systolic pressure 29   mmHg plus right atrial pressure.   Consider transesophageal echocardiogram to better define the degree of mitral   regurgitation.       Major depression in complete remission (H) 01/14/2022     Priority: Medium    Counseling for birth control, oral contraceptives 04/13/2021     Priority: Medium    Acquired hypothyroidism 11/14/2018     Priority: Medium     Referred to endocrinology 11/14/2018        Class 1 obesity due to excess calories with serious comorbidity and body mass index (BMI) of 31.0 to 31.9 in adult 11/14/2018     Priority: Medium    Severe mitral insufficiency 11/14/2018     Priority: Medium    Anxiety 12/05/2017     Priority: Medium     Was previously taking Zoloft, now not on any medication      Formatting of this note might be different from the original.  Was previously taking Zoloft, now not on any medication      Horn Syndrome      Priority: Medium     Created by Conversion             Medical/Surgical History   Past Medical History:    Diagnosis Date    Anxiety     previous on zoloft    Disease of thyroid gland     hypothyroxine    Gonadal dysgenesis     Created by Conversion     Heart disease     Urinary tract infection     non recurrent     Past Surgical History:   Procedure Laterality Date    WISDOM TOOTH EXTRACTION           Allergies   Allergies   Allergen Reactions    Amoxicillin Hives    Penicillins Hives         Medications:  OUTpatient medications   Prior to Admission medications    Medication Sig Start Date End Date Taking? Authorizing Provider   ibuprofen (ADVIL/MOTRIN) 200 MG tablet Take 400 mg by mouth every 12 hours as needed for pain   Yes Unknown, Entered By History   norgestim-eth estrad triphasic (ORTHO TRI-CYCLEN) 0.18/0.215/0.25 MG-35 MCG tablet Take 1 tablet by mouth daily 1/14/22  Yes Chito Coppola MD           Medications:  INpatient medications    acetaminophen  975 mg Oral Q8H    aspirin  162 mg Oral or NG Tube Daily    ceFAZolin  1 g Intravenous Q8H    chlorhexidine  15 mL Mouth/Throat Q12H    [START ON 8/25/2023] heparin ANTICOAGULANT  5,000 Units Subcutaneous Q8H    [START ON 8/25/2023] norgestim-eth estrad triphasic  1 tablet Oral QPM    [START ON 8/25/2023] pantoprazole  40 mg Oral or NG Tube Daily    Or    [START ON 8/25/2023] pantoprazole  40 mg Oral Daily    [START ON 8/25/2023] polyethylene glycol  17 g Oral Daily    senna-docusate  1 tablet Oral BID    vancomycin  1,000 mg Intravenous Q12H           Family History  Social History   Reviewed  Family History   Problem Relation Age of Onset    Hypertension Mother     Coronary Artery Disease Father     Myocardial Infarction Father 61    Ovarian cysts Sister     Hyperlipidemia Brother     No Known Problems Maternal Grandmother     Coronary Artery Disease Maternal Grandfather     Myocardial Infarction Maternal Grandfather     Parkinsonism Paternal Grandmother     Suicidality Paternal Grandfather      Reviewed  Social History     Socioeconomic History     Marital status: Single     Spouse name: Not on file    Number of children: Not on file    Years of education: Not on file    Highest education level: Not on file   Occupational History    Not on file   Tobacco Use    Smoking status: Former     Types: Cigarettes    Smokeless tobacco: Never   Vaping Use    Vaping Use: Every day    Substances: Nicotine    Devices: Disposable   Substance and Sexual Activity    Alcohol use: Not Currently     Comment: very rare    Drug use: Yes     Types: Marijuana     Comment: edibles and smoking occassional    Sexual activity: Yes     Partners: Male     Birth control/protection: OCP, Condom   Other Topics Concern    Not on file   Social History Narrative    Lives in apartment with roommate.   Works .  Single.       Social Determinants of Health     Financial Resource Strain: Not on file   Food Insecurity: Not on file   Transportation Needs: Not on file   Physical Activity: Not on file   Stress: Not on file   Social Connections: Not on file   Intimate Partner Violence: Not on file   Housing Stability: Not on file      Psychosocial Needs  Social History     Social History Narrative    Lives in apartment with roommate.   Works .  Single.       Additional psychosocial needs reviewed per nursing assessment.      Physical Exam   VITALS:  BP (!) 162/76 (BP Location: Left arm)   Pulse 107   Temp 97.8  F (36.6  C) (Oral)   Resp 20   LMP 06/19/2023   SpO2 100%   [unfilled]    PHYSICAL EXAM:   GEN: intubated and sedated  HEENT:  OETT in place; AT/NC.  NECK: Supple.  Short neck. RIJ introducer   PULM:  unlabored on full vent. Lungs are clear and equal.   CVS:   RRR S1S2 no murmur. Chest tubes in place.   ABDOMEN:   soft ntnd  EXTREMITIES/SKIN:    visible skin intact. Multiple tattoos. Sternotomy clean.   NEURO:  .  sedated    I&O:    Intake/Output Summary (Last 24 hours) at 8/24/2023 1242  Last data filed at 8/24/2023 1226  Gross per 24 hour   Intake 2999.98 ml   Output  1400 ml   Net 1599.98 ml        Pertinent Labs   Lab Results: personally reviewed.      CBC:  Recent Labs   Lab 08/24/23  1202 08/24/23  1134 08/24/23  1047   WBC 9.3  --   --    HGB 8.6* 9.0* 9.2*   HCT 25.8*  --   --    *  --   --      Chemistry:  Recent Labs   Lab 08/24/23  1134 08/24/23  1047 08/24/23  0959    140 142     Coags:  Lab Results   Component Value Date    INR 1.53 (H) 08/24/2023    INR 0.88 08/16/2023        Microbiology:  None this admission        Radiology:  Chest X-Ray: post op film pending

## 2023-08-24 NOTE — PHARMACY-ADMISSION MEDICATION HISTORY
Pharmacist Admission Medication History    Admission medication history is complete. The information provided in this note is only as accurate as the sources available at the time of the update.    Medication reconciliation/reorder completed by provider prior to medication history? No    Information Source(s): Patient and CareEverywhere/SureScripts via in-person    Pertinent Information: Patient recently completed 5 days of nitrofurantoin prescribed 8/16/23    Changes made to PTA medication list:  Added: None  Deleted: Nitrofurantoin  Changed: None    Medication Affordability:  Not including over the counter (OTC) medications, was there a time in the past 3 months when you did not take your medications as prescribed because of cost?: No    Allergies reviewed with patient and updates made in EHR: yes    Medication History Completed By: Babs Santoro, PharmD 8/24/2023 6:20 AM    Prior to Admission medications    Medication Sig Last Dose Taking? Auth Provider Long Term End Date   ibuprofen (ADVIL/MOTRIN) 200 MG tablet Take 400 mg by mouth every 12 hours as needed for pain Past Month at prn 2-3 weeks ago Yes Unknown, Entered By History     norgestim-eth estrad triphasic (ORTHO TRI-CYCLEN) 0.18/0.215/0.25 MG-35 MCG tablet Take 1 tablet by mouth daily Past Month at every evening Yes Chito Coppola MD Yes

## 2023-08-24 NOTE — PROGRESS NOTES
PROVIDER RESTRAINT FOR NON-VIOLENT BEHAVIOR FACE TO FACE EVALUATION    Patient's Immediate Situation:  Patient demonstrated the following behaviors: impulsive; recovering from anesthesia, pulling at critical lines and tubes. Does not respond to redirection.     Patient's Reaction to the intervention:  Does patient understand the reason for restraint/seclusion? Unable to express     Medical Condition:  Is there any evidence of compromise of Skin integrity, Respiratory, Cardiovascular, Musculoskeletal, Hydration?   No    Behavioral Condition:  In consultation with the RN, is there a need to continue this restraint or seclusion? Yes    See Restraint Flowsheet for complete restraint documentation and assessment.    Hannah Ro, CNP  [facility] Pulmonary/Critical Care

## 2023-08-24 NOTE — ANESTHESIA PROCEDURE NOTES
Airway       Patient location during procedure: OR       Procedure Start/Stop Times: 8/24/2023 7:42 AM  Staff -        CRNA: Felicity Curiel APRN CRNA       Performed By: CRNA  Consent for Airway        Urgency: elective  Indications and Patient Condition       Indications for airway management: franky-procedural       Induction type:intravenous       Mask difficulty assessment: 1 - vent by mask    Final Airway Details       Final airway type: endotracheal airway       Successful airway: Single subglottic suction  Endotracheal Airway Details        ETT size (mm): 7.0       Cuffed: yes       Successful intubation technique: direct laryngoscopy       DL Blade Type: [place] 2       Grade View of Cords: 1       Adjucts: stylet       Position: Right       Measured from: lips       Secured at (cm): 19    Post intubation assessment        Placement verified by: capnometry, equal breath sounds and chest rise        Number of attempts at approach: 1       Number of other approaches attempted: 0       Secured with: silk tape       Ease of procedure: easy       Dentition: Intact and Unchanged       Dental guard used and removed.    Medication(s) Administered   Medication Administration Time: 8/24/2023 7:42 AM

## 2023-08-24 NOTE — OP NOTE
DATE OF SERVICE: 8/24/2023.      PREOPERATIVE DIAGNOSES:   1.  Severe mitral regurgitation.   2.  Bileaflet mitral valve prolapse.  3.  Mitral annular dilatation.  4.  Horn Syndrome.      POSTOPERATIVE DIAGNOSES:   1.  Severe mitral regurgitation.   2.  Bileaflet mitral valve prolapse.  3.  Mitral annular dilatation.  4.  Horn Syndrome.      PROCEDURE PERFORMED:   Mitral valve repair (Posterior leaflet quadrangular resection, posterior annular reduction annuloplasty, closure of P1/P2 cleft, placement of A2 scallop neochordae x 3, and placement of 38 mm Medtronic CG Future annuloplasty ring).   Left atrial appendage excision.      SURGEON:  Jeff Magdaleno MD, PhD.      RESIDENT SURGEON: Jeevan Shaffer MD.     FIRST ASSISTANT: Annamaria Fajardo, surgical first assistant.      ANESTHESIA:  General endotracheal anesthesia with a single-lumen endotracheal tube.       ESTIMATED BLOOD LOSS:  1 liter.       SPECIMENS:  Triangular portion of the P2 scallop of the posterior mitral valve leaflet.       INDICATIONS FOR PROCEDURE:  Ms. Blair is a 23-year-old woman with Horn syndrome and severe mitral regurgitation due to bileaflet mitral valve prolapse.and annular dilatation. She has an enlarging left ventricle and dyspnea on exertion. I recommend mitral valve repair. I described the technical details, as well as the expected postoperative course and recovery to the patient. I also discussed the risks and benefits of the procedure. The risks include but are not limited to bleeding, infection, stroke, heart failure, dysrhythmia, respiratory failure, kidney or liver injury, bowel or limb ischemia, and death. The calculated STS risk of mortality is <1%, and the calculated STS risk of major morbidity or mortality is <10%. The patient understands these risks and wishes to undergo the operation.       OPERATIVE FINDINGS:  The ascending aorta was free of calcified plaque. The mitral valve had a myxomatous appearance with annular  dilatation and bileaflet prolapse. The A1 and P1 scallops were only mildly myxomatous and the chordae were normal length. The A2 and P2 chordae were severely myxomatous and redundant with elongated and mildly thickened chordae. The A3 and P3 scallops were more normal appearing and the chordae were only mildly elongated. The posterior annulus was also severely dilated. After reperfusion and defibrillation, sinus rhythm resumed. After repair and separation from cardiopulmonary bypass, there was no mitral regurgitation and the the mean gradient was 5 mmHg. There was no evidence of systolic anterior motion of the mitral leaflet. Left ventricular function was normal preoperatively and unchanged after bypass without inotropic support.      OPERATIVE DESCRIPTION IN DETAIL:  After obtaining informed consent, the patient was brought to the operating room and placed in the supine position on the operating room table.  Appropriate lines and devices for monitoring were placed by the anesthesia team.  The patient underwent smooth induction of general anesthesia, and the trachea was intubated orally with an endotracheal tube.  A timeout was performed to confirm the correct patient identity, as well as the procedure to be performed.       A median sternotomy was performed, and the pericardium was opened. After full heparinization to achieve an activated clotting time over 480 seconds, the distal ascending aorta (18 Fr. Eopa), superior vena cava (24 Fr. Metal tip right angle), and inferior vena cava  (28 Fr. Venous) were cannulated before cardiopulmonary bypass was commenced. An antegrade cardioplegia cannula and a retrograde cardioplegia cannula were placed. The aortic cross clamp was applied across the distal ascending aorta on low flow cardiopulmonary bypass, and the heart was arrested with 1 liter of cold antegrade blood cardioplegia. Subsequent maintenance doses of cold retrograde blood cardioplegia were given approximately  every 20 minutes, and topical cold saline slush was applied for additional protection. The cavae were snared to achieve complete cardiopulmonary bypass.     Left atrial appendage excision was performed and the defect was closed with running 3-0 Prolene; the first layer was running horizontal mattress and the second layer was running continuous.    Sondergaard's groove was dissected with electrocautery, and through a left atriotomy, the mitral valve was exposed with the above findings noted on complete valve assessment. Next,  2-0 Ethibond simple annular sutures were placed circumferentially. A quadrangular portion of the P2 scallop of the posterior mitral valve was excised. The posterior annulus was reduced with two figure-of-eight 2-0 Ethibond sutures extending from the midpoint of the posterior annulus. The edges of the posterior mitral leaflet that remained after the quadrangular excision were then re-apposed with 4-0 Prolene in 2 layers; the first layer in running imbricating fashion, and the second layer in simple running fashion. The cleft between the P1 and P2 scallops was closed with two 4-0 Prolene sutures placed in figure-of-eight fashion. Three 5-0 Gortex neochordae were placed by anchoring one on the anterolateral papillary muscle head and two on the posteromedial papillary muscle head in figure-of-eight fashion and then the ends of the 5-0 Gortex neochordae were brought through the edge of the A2 scallop in locking fashion with one at the edge of A2 across from A1 (anchored to anterolateral papillary muscle), one at the midpoint of A2 (anchored to posteromedial papillary muscle), and one along the edge of A2 across from A3 (anchored to posteromedial papillary muscle) mimicking the anatomy of the native A2 chordae. The length of these neochordae was adjusted by filling the ventricle with saline and setting the length at the level of the annulus with medium size Hemoclips on the Gortex suture. After tying  these neochordae at the appropriate length, the Hemaclips were removed. After appropriate sizing, the annular sutures were brought through a 38 mm [place] Labstronic CG Future annuloplasty band. The annuloplasty band was seated in place and the sutures were secured and cut with the CoreKnot device. The competence of the mitral valve was confirmed with saline. The left atriotomy was closed in two layers with 4-0 Prolene; the first layer in running horizontal mattress and the second layer in simple continuous fashion.     The heart was de-aired. With the patient in Trendelenberg position, the aortic cross clamp was released and the heart was resuscitated. Ventilation was commenced. A ventricular pacing wire was placed and brought out through a separate stab incision. The patient weaned from cardiopulmonary bypass, was given protamine and decannulated.  All bleeding points were controlled.  Topical hemostatic agents were applied. A 28-[place] Islander straight chest tube was placed in the anterior mediastinum and brought out through a separate stab incision. A 24-[place] Islander Bernabe drain was placed in the left pleural space and brought out through a separate stab incision.  The sternotomy incision was closed with 3 interrupted #6 stainless steel sternal wires in the manubrium, 3 double wires in the body of the sternum, and one additional #6 stainless steel sternal wire at the lower aspect of the sternum.  The muscle, fascia and skin were closed in layers with absorbable suture.  Dermabond was applied.  All sponge, needle and instrument counts were correct at the end of the case.        CARLOS CORREA MD

## 2023-08-24 NOTE — PROGRESS NOTES
RT PROGRESS NOTE    VENT DAY# 1    CURRENT SETTINGS:   Vent Mode: CMV/AC  (Continuous Mandatory Ventilation/ Assist Control)  FiO2 (%): 60 %  Resp Rate (Set): 18 breaths/min  Tidal Volume (Set, mL): 400 mL  PEEP (cm H2O): 5 cmH2O  Resp: 20      PATIENT PARAMETERS:  PIP 18  Pplat:  14  Pmean:  8.3  Compliance: 38.9  02 Sats:  99%  BS: clear     ETT SIZE 7.0 Secured at 19 cm at teeth/gums    Respiratory Medications: none     NOTE / SHIFT SUMMARY:       12:30- Pt. arrived from OR, post MV repair.  ETT 7.0, 19 @teeth , VCV 18/400/60%/+5 sats 100%. BS clear. Goal for extubation by 18:30      13:40- FIO2 turned down to 40% sats 99%    Opal Rockwell, RT

## 2023-08-24 NOTE — H&P
H&P Update    The patient has no new complaints and there are no new exam findings. The H&P is up to date. Please see the note below for details.    Jeff Magdaleno MD  7:11 AM  8/24/2023    Cardiac and Thoracic Surgery Consultation      Charlotte Blair MRN# [id number]   YOB: 2000 Age: 23 year old         Reason for consult: I was asked by Dr. Garcia to evaluate this patient for severe mitral regurgitation.           Assessment and Plan:   Ms. Blair is a 23-year-old woman with myxomatous degeneration of the mitral valve, severe mitral regurgitation and heart failure symptoms. She also has Horn syndrome. I recommend mitral valve repair, and if this is not possible, then mitral replacement. I will plan to excise the left atrial appendage with the expectation that the valve will be repairable. I described the technical details, as well as the expected postoperative course and recovery to the patient. I also discussed the risks and benefits of the procedure. The risks include but are not limited to bleeding, infection, stroke, heart failure, dysrhythmia, respiratory failure, kidney or liver injury, bowel or limb ischemia, and death. The calculated STS risk of mortality is 1%, and the calculated STS risk of major morbidity or mortality is 15%. The patient understands these risks and wishes to undergo the operation.      I discussed the option of a bioprosthetic versus a mechanical valve with the patients. The patient understands that a bioprosthetic valve would not require lifelong anticoagulation, but there is a risk of prosthetic valve degeneration. I also explained that while a mechanical valve has unlimited durability, this would require lifelong anticoagulation with Coumadin. There is also a small risk of hearing the valve click. The risk of infection is equal between the two. If replacement is necessary, she would like to have a mechanical valve.     Surgery will be scheduled in the  coming months.     After dental evaluation, the preoperative evaluation will be complete.              Chief Complaint:   Ms. Blair is a 23-year-old woman with myxomatous degeneration of the mitral valve, severe mitral regurgitation and heart failure symptoms.      History is obtained from the patient          History of Present Illness:   This patient is a 23 year old female who presents to discuss treatment of her severe mitral regurgitation due to myxomatous mitral degeneration with bileaflet prolapse. She has noted worsening exertional dyspnea in the last year. She also endorses fatigue and lower extremity edema. She denies palpitations, and she has not had orthopnea or paroxysmal nocturnal dyspnea.     She also has Horn syndrome.       She does not plan on getting pregnant and because of potential fertility complications of Horn syndrome, this may not be possible.                 Past Medical History:      Past Medical History        Past Medical History:   Diagnosis Date    Anxiety       previous on zoloft    Disease of thyroid gland       hypothyroxine    Gonadal dysgenesis       Created by Conversion     Urinary tract infection       non recurrent                 Past Surgical History:      Past Surgical History         Past Surgical History:   Procedure Laterality Date    WISDOM TOOTH EXTRACTION                       Social History:      Social History            Tobacco Use    Smoking status: Every Day       Types: Cigarettes    Smokeless tobacco: Never   Substance Use Topics    Alcohol use: No              Family History:      Family History         Family History   Problem Relation Age of Onset    Coronary Artery Disease Father      Hypertension Mother      Ovarian cysts Sister      Hyperlipidemia Brother      No Known Problems Maternal Grandmother      Coronary Artery Disease Maternal Grandfather      Parkinsonism Paternal Grandmother      Suicidality Paternal Grandfather                    Immunizations:           Immunization History   Administered Date(s) Administered    DTaP, Unspecified 10/02/2003    Flu, Unspecified 09/01/2018    HPV Quadrivalent 07/20/2007, 03/30/2009    Hepatitis B (Peds <19Y) 11/20/2003    Influenza Vaccine, 6+MO IM (QUADRIVALENT W/PRESERVATIVES) 11/03/2017    MMR 10/02/2003    Poliovirus, inactivated (IPV) 10/02/2003    TDAP (Adacel,Boostrix) 11/23/2014              Allergies:           Allergies   Allergen Reactions    Amoxicillin Hives    Penicillins Hives              Medications:          Current Outpatient Medications Ordered in Epic   Medication    norgestim-eth estrad triphasic (ORTHO TRI-CYCLEN) 0.18/0.215/0.25 MG-35 MCG tablet      No current Epic-ordered facility-administered medications on file.              Review of Systems:      The 10 point Review of Systems is negative other than noted in the HPI            Physical Exam:   Vitals were reviewed      BP: 139/76 Pulse: 90   Resp: 16 SpO2: 98 %      Constitutional:    awake, alert, cooperative, no apparent distress, and appears stated age      Eyes:    Lids and lashes normal, pupils equal, round and reactive to light, extra ocular muscles intact, sclera clear, conjunctiva normal      ENT:    normocepalic, without obvious abnormality      Neck:    supple, symmetrical, trachea midline      Lungs:    no increased work of breathing, good air exchange, and no retractions      Cardiovascular:    regular rate and rhythm      Abdomen:    non-distended      Musculoskeletal:    no lower extremity pitting edema present  there is no redness, warmth, or swelling of the joints  full range of motion noted  motor strength is 5 out of 5 all extremities bilaterally      Neurologic:    Mental Status Exam:  Level of Alertness:   awake  Orientation:   person, place, time  Cranial Nerves:  cranial nerves II-XII are grossly intact  Motor Exam:  moves all extremities well and symmetrically      Neuropsychiatric:    General: normal,  calm, and normal eye contact  Level of consciousness: alert / normal  Affect: normal      Skin:    no bruising or bleeding, normal skin color, texture, turgor, and no redness, warmth, or swelling           Data:   All laboratory data reviewed  All cardiac studies reviewed by me.  All imaging studies reviewed by me.     TRANSTHORACIC ECHOCARDIOGRAPHY:  1. The left ventricle is mildly enlarged. Normal left ventricular systolic  performance with a visually estimated ejection fraction of 65-70%.  2. There is prolapse of both the anterior & posterior mitral valve leaflets  with moderate-severe mitral insufficiency.  3. Normal right ventricular size and systolic performance.  4. There is moderate to severe left atrial enlargement.     TRANSESOPHAGEAL ECHOCARDIOGRAPHY:  Left ventricular size, wall motion and function are normal. The ejection  fraction is 60-65%.  Normal right ventricle size and systolic function.  Systolic flow reversal visualized in left superior pulmonary vein only.  There is severe (4+) mitral regurgitation.  The mitral valve leaflets appear myxomatous.  Severe mitral valve prolapse, anterior leaflet  Severe mitral valve prolapse, posterior leaflet

## 2023-08-24 NOTE — ANESTHESIA PROCEDURE NOTES
Arterial Line Procedure Note    Pre-Procedure   Staff -        Anesthesiologist:  Anat Rivera MD       Performed By: anesthesiologist       Location: pre-op       Pre-Anesthestic Checklist: patient identified, IV checked, risks and benefits discussed, informed consent, monitors and equipment checked, pre-op evaluation and at physician/surgeon's request  Timeout:       Correct Patient: Yes        Correct Procedure: Yes        Correct Site: Yes        Correct Position: Yes   Line Placement:   This line was placed Pre Induction starting at 8/24/2023 7:26 AM and ending at 8/24/2023 7:36 AM  Procedure   Procedure: arterial line       Laterality: right       Insertion Site: brachial.  Sterile Prep        Standard elements of sterile barrier followed       Skin prep: Chloraprep  Insertion/Injection        Technique: ultrasound guided and Seldinger Technique        1. Ultrasound was used to evaluate the access site.       2. Artery evaluated via ultrasound for patency/adequacy.       3. Using real-time ultrasound the needle/catheter was observed entering the artery/vein.       4. Permanent image was captured and entered into the patient's record.       Catheter Type/Size: 20 G, 12 cm  Narrative         Secured by: suture       Tegaderm and Biopatch dressing used.       Complications: None apparent,        Arterial waveform: Yes

## 2023-08-24 NOTE — ANESTHESIA CARE TRANSFER NOTE
Patient: Charlotte Blair    Procedure: Procedure(s):  Mitral valve repair, left atrial appendage ligation with ANESTHESIA TRANSESOPHAGEAL ECHOCARDIOGRAM       Diagnosis: Mitral valve prolapse [I34.1]  Horn syndrome [Q96.9]  Mitral regurgitation [I34.0]  Diagnosis Additional Information: No value filed.    Anesthesia Type:   General     Note:    Oropharynx: endotracheal tube in place and ventilatory support  Level of Consciousness: iatrogenic sedation      Independent Airway: airway patency not satisfactory and stable  Dentition: dentition unchanged  Vital Signs Stable: post-procedure vital signs reviewed and stable  Report to RN Given: handoff report given  Patient transferred to: ICU  Comments: Patient transported to ICU with ambu bag on transport monitor, vital signs monitored continuously throughout transport. MDA and surgeon present for transport and handoff.   ICU Handoff: Call for PAUSE to initiate/utilize ICU HANDOFF, Identified Patient, Identified Responsible Provider, Reviewed the Pertinent Medical History, Discussed Surgical Course, Reviewed Intra-OP Anesthesia Management and Issues during Anesthesia, Set Expectations for Post Procedure Period and Allowed Opportunity for Questions and Acknowledgement of Understanding      Vitals:  Vitals Value Taken Time   /90    Temp     Pulse 82 08/24/23 1225   Resp 16    SpO2 100 % 08/24/23 1225   Vitals shown include unvalidated device data.    Electronically Signed By: CALISTA Smith CRNA  August 24, 2023  12:27 PM

## 2023-08-24 NOTE — PLAN OF CARE
CT EXTUBATION FAST TRACK:    [facility] - ICU     FastTrack Candidate: Yes, Extubation Goal Time ( 6 Hours ) 1830     Patient Status: Extubated at 1522

## 2023-08-25 ENCOUNTER — APPOINTMENT (OUTPATIENT)
Dept: OCCUPATIONAL THERAPY | Facility: HOSPITAL | Age: 23
DRG: 220 | End: 2023-08-25
Attending: SURGERY
Payer: COMMERCIAL

## 2023-08-25 ENCOUNTER — APPOINTMENT (OUTPATIENT)
Dept: RADIOLOGY | Facility: HOSPITAL | Age: 23
DRG: 220 | End: 2023-08-25
Attending: PHYSICIAN ASSISTANT
Payer: COMMERCIAL

## 2023-08-25 LAB
ALBUMIN SERPL BCG-MCNC: 3.5 G/DL (ref 3.5–5.2)
ALP SERPL-CCNC: 40 U/L (ref 35–104)
ALT SERPL W P-5'-P-CCNC: 20 U/L (ref 0–50)
ANION GAP SERPL CALCULATED.3IONS-SCNC: 10 MMOL/L (ref 7–15)
ANION GAP SERPL CALCULATED.3IONS-SCNC: 12 MMOL/L (ref 7–15)
AST SERPL W P-5'-P-CCNC: 57 U/L (ref 0–45)
ATRIAL RATE - MUSE: 85 BPM
BILIRUB SERPL-MCNC: 0.7 MG/DL
BUN SERPL-MCNC: 6.8 MG/DL (ref 6–20)
BUN SERPL-MCNC: 7.3 MG/DL (ref 6–20)
CALCIUM SERPL-MCNC: 7.8 MG/DL (ref 8.6–10)
CALCIUM SERPL-MCNC: 8.1 MG/DL (ref 8.6–10)
CALCIUM, IONIZED MEASURED: 1.07 MMOL/L (ref 1.11–1.3)
CALCIUM, IONIZED MEASURED: 1.13 MMOL/L (ref 1.11–1.3)
CHLORIDE SERPL-SCNC: 105 MMOL/L (ref 98–107)
CHLORIDE SERPL-SCNC: 108 MMOL/L (ref 98–107)
CREAT SERPL-MCNC: 0.5 MG/DL (ref 0.51–0.95)
CREAT SERPL-MCNC: 0.51 MG/DL (ref 0.51–0.95)
DEPRECATED HCO3 PLAS-SCNC: 22 MMOL/L (ref 22–29)
DEPRECATED HCO3 PLAS-SCNC: 22 MMOL/L (ref 22–29)
DIASTOLIC BLOOD PRESSURE - MUSE: NORMAL MMHG
ERYTHROCYTE [DISTWIDTH] IN BLOOD BY AUTOMATED COUNT: 11.9 % (ref 10–15)
GFR SERPL CREATININE-BSD FRML MDRD: >90 ML/MIN/1.73M2
GFR SERPL CREATININE-BSD FRML MDRD: >90 ML/MIN/1.73M2
GLUCOSE BLDC GLUCOMTR-MCNC: 107 MG/DL (ref 70–99)
GLUCOSE BLDC GLUCOMTR-MCNC: 113 MG/DL (ref 70–99)
GLUCOSE BLDC GLUCOMTR-MCNC: 117 MG/DL (ref 70–99)
GLUCOSE BLDC GLUCOMTR-MCNC: 118 MG/DL (ref 70–99)
GLUCOSE SERPL-MCNC: 123 MG/DL (ref 70–99)
GLUCOSE SERPL-MCNC: 128 MG/DL (ref 70–99)
HCT VFR BLD AUTO: 30.4 % (ref 35–47)
HGB BLD-MCNC: 10 G/DL (ref 11.7–15.7)
INTERPRETATION ECG - MUSE: NORMAL
ION CA PH 7.4: 1.11 MMOL/L (ref 1.11–1.3)
ION CA PH 7.4: 1.15 MMOL/L (ref 1.11–1.3)
MAGNESIUM SERPL-MCNC: 1.9 MG/DL (ref 1.7–2.3)
MCH RBC QN AUTO: 29.7 PG (ref 26.5–33)
MCHC RBC AUTO-ENTMCNC: 32.9 G/DL (ref 31.5–36.5)
MCV RBC AUTO: 90 FL (ref 78–100)
P AXIS - MUSE: 29 DEGREES
PATH REPORT.COMMENTS IMP SPEC: NORMAL
PATH REPORT.COMMENTS IMP SPEC: NORMAL
PATH REPORT.FINAL DX SPEC: NORMAL
PATH REPORT.GROSS SPEC: NORMAL
PATH REPORT.MICROSCOPIC SPEC OTHER STN: NORMAL
PATH REPORT.RELEVANT HX SPEC: NORMAL
PH: 7.38 (ref 7.35–7.45)
PH: 7.53 (ref 7.35–7.45)
PHOSPHATE SERPL-MCNC: 3.9 MG/DL (ref 2.5–4.5)
PHOTO IMAGE: NORMAL
PLATELET # BLD AUTO: 127 10E3/UL (ref 150–450)
POTASSIUM SERPL-SCNC: 3.6 MMOL/L (ref 3.4–5.3)
POTASSIUM SERPL-SCNC: 3.6 MMOL/L (ref 3.4–5.3)
POTASSIUM SERPL-SCNC: 3.7 MMOL/L (ref 3.4–5.3)
PR INTERVAL - MUSE: 194 MS
PROT SERPL-MCNC: 4.8 G/DL (ref 6.4–8.3)
QRS DURATION - MUSE: 100 MS
QT - MUSE: 426 MS
QTC - MUSE: 506 MS
R AXIS - MUSE: 94 DEGREES
RBC # BLD AUTO: 3.37 10E6/UL (ref 3.8–5.2)
SODIUM SERPL-SCNC: 139 MMOL/L (ref 136–145)
SODIUM SERPL-SCNC: 140 MMOL/L (ref 136–145)
SYSTOLIC BLOOD PRESSURE - MUSE: NORMAL MMHG
T AXIS - MUSE: 10 DEGREES
VENTRICULAR RATE- MUSE: 85 BPM
WBC # BLD AUTO: 10.6 10E3/UL (ref 4–11)

## 2023-08-25 PROCEDURE — 250N000013 HC RX MED GY IP 250 OP 250 PS 637: Performed by: SURGERY

## 2023-08-25 PROCEDURE — 250N000011 HC RX IP 250 OP 636: Mod: JZ | Performed by: SURGERY

## 2023-08-25 PROCEDURE — 97166 OT EVAL MOD COMPLEX 45 MIN: CPT | Mod: GO

## 2023-08-25 PROCEDURE — 84132 ASSAY OF SERUM POTASSIUM: CPT | Performed by: SURGERY

## 2023-08-25 PROCEDURE — 99207 PR NO BILLABLE SERVICE THIS VISIT: CPT | Performed by: NURSE PRACTITIONER

## 2023-08-25 PROCEDURE — 71045 X-RAY EXAM CHEST 1 VIEW: CPT

## 2023-08-25 PROCEDURE — 82310 ASSAY OF CALCIUM: CPT | Performed by: NURSE PRACTITIONER

## 2023-08-25 PROCEDURE — 250N000011 HC RX IP 250 OP 636: Performed by: SURGERY

## 2023-08-25 PROCEDURE — 999N000156 HC STATISTIC RCP CONSULT EA 30 MIN

## 2023-08-25 PROCEDURE — 250N000013 HC RX MED GY IP 250 OP 250 PS 637: Performed by: PHYSICIAN ASSISTANT

## 2023-08-25 PROCEDURE — 97110 THERAPEUTIC EXERCISES: CPT | Mod: GO

## 2023-08-25 PROCEDURE — 999N000157 HC STATISTIC RCP TIME EA 10 MIN

## 2023-08-25 PROCEDURE — 36415 COLL VENOUS BLD VENIPUNCTURE: CPT | Performed by: SURGERY

## 2023-08-25 PROCEDURE — 999N000127 HC STATISTIC PERIPHERAL IV START W US GUIDANCE

## 2023-08-25 PROCEDURE — 97535 SELF CARE MNGMENT TRAINING: CPT | Mod: GO

## 2023-08-25 PROCEDURE — 88305 TISSUE EXAM BY PATHOLOGIST: CPT | Mod: 26 | Performed by: PATHOLOGY

## 2023-08-25 PROCEDURE — 200N000001 HC R&B ICU

## 2023-08-25 PROCEDURE — 93005 ELECTROCARDIOGRAM TRACING: CPT

## 2023-08-25 PROCEDURE — 82330 ASSAY OF CALCIUM: CPT | Performed by: PHYSICIAN ASSISTANT

## 2023-08-25 PROCEDURE — 85027 COMPLETE CBC AUTOMATED: CPT | Performed by: PHYSICIAN ASSISTANT

## 2023-08-25 PROCEDURE — 93010 ELECTROCARDIOGRAM REPORT: CPT | Performed by: GENERAL ACUTE CARE HOSPITAL

## 2023-08-25 PROCEDURE — 83735 ASSAY OF MAGNESIUM: CPT | Performed by: PHYSICIAN ASSISTANT

## 2023-08-25 PROCEDURE — 999N000204 HC STATISTICAL VASC ACCESS NURSE TIME, 31-45 MINUTES

## 2023-08-25 PROCEDURE — 93005 ELECTROCARDIOGRAM TRACING: CPT | Performed by: PHYSICIAN ASSISTANT

## 2023-08-25 PROCEDURE — 82330 ASSAY OF CALCIUM: CPT | Performed by: SURGERY

## 2023-08-25 PROCEDURE — 84100 ASSAY OF PHOSPHORUS: CPT | Performed by: PHYSICIAN ASSISTANT

## 2023-08-25 PROCEDURE — 250N000011 HC RX IP 250 OP 636: Performed by: PHYSICIAN ASSISTANT

## 2023-08-25 RX ORDER — ASPIRIN 81 MG/1
81 TABLET, CHEWABLE ORAL DAILY
Status: DISCONTINUED | OUTPATIENT
Start: 2023-08-26 | End: 2023-08-28 | Stop reason: HOSPADM

## 2023-08-25 RX ORDER — FUROSEMIDE 20 MG
20 TABLET ORAL ONCE
Status: COMPLETED | OUTPATIENT
Start: 2023-08-25 | End: 2023-08-25

## 2023-08-25 RX ORDER — LIDOCAINE 4 G/G
2 PATCH TOPICAL
Status: DISCONTINUED | OUTPATIENT
Start: 2023-08-26 | End: 2023-08-28 | Stop reason: HOSPADM

## 2023-08-25 RX ORDER — POTASSIUM CHLORIDE 1500 MG/1
20 TABLET, EXTENDED RELEASE ORAL ONCE
Status: COMPLETED | OUTPATIENT
Start: 2023-08-25 | End: 2023-08-25

## 2023-08-25 RX ORDER — MAGNESIUM OXIDE 400 MG/1
400 TABLET ORAL EVERY 4 HOURS
Status: DISCONTINUED | OUTPATIENT
Start: 2023-08-25 | End: 2023-08-25

## 2023-08-25 RX ORDER — METHOCARBAMOL 750 MG/1
750 TABLET, FILM COATED ORAL 4 TIMES DAILY
Status: DISCONTINUED | OUTPATIENT
Start: 2023-08-25 | End: 2023-08-26

## 2023-08-25 RX ORDER — MAGNESIUM OXIDE 400 MG/1
400 TABLET ORAL 4 TIMES DAILY
Status: DISCONTINUED | OUTPATIENT
Start: 2023-08-25 | End: 2023-08-28 | Stop reason: HOSPADM

## 2023-08-25 RX ADMIN — OXYCODONE HYDROCHLORIDE 10 MG: 5 TABLET ORAL at 16:01

## 2023-08-25 RX ADMIN — MAGNESIUM OXIDE TAB 400 MG (241.3 MG ELEMENTAL MG) 400 MG: 400 (241.3 MG) TAB at 12:41

## 2023-08-25 RX ADMIN — METHOCARBAMOL 750 MG: 750 TABLET ORAL at 16:49

## 2023-08-25 RX ADMIN — OXYCODONE HYDROCHLORIDE 10 MG: 5 TABLET ORAL at 07:18

## 2023-08-25 RX ADMIN — PROCHLORPERAZINE EDISYLATE 10 MG: 5 INJECTION, SOLUTION INTRAMUSCULAR; INTRAVENOUS at 09:34

## 2023-08-25 RX ADMIN — HYDROMORPHONE HYDROCHLORIDE 0.4 MG: 0.2 INJECTION, SOLUTION INTRAMUSCULAR; INTRAVENOUS; SUBCUTANEOUS at 16:48

## 2023-08-25 RX ADMIN — ONDANSETRON 4 MG: 2 INJECTION INTRAMUSCULAR; INTRAVENOUS at 02:17

## 2023-08-25 RX ADMIN — PANTOPRAZOLE SODIUM 40 MG: 40 TABLET, DELAYED RELEASE ORAL at 08:07

## 2023-08-25 RX ADMIN — MAGNESIUM OXIDE TAB 400 MG (241.3 MG ELEMENTAL MG) 400 MG: 400 (241.3 MG) TAB at 21:17

## 2023-08-25 RX ADMIN — OXYCODONE HYDROCHLORIDE 10 MG: 5 TABLET ORAL at 21:16

## 2023-08-25 RX ADMIN — HYDROMORPHONE HYDROCHLORIDE 0.4 MG: 0.2 INJECTION, SOLUTION INTRAMUSCULAR; INTRAVENOUS; SUBCUTANEOUS at 12:40

## 2023-08-25 RX ADMIN — HEPARIN SODIUM 5000 UNITS: 10000 INJECTION, SOLUTION INTRAVENOUS; SUBCUTANEOUS at 21:05

## 2023-08-25 RX ADMIN — CALCIUM GLUCONATE 1 G: 20 INJECTION, SOLUTION INTRAVENOUS at 00:51

## 2023-08-25 RX ADMIN — METHOCARBAMOL 750 MG: 750 TABLET ORAL at 21:17

## 2023-08-25 RX ADMIN — OXYCODONE HYDROCHLORIDE 10 MG: 5 TABLET ORAL at 12:01

## 2023-08-25 RX ADMIN — HYDROMORPHONE HYDROCHLORIDE 0.2 MG: 0.2 INJECTION, SOLUTION INTRAMUSCULAR; INTRAVENOUS; SUBCUTANEOUS at 18:58

## 2023-08-25 RX ADMIN — METHOCARBAMOL 750 MG: 750 TABLET ORAL at 09:34

## 2023-08-25 RX ADMIN — HYDROMORPHONE HYDROCHLORIDE 0.4 MG: 0.2 INJECTION, SOLUTION INTRAMUSCULAR; INTRAVENOUS; SUBCUTANEOUS at 07:45

## 2023-08-25 RX ADMIN — HYDROMORPHONE HYDROCHLORIDE 0.4 MG: 0.2 INJECTION, SOLUTION INTRAMUSCULAR; INTRAVENOUS; SUBCUTANEOUS at 14:43

## 2023-08-25 RX ADMIN — CALCIUM GLUCONATE 1 G: 20 INJECTION, SOLUTION INTRAVENOUS at 07:14

## 2023-08-25 RX ADMIN — CEFAZOLIN 1 G: 1 INJECTION, POWDER, FOR SOLUTION INTRAMUSCULAR; INTRAVENOUS at 00:51

## 2023-08-25 RX ADMIN — ACETAMINOPHEN 975 MG: 325 TABLET ORAL at 05:40

## 2023-08-25 RX ADMIN — HYDROMORPHONE HYDROCHLORIDE 0.2 MG: 0.2 INJECTION, SOLUTION INTRAMUSCULAR; INTRAVENOUS; SUBCUTANEOUS at 21:13

## 2023-08-25 RX ADMIN — HEPARIN SODIUM 5000 UNITS: 10000 INJECTION, SOLUTION INTRAVENOUS; SUBCUTANEOUS at 14:43

## 2023-08-25 RX ADMIN — HYDROMORPHONE HYDROCHLORIDE 0.4 MG: 0.2 INJECTION, SOLUTION INTRAMUSCULAR; INTRAVENOUS; SUBCUTANEOUS at 02:35

## 2023-08-25 RX ADMIN — MAGNESIUM OXIDE TAB 400 MG (241.3 MG ELEMENTAL MG) 400 MG: 400 (241.3 MG) TAB at 07:50

## 2023-08-25 RX ADMIN — MAGNESIUM OXIDE TAB 400 MG (241.3 MG ELEMENTAL MG) 400 MG: 400 (241.3 MG) TAB at 16:49

## 2023-08-25 RX ADMIN — OXYCODONE HYDROCHLORIDE 10 MG: 5 TABLET ORAL at 02:17

## 2023-08-25 RX ADMIN — CEFAZOLIN 1 G: 1 INJECTION, POWDER, FOR SOLUTION INTRAMUSCULAR; INTRAVENOUS at 09:34

## 2023-08-25 RX ADMIN — HYDROMORPHONE HYDROCHLORIDE 0.2 MG: 0.2 INJECTION, SOLUTION INTRAMUSCULAR; INTRAVENOUS; SUBCUTANEOUS at 23:14

## 2023-08-25 RX ADMIN — KETOROLAC TROMETHAMINE 30 MG: 30 INJECTION, SOLUTION INTRAMUSCULAR; INTRAVENOUS at 15:33

## 2023-08-25 RX ADMIN — VANCOMYCIN HYDROCHLORIDE 1000 MG: 1 INJECTION, SOLUTION INTRAVENOUS at 05:40

## 2023-08-25 RX ADMIN — ONDANSETRON 4 MG: 2 INJECTION INTRAMUSCULAR; INTRAVENOUS at 16:48

## 2023-08-25 RX ADMIN — HYDROMORPHONE HYDROCHLORIDE 0.4 MG: 0.2 INJECTION, SOLUTION INTRAMUSCULAR; INTRAVENOUS; SUBCUTANEOUS at 05:44

## 2023-08-25 RX ADMIN — POLYETHYLENE GLYCOL 3350 17 G: 17 POWDER, FOR SOLUTION ORAL at 08:07

## 2023-08-25 RX ADMIN — METHOCARBAMOL 750 MG: 750 TABLET ORAL at 12:41

## 2023-08-25 RX ADMIN — SENNOSIDES AND DOCUSATE SODIUM 1 TABLET: 50; 8.6 TABLET ORAL at 21:16

## 2023-08-25 RX ADMIN — FUROSEMIDE 20 MG: 20 TABLET ORAL at 12:01

## 2023-08-25 RX ADMIN — ACETAMINOPHEN 975 MG: 325 TABLET ORAL at 21:16

## 2023-08-25 RX ADMIN — SENNOSIDES AND DOCUSATE SODIUM 1 TABLET: 50; 8.6 TABLET ORAL at 08:07

## 2023-08-25 RX ADMIN — KETOROLAC TROMETHAMINE 30 MG: 30 INJECTION, SOLUTION INTRAMUSCULAR; INTRAVENOUS at 07:46

## 2023-08-25 RX ADMIN — ACETAMINOPHEN 975 MG: 325 TABLET ORAL at 14:42

## 2023-08-25 RX ADMIN — ASPIRIN 81 MG CHEWABLE TABLET 162 MG: 81 TABLET CHEWABLE at 08:07

## 2023-08-25 RX ADMIN — POTASSIUM CHLORIDE 20 MEQ: 1500 TABLET, EXTENDED RELEASE ORAL at 07:50

## 2023-08-25 ASSESSMENT — ACTIVITIES OF DAILY LIVING (ADL)
ADLS_ACUITY_SCORE: 26
PREVIOUS_RESPONSIBILITIES: WORK
ADLS_ACUITY_SCORE: 24
ADLS_ACUITY_SCORE: 26
ADLS_ACUITY_SCORE: 24
ADLS_ACUITY_SCORE: 26
ADLS_ACUITY_SCORE: 24
ADLS_ACUITY_SCORE: 24
ADLS_ACUITY_SCORE: 26
ADLS_ACUITY_SCORE: 26
ADLS_ACUITY_SCORE: 24

## 2023-08-25 NOTE — PROGRESS NOTES
"Occupational Therapy/Cardiac Rehab     08/25/23 1115   Appointment Info   Signing Clinician's Name / Credentials (OT) Cynthia Coronado OTR/L   Living Environment   People in Home parent(s)  (plans to DC to mother's home-pt reports she will have 24/7 A)   Current Living Arrangements house   Home Accessibility stairs to enter home;stairs within home   Number of Stairs, Main Entrance 1   Stair Railings, Main Entrance none   Number of Stairs, Within Home, Primary six   Stair Railings, Within Home, Primary railing on left side (ascending)   Living Environment Comments walk in shower w/chair   Self-Care   Usual Activity Tolerance good   Current Activity Tolerance fair   Regular Exercise No   Equipment Currently Used at Home none   Fall history within last six months no   Activity/Exercise/Self-Care Comment prior to admit, independent w/ADLs/IADLs   Instrumental Activities of Daily Living (IADL)   Previous Responsibilities work  (works as pet tech.)   General Information   Onset of Illness/Injury or Date of Surgery 08/24/23   Referring Physician Jeff Magdaleno   Patient/Family Therapy Goal Statement (OT) none stated   Existing Precautions/Restrictions cardiac;sternal   General Observations and Info Per chart:  \"23 year old female with with myxomatous degeneration of the mitral valve, severe mitral regurgitation and heart failure symptoms. Underwent MV repair\"  PMH includes Horn Syndrome   Cognitive Status Examination   Orientation Status orientation to person, place and time   Affect/Mental Status (Cognitive) WNL   Follows Commands WNL   Pain Assessment   Patient Currently in Pain Yes, see Vital Sign flowsheet  (incisional; CT site)   Posture   Posture not impaired   Bed Mobility   Comment (Bed Mobility) mod A of 2 sup to sit   Transfers   Transfer Comments min A of 2 bed & chair trfs   Balance   Balance Comments CGA using 4WW in room; no unsteadiness.   Clinical Impression   Criteria for Skilled Therapeutic " Interventions Met (OT) Yes, treatment indicated   OT Diagnosis decreased ADLs   OT Problem List-Impairments impacting ADL activity tolerance impaired;mobility;strength;post-surgical precautions   Assessment of Occupational Performance 3-5 Performance Deficits   Identified Performance Deficits bed mob, trfs, functional mob, decreased activity tolerance   Planned Therapy Interventions (OT) ADL retraining;bed mobility training;strengthening;transfer training;progressive activity/exercise;home program guidelines   Clinical Decision Making Complexity (OT) moderate complexity   Anticipated Equipment Needs Upon Discharge (OT)   (to be further assessed)   Risk & Benefits of therapy have been explained evaluation/treatment results reviewed;care plan/treatment goals reviewed;risks/benefits reviewed;current/potential barriers reviewed;participants voiced agreement with care plan;participants included;patient   Clinical Impression Comments Pt presents w/decreased activity tolerance, pain, weakness & post-op restrictions that impact her ability to complete all ADLs, trfs, functional mob, & stairs.   OT Total Evaluation Time   OT Eval, Moderate Complexity Minutes (28701) 12   OT Goals   Therapy Frequency (OT) 2 times/day   OT Predicted Duration/Target Date for Goal Attainment 09/01/23   OT Goals Cardiac Phase 1   OT: Understanding of cardiac education to maximize quality of life, condition management, and health outcomes Patient;Caregiver;Verbalize;Demonstrate   OT: Perform aerobic activity with stable cardiovascular response intermittent;15 minutes   OT: Functional/aerobic ambulation tolerance with stable cardiovascular response in order to return to home and community environment Supervision/SBA;Greater than 300 feet   OT: Navigation of stairs simulating home set up with stable cardiovascular response in order to return to home and community environment Supervision/SBA;6 stairs   Self-Care/Home Management   Self-Care/Home  Mgmt/ADL, Compensatory, Meal Prep Minutes (21782) 15   Symptoms Noted During/After Treatment (Meal Preparation/Planning Training) fatigue;increased pain   Treatment Detail/Skilled Intervention Additional bed & chair trfs w/CGA/min A of 2. Initiated CR educ including sternal precautions, bed mob & trf tech.   Therapeutic Procedures/Exercise   Therapeutic Procedure: strength, endurance, ROM, flexibillity minutes (99095) 10   Symptoms Noted During/After Treatment fatigue;shortness of breath   Treatment Detail/Skilled Intervention see amb   Treatment Time Includes (CR Only) Extra time changing tele monitor;See specific exercise details intervention group(s)   Ambulation   Workload 175 ft   Symptoms Fatigue   Exercise Details CGA w/4WW; good pace/tolerated well.   Vital Signs Details pre:  HR 83, 96%, BP 88/52.  Post:  HR 85, 97%, /58   Cardiac Education   Education Provided Diagnosis;Precautions;Resuming home activities   OT Discharge Planning   OT Plan lots of pain @ end of session-very tough time tolerating chair, longer walks w/4WW   OT Discharge Recommendation (DC Rec) home with outpatient cardiac rehab   OT Rationale for DC Rec Pt currently requires A 1-2 for ADLs, trfs, bed mob & functional mob w/4WW. Anticipate pt will progress to A of 1 or less in order to DC home w/family A. Verified that pt will have 24/7 A first wk @ home/staying @ her mother's home.   OT Brief overview of current status mod A of 2 bed mob, min A of 2 trfs, min/CGA amb w/4WW   Total Session Time   Timed Code Treatment Minutes 25   Total Session Time (sum of timed and untimed services) 37

## 2023-08-25 NOTE — PROGRESS NOTES
"RN Progress Note:          Assessments Summary:      Please refer to flowsheet rows for full assessment      Neurologic  - A&O  - Labile behavior observed throughout shift (quickly switches between pleasant and throwing a tantrum)   Cardiac  - SR with rare ectopy (7 beat VT x1)  - Hemodynamics WNL  - Pacemaker on standby  Respiratory  - 2L NC  - 750mL on IS  Genitourinary   - Robbins with UOP WNL  - Pt feels urge to void, states \"I hate this fucking thing. It hurts.\" Catheter checked multiple times throughout shift; cleansed and adjusted. Pt reported she \"felt better\" after   Gastrointestinal  - Hypoactive BS   - Pt experiencing nausea and emesis after drinking water. RN urged pt to utilize slow intake with ice chips first in order to be able to tolerate liquids; pt declined. Continues to drink and \"feel nauseous.\" Education provided with her verbalization of understanding; chooses not to follow instruction  Integumentary  - Sternal incision ALEXANDER and WNL  - CT dressing intact  Musculoskeletal  - Sternal precautions  Labs & Protocols  - Ionized Ca protocol warranted replacement x 1   Vitals  - Afebrile  - BP WNL without use of vasopressors  Medications  (refer to MAR for times and doses)  - PRN N/V meds given (Zofran and Compazine)  - PRN pain meds given (Dilaudid, Toradol and Oxy)  - PRN fluid bolus given x1 (LR 250mL given for MAP <65)          Mobility:      -          Key Events this Shift:      -              Plan:      - Pull lines when able. Increase activity.   Pt would like samantha D/C'lizzie asap            Point of Contact Update YES  At bedside until 2000               "

## 2023-08-25 NOTE — TREATMENT PLAN
RCAT Treatment Plan    Patient Score: 8  Patient Acuity: 4    Clinical Indication for Therapy: prevent atelectasis    Therapy Ordered: flutter valve    Assessment Summary:   Patient independent and self-administer. IS achieved 1000 and flutter valve done with good efforts.           Juvencio Nicolas RT  8/25/2023

## 2023-08-25 NOTE — PROGRESS NOTES
Charlotte Blair is a 23 year old female with with myxomatous degeneration of the mitral valve, severe mitral regurgitation and heart failure symptoms. Underwent MV repair on 8/24/23 with Dr. Magdaleno.     Charlotte was extubated on the day of surgery.  She was up to a chair this am.  CXR stable.  Will continue to work on pulmonary hygiene and activity today.    The pulmonary service will sign off.  Thank you for allowing us to participate in her cares, and please let our team know if we can be of further assistance.    CALISTA Carlos, CNP  Pulmonary Critical Care  Securely Message Through Vocera Web Console

## 2023-08-25 NOTE — PROGRESS NOTES
"CVTS Daily Progress Note   POD#1 s/p mitral valve repair  Attending: Sharla  LOS: 1    SUBJECTIVE/INTERVAL EVENTS:    Patient arrived to ICU from OR yesterday afternoon. She was subsequently extubated and weaned from pressors. No acute events overnight. Patient progressing well. Maintaining oxygen saturations on 0-2 LPM. Normotensive. Up to chair this AM. Complaining of suboptimal pain control and hard to take a full breath. - BM / flatus. Tolerating sips but having some nausea. UOP adequate. Chest tube output appropriate. Hgb 10.6. Patient denies new chest pain, shortness of breath, abdominal pain, and calf pain. Patient has no questions today.    OBJECTIVE:  Temp:  [96.9  F (36.1  C)-98.6  F (37  C)] 97.9  F (36.6  C)  Pulse:  [76-90] 90  Resp:  [7-39] 36  BP: (135)/(73) 135/73  MAP:  [64 mmHg-107 mmHg] 86 mmHg  Arterial Line BP: ()/(52-91) 101/74  FiO2 (%):  [40 %-60 %] 40 %  SpO2:  [94 %-100 %] 99 %  Vitals:    08/24/23 1300   Weight: 79.1 kg (174 lb 6.1 oz)       Clinically Significant Risk Factors          # Hypocalcemia: Lowest Ca = 7.6 mg/dL in last 2 days, will monitor and replace as appropriate      # Coagulation Defect: INR = 1.39 (Ref range: 0.85 - 1.15) and/or PTT = 42 Seconds (Ref range: 22 - 38 Seconds), will monitor for bleeding  # Thrombocytopenia: Lowest platelets = 127 in last 2 days, will monitor for bleeding          # Obesity: Estimated body mass index is 32.42 kg/m  as calculated from the following:    Height as of 8/8/23: 1.562 m (5' 1.5\").    Weight as of this encounter: 79.1 kg (174 lb 6.1 oz)., PRESENT ON ADMISSION           Current Medications:    Scheduled Meds:   acetaminophen  975 mg Oral Q8H    aspirin  162 mg Oral or NG Tube Daily    ceFAZolin  1 g Intravenous Q8H    heparin ANTICOAGULANT  5,000 Units Subcutaneous Q8H    [START ON 8/26/2023] lidocaine  2 patch Transdermal Q24H    magnesium oxide  400 mg Oral 4x Daily    methocarbamol  750 mg Oral 4x Daily    norgestim-eth " estrad triphasic  1 tablet Oral QPM    pantoprazole  40 mg Oral or NG Tube Daily    Or    pantoprazole  40 mg Oral Daily    polyethylene glycol  17 g Oral Daily    senna-docusate  1 tablet Oral BID     Continuous Infusions:   phenylephrine       PRN Meds:.[START ON 9/3/2023] acetaminophen, bisacodyl, calcium gluconate, calcium gluconate, calcium gluconate, glucose **OR** dextrose **OR** glucagon, hydrALAZINE, HYDROmorphone **OR** HYDROmorphone, ketorolac, lactated ringers, magnesium hydroxide, naloxone **OR** naloxone **OR** naloxone **OR** naloxone, ondansetron **OR** ondansetron, oxyCODONE **OR** oxyCODONE, phenylephrine, prochlorperazine **OR** prochlorperazine    Cardiographics:    Telemetry monitoring demonstrates SR with rates in the 70-80s per my personal review.    Imaging:  Results for orders placed or performed during the hospital encounter of 08/24/23   XR Chest Port 1 View    Impression    IMPRESSION: Poststernotomy changes with mediastinal and left pleural chest tubes with interval mitral valve repair. Endotracheal tube is in the mid trachea, 4 cm from the amy. Right IJ cordis with central line overlying the proximal SVC.    No pneumothorax. Left lower lobe atelectasis and trace effusion noted. Right lung is clear. Heart and pulmonary vascularity are normal.   XR Chest Port 1 View    Impression    IMPRESSION: Poststernotomy changes with mediastinal and left pleural chest tubes. Mitral valve repair. Endotracheal tube has been removed. Right IJ cordis with central line overlying the proximal SVC.  No pneumothorax. Left lower lobe atelectasis and   trace effusion noted. Right lung is clear. Heart is borderline enlarged and there is mild central pulmonary vascular congestion, perhaps slightly worsened. No acute osseous abnormality.       Labs, personally reviewed.  Hemoglobin   Date Value Ref Range Status   08/25/2023 10.0 (L) 11.7 - 15.7 g/dL Final   08/24/2023 11.0 (L) 11.7 - 15.7 g/dL Final    08/24/2023 8.6 (L) 11.7 - 15.7 g/dL Final     Hemoglobin POCT   Date Value Ref Range Status   08/24/2023 9.0 (L) 11.7 - 15.7 g/dL Final   08/24/2023 9.2 (L) 11.7 - 15.7 g/dL Final   08/24/2023 9.3 (L) 11.7 - 15.7 g/dL Final     WBC Count   Date Value Ref Range Status   08/25/2023 10.6 4.0 - 11.0 10e3/uL Final   08/24/2023 13.2 (H) 4.0 - 11.0 10e3/uL Final   08/24/2023 9.3 4.0 - 11.0 10e3/uL Final     Platelet Count   Date Value Ref Range Status   08/25/2023 127 (L) 150 - 450 10e3/uL Final   08/24/2023 158 150 - 450 10e3/uL Final   08/24/2023 139 (L) 150 - 450 10e3/uL Final     Creatinine   Date Value Ref Range Status   08/25/2023 0.51 0.51 - 0.95 mg/dL Final   08/24/2023 0.50 (L) 0.51 - 0.95 mg/dL Final   08/24/2023 0.64 0.51 - 0.95 mg/dL Final     Potassium   Date Value Ref Range Status   08/25/2023 3.6 3.4 - 5.3 mmol/L Final   08/25/2023 3.7 3.4 - 5.3 mmol/L Final   08/24/2023 3.6 3.4 - 5.3 mmol/L Final   01/14/2022 4.5 3.5 - 5.0 mmol/L Final     Potassium POCT   Date Value Ref Range Status   08/24/2023 4.0 3.5 - 5.0 mmol/L Final   08/24/2023 4.3 3.5 - 5.0 mmol/L Final   08/24/2023 4.7 3.5 - 5.0 mmol/L Final     Magnesium   Date Value Ref Range Status   08/25/2023 1.9 1.7 - 2.3 mg/dL Final   08/24/2023 3.4 (H) 1.7 - 2.3 mg/dL Final   08/16/2023 2.1 1.7 - 2.3 mg/dL Final        I/O:  I/O last 3 completed shifts:  In: 7325.57 [P.O.:620; I.V.:3555.57; Other:500; IV Piggyback:2150]  Out: 4155 [Urine:2565; Emesis/NG output:700; Blood:500; Chest Tube:390]       Physical Exam:    General: Patient seen up in chair, NAD. Conversant, pleasant, calm, cooperative on exam.   HEENT: PER, no sclera icterus, moist mucosa, no tracheal deviation  CV: RRR on monitor. 2+ peripheral pulses in all extremities. Trace LE edema.   Pulm: Non-labored effort on RA. Chest tubes in place, serosanguinous output, no air leak. Incision C/D/I.  Abd: Soft, NT, ND  : Robbins with danette urine  Ext: Mild pedal edema, SCDs in place, warm  Neuro: CNs  grossly intact, SANDOVAL, face symmetric, speech clear      ASSESSMENT/PLAN:    Charlotte Blair is a 23 year old female with a history of mitral regurgitation who is POD#1 s/p mitral valve repair.    Principal Problem:    Mitral regurgitation      NEURO:   - Scheduled Tylenol/lidocaine patches and PRN Tylenol/oxycodone/dilaudid for pain, methocarbamol added    CV:   - Normotensive  - Metoprolol 12.5 mg two times a day with hold parameters  - ASA 81mg  - Chest tubes to remain today    PULM:   - Maintaining oxygen saturations on RA  - Encourage pulmonary toilet    FEN/GI:  - Continue electrolyte replacement protocol  - Diet: ADAT   - Bowel regimen    RENAL:  - Adequate UOP/hr  - Cr WNL  - D/C Robbins  - 20mg PO Lasix x1, reassess daily     HEME:  - Acute blood loss anemia post-op  - Hgb stable, no bleeding concerns. Hep SQ, ASA    ID:  - Complete franky op ppx per protocol, afebrile . No concerns for infection    ENDO:   - Discontinue insulin gtt.   - Transition to SSI    PPx:   - DVT: SCDs, SQ heparin TID, OOB/ambulation   - GI: Protonix 40mg PO daily    DISPO:   - Transfer to general telemetry status      Patient discussed with Dr. Magdaleno.        Victoriano Pacheco, CNP, ACNPC-  Cardiothoracic Surgery  [employer] Pager [phone]

## 2023-08-25 NOTE — TREATMENT PLAN
RCAT Treatment Plan    Patient Score: 8  Patient Acuity: 4    Clinical Indication for Therapy: prevent atelectasis    Therapy Ordered: flutter valve Q4H, Q2H IS    Assessment Summary: Patient is alert/oriented and currently on 2 lpm O2; sats mid 90s. Shallow breathing noted. Deep breath and coughing encouraged. RT following.    Franky Barrios, RT  8/24/2023

## 2023-08-25 NOTE — PLAN OF CARE
Goal Outcome Evaluation:         [facility] - ICU    RN Progress Note:             Pertinent Assessments:      Please refer to flowsheet rows for full assessment     Arrived post mitral valve repair at 1230. Phenylephrine off 1330. Extubated 1522. VSS           Key Events - This Shift:       Extubated and off pressors.  VSS.  Pain well controlled with dilaudid and toradol.          WENCESLAO SAT (Sedation Awakening Trial): For use ONLY if intubated    SAT Safety Screen PASSED   If FAILED why?    SAT Performed PASSED   If FAILED why?               Barriers to Discharge / Downgrade:     POD 0 post CV surgery.          Point of Contact Update YES-OR-NO: Yes  If No, reason: Family at bedside.

## 2023-08-26 ENCOUNTER — APPOINTMENT (OUTPATIENT)
Dept: OCCUPATIONAL THERAPY | Facility: HOSPITAL | Age: 23
DRG: 220 | End: 2023-08-26
Attending: SURGERY
Payer: COMMERCIAL

## 2023-08-26 LAB
CALCIUM, IONIZED MEASURED: 1.1 MMOL/L (ref 1.11–1.3)
GLUCOSE BLDC GLUCOMTR-MCNC: 106 MG/DL (ref 70–99)
HOLD SPECIMEN: NORMAL
ION CA PH 7.4: 1.1 MMOL/L (ref 1.11–1.3)
MAGNESIUM SERPL-MCNC: 1.9 MG/DL (ref 1.7–2.3)
PH: 7.39 (ref 7.35–7.45)
PHOSPHATE SERPL-MCNC: 1.8 MG/DL (ref 2.5–4.5)
POTASSIUM SERPL-SCNC: 3.9 MMOL/L (ref 3.4–5.3)

## 2023-08-26 PROCEDURE — 250N000011 HC RX IP 250 OP 636: Mod: JZ | Performed by: SURGERY

## 2023-08-26 PROCEDURE — 250N000011 HC RX IP 250 OP 636: Performed by: SURGERY

## 2023-08-26 PROCEDURE — 97110 THERAPEUTIC EXERCISES: CPT | Mod: GO

## 2023-08-26 PROCEDURE — 84100 ASSAY OF PHOSPHORUS: CPT | Performed by: SURGERY

## 2023-08-26 PROCEDURE — 83735 ASSAY OF MAGNESIUM: CPT | Performed by: SURGERY

## 2023-08-26 PROCEDURE — 250N000013 HC RX MED GY IP 250 OP 250 PS 637: Performed by: SURGERY

## 2023-08-26 PROCEDURE — 82330 ASSAY OF CALCIUM: CPT | Performed by: SURGERY

## 2023-08-26 PROCEDURE — 36415 COLL VENOUS BLD VENIPUNCTURE: CPT | Performed by: SURGERY

## 2023-08-26 PROCEDURE — 84132 ASSAY OF SERUM POTASSIUM: CPT | Performed by: SURGERY

## 2023-08-26 PROCEDURE — 200N000001 HC R&B ICU

## 2023-08-26 PROCEDURE — 97535 SELF CARE MNGMENT TRAINING: CPT | Mod: GO

## 2023-08-26 RX ORDER — HYDROMORPHONE HCL IN WATER/PF 6 MG/30 ML
0.4 PATIENT CONTROLLED ANALGESIA SYRINGE INTRAVENOUS
Status: DISCONTINUED | OUTPATIENT
Start: 2023-08-26 | End: 2023-08-26

## 2023-08-26 RX ORDER — ACETAMINOPHEN 325 MG/1
975 TABLET ORAL 4 TIMES DAILY
Status: DISCONTINUED | OUTPATIENT
Start: 2023-08-26 | End: 2023-08-28 | Stop reason: HOSPADM

## 2023-08-26 RX ORDER — HYDROMORPHONE HCL IN WATER/PF 6 MG/30 ML
0.2 PATIENT CONTROLLED ANALGESIA SYRINGE INTRAVENOUS EVERY 4 HOURS PRN
Status: DISCONTINUED | OUTPATIENT
Start: 2023-08-26 | End: 2023-08-27

## 2023-08-26 RX ORDER — METHOCARBAMOL 500 MG/1
1000 TABLET, FILM COATED ORAL 4 TIMES DAILY
Status: DISCONTINUED | OUTPATIENT
Start: 2023-08-26 | End: 2023-08-28

## 2023-08-26 RX ORDER — METOPROLOL TARTRATE 25 MG/1
25 TABLET, FILM COATED ORAL 2 TIMES DAILY
Status: DISCONTINUED | OUTPATIENT
Start: 2023-08-26 | End: 2023-08-28

## 2023-08-26 RX ORDER — FUROSEMIDE 20 MG
20 TABLET ORAL
Status: DISCONTINUED | OUTPATIENT
Start: 2023-08-26 | End: 2023-08-27

## 2023-08-26 RX ORDER — MAGNESIUM OXIDE 400 MG/1
400 TABLET ORAL EVERY 4 HOURS
Status: COMPLETED | OUTPATIENT
Start: 2023-08-26 | End: 2023-08-27

## 2023-08-26 RX ORDER — HYDROMORPHONE HCL IN WATER/PF 6 MG/30 ML
0.2 PATIENT CONTROLLED ANALGESIA SYRINGE INTRAVENOUS
Status: DISCONTINUED | OUTPATIENT
Start: 2023-08-26 | End: 2023-08-26

## 2023-08-26 RX ADMIN — OXYCODONE HYDROCHLORIDE 10 MG: 5 TABLET ORAL at 10:57

## 2023-08-26 RX ADMIN — OXYCODONE HYDROCHLORIDE 10 MG: 5 TABLET ORAL at 19:11

## 2023-08-26 RX ADMIN — FUROSEMIDE 20 MG: 20 TABLET ORAL at 09:33

## 2023-08-26 RX ADMIN — FUROSEMIDE 20 MG: 20 TABLET ORAL at 17:18

## 2023-08-26 RX ADMIN — HYDROMORPHONE HYDROCHLORIDE 0.2 MG: 0.2 INJECTION, SOLUTION INTRAMUSCULAR; INTRAVENOUS; SUBCUTANEOUS at 19:37

## 2023-08-26 RX ADMIN — METOPROLOL TARTRATE 25 MG: 25 TABLET, FILM COATED ORAL at 23:05

## 2023-08-26 RX ADMIN — OXYCODONE HYDROCHLORIDE 5 MG: 5 TABLET ORAL at 07:28

## 2023-08-26 RX ADMIN — POTASSIUM & SODIUM PHOSPHATES POWDER PACK 280-160-250 MG 2 PACKET: 280-160-250 PACK at 17:17

## 2023-08-26 RX ADMIN — HYDROMORPHONE HYDROCHLORIDE 0.2 MG: 0.2 INJECTION, SOLUTION INTRAMUSCULAR; INTRAVENOUS; SUBCUTANEOUS at 08:08

## 2023-08-26 RX ADMIN — ACETAMINOPHEN 975 MG: 325 TABLET ORAL at 06:33

## 2023-08-26 RX ADMIN — OXYCODONE HYDROCHLORIDE 10 MG: 5 TABLET ORAL at 15:00

## 2023-08-26 RX ADMIN — METHOCARBAMOL 1000 MG: 500 TABLET, FILM COATED ORAL at 23:09

## 2023-08-26 RX ADMIN — MAGNESIUM OXIDE TAB 400 MG (241.3 MG ELEMENTAL MG) 400 MG: 400 (241.3 MG) TAB at 23:07

## 2023-08-26 RX ADMIN — SENNOSIDES AND DOCUSATE SODIUM 1 TABLET: 50; 8.6 TABLET ORAL at 08:41

## 2023-08-26 RX ADMIN — PANTOPRAZOLE SODIUM 40 MG: 40 TABLET, DELAYED RELEASE ORAL at 08:41

## 2023-08-26 RX ADMIN — LIDOCAINE 2 PATCH: 4 PATCH TOPICAL at 07:39

## 2023-08-26 RX ADMIN — KETOROLAC TROMETHAMINE 30 MG: 30 INJECTION, SOLUTION INTRAMUSCULAR; INTRAVENOUS at 15:23

## 2023-08-26 RX ADMIN — METHOCARBAMOL 1000 MG: 500 TABLET, FILM COATED ORAL at 13:07

## 2023-08-26 RX ADMIN — SENNOSIDES AND DOCUSATE SODIUM 1 TABLET: 50; 8.6 TABLET ORAL at 23:05

## 2023-08-26 RX ADMIN — HYDROMORPHONE HYDROCHLORIDE 0.2 MG: 0.2 INJECTION, SOLUTION INTRAMUSCULAR; INTRAVENOUS; SUBCUTANEOUS at 07:40

## 2023-08-26 RX ADMIN — METHOCARBAMOL 1000 MG: 500 TABLET, FILM COATED ORAL at 09:30

## 2023-08-26 RX ADMIN — KETOROLAC TROMETHAMINE 30 MG: 30 INJECTION, SOLUTION INTRAMUSCULAR; INTRAVENOUS at 21:35

## 2023-08-26 RX ADMIN — MAGNESIUM OXIDE TAB 400 MG (241.3 MG ELEMENTAL MG) 400 MG: 400 (241.3 MG) TAB at 08:41

## 2023-08-26 RX ADMIN — HEPARIN SODIUM 5000 UNITS: 10000 INJECTION, SOLUTION INTRAVENOUS; SUBCUTANEOUS at 13:21

## 2023-08-26 RX ADMIN — METOPROLOL TARTRATE 12.5 MG: 25 TABLET, FILM COATED ORAL at 08:41

## 2023-08-26 RX ADMIN — KETOROLAC TROMETHAMINE 30 MG: 30 INJECTION, SOLUTION INTRAMUSCULAR; INTRAVENOUS at 09:25

## 2023-08-26 RX ADMIN — MAGNESIUM OXIDE TAB 400 MG (241.3 MG ELEMENTAL MG) 400 MG: 400 (241.3 MG) TAB at 15:23

## 2023-08-26 RX ADMIN — KETOROLAC TROMETHAMINE 30 MG: 30 INJECTION, SOLUTION INTRAMUSCULAR; INTRAVENOUS at 04:07

## 2023-08-26 RX ADMIN — POTASSIUM & SODIUM PHOSPHATES POWDER PACK 280-160-250 MG 2 PACKET: 280-160-250 PACK at 23:17

## 2023-08-26 RX ADMIN — OXYCODONE HYDROCHLORIDE 5 MG: 5 TABLET ORAL at 06:48

## 2023-08-26 RX ADMIN — METHOCARBAMOL 1000 MG: 500 TABLET, FILM COATED ORAL at 17:17

## 2023-08-26 RX ADMIN — ACETAMINOPHEN 975 MG: 325 TABLET ORAL at 17:17

## 2023-08-26 RX ADMIN — ONDANSETRON 4 MG: 4 TABLET, ORALLY DISINTEGRATING ORAL at 11:27

## 2023-08-26 RX ADMIN — POLYETHYLENE GLYCOL 3350 17 G: 17 POWDER, FOR SOLUTION ORAL at 08:59

## 2023-08-26 RX ADMIN — MAGNESIUM OXIDE TAB 400 MG (241.3 MG ELEMENTAL MG) 400 MG: 400 (241.3 MG) TAB at 17:18

## 2023-08-26 RX ADMIN — ACETAMINOPHEN 975 MG: 325 TABLET ORAL at 23:04

## 2023-08-26 RX ADMIN — OXYCODONE HYDROCHLORIDE 10 MG: 5 TABLET ORAL at 01:25

## 2023-08-26 RX ADMIN — MAGNESIUM OXIDE TAB 400 MG (241.3 MG ELEMENTAL MG) 400 MG: 400 (241.3 MG) TAB at 13:10

## 2023-08-26 RX ADMIN — HYDROMORPHONE HYDROCHLORIDE 0.2 MG: 0.2 INJECTION, SOLUTION INTRAMUSCULAR; INTRAVENOUS; SUBCUTANEOUS at 23:28

## 2023-08-26 RX ADMIN — ASPIRIN 81 MG CHEWABLE TABLET 81 MG: 81 TABLET CHEWABLE at 08:41

## 2023-08-26 RX ADMIN — ONDANSETRON 4 MG: 2 INJECTION INTRAMUSCULAR; INTRAVENOUS at 21:39

## 2023-08-26 RX ADMIN — HEPARIN SODIUM 5000 UNITS: 10000 INJECTION, SOLUTION INTRAVENOUS; SUBCUTANEOUS at 21:42

## 2023-08-26 RX ADMIN — ACETAMINOPHEN 975 MG: 325 TABLET ORAL at 13:10

## 2023-08-26 RX ADMIN — HEPARIN SODIUM 5000 UNITS: 10000 INJECTION, SOLUTION INTRAVENOUS; SUBCUTANEOUS at 06:34

## 2023-08-26 ASSESSMENT — ACTIVITIES OF DAILY LIVING (ADL)
ADLS_ACUITY_SCORE: 26
ADLS_ACUITY_SCORE: 22
ADLS_ACUITY_SCORE: 26

## 2023-08-26 NOTE — PLAN OF CARE
"Patient Name: Charlotte Blair   MRN: [id number]   Date of Admission: 8/24/2023    Procedure: Procedure(s):  Mitral valve repair, left atrial appendage ligation with ANESTHESIA TRANSESOPHAGEAL ECHOCARDIOGRAM    Post Op day #:1 (surgery on 8/24 by Dr Magdaleno)    Subjective (Patient focus/Primary Problem for shift): Pain and nausea          Pain Goal 3 Pain Rating 8           Pain Medication/ Regime effective to reduce patient pain: NOT effective. Pt gets prn oxy10 mg q 4 hr, prn iv ketorolac 30 mg q 6 hr, prn iv dilaudid 0.2-0.4 q 2 hrs, scheduled po tylenol 975 mg q 8hr and po methocarbamol 750 mg qid. Pt continuously says she is 8/10 pain and is intermittently nauseated.    Objective (Physical assessment):           Rhythm: normal sinus rhythm and prolonged QT            Bowel Activity: no if Yes indicate when:           Bowel Medications: yes            Incision: healing well          Incentive Spirometry Q 1-2 hour when awake:  yes. Volume: 750          Epicardial Pacing Wires:  yes, capped            Patient Activity:           Up to chair for meals: yes          Ambulation with RN x2 (Not including CR): yes            Is patient in home clothes:no             Chest Tubes   Pleural: yes Draining: yes               Suction: yes              Mediastinal: yes Draining: yes               Suction: yes   Dressing Change Daily:yes If No, why?                     Urinary Catheter: yes           Preventative WOC consult (need MD order): no       Assessment (Nursing primary shift focus): Pt c/o pain and nausea throughout shift. Pt receives: prn oxy10 mg q 4 hr, prn iv ketorolac 30 mg q 6 hr, prn iv dilaudid 0.2-0.4 q 2 hrs, scheduled po tylenol 975 mg q 8hr and scheduled po methocarbamol 750 mg qid. Pt continuously says she is 8/10 pain and is intermittently nauseated. Currently on a clear liquid diet, drank 100 mL of her 200 mL broth for dinner, appetite poor. Pt stated, \"I feel short of breath\" SpO2 97% on room air, " "applied Nasal cannula 1 L for comfort. She c/o \"this pain and tubes I just want them to go away.\"   Telemetry Reads Normal Sinus Rhythm with Prolonged QT.    Protocols:  K+: 3.7, am recheck, 8/26.  Magnesium: 1.9, am recheck, 8/26.  Phosphorus: 3.9, am recheck, 8/26.  Ionized Calcium: 1.13, am recheck, 8/26.    Plan (Patient Care Plan/focus): Continue to monitor Chest tube output, continue to HIGHLY encourage pt with everything.      Taylor Oro RN   8/25/2023   10:23 PM           Problem: Cardiovascular Surgery  Goal: Optimal Coping with Heart Surgery  Outcome: Not Progressing  Goal: Acceptable Pain Control  Outcome: Not Progressing  Goal: Nausea and Vomiting Relief  Outcome: Not Progressing  Intervention: Prevent or Manage Nausea and Vomiting  Recent Flowsheet Documentation  Taken 8/25/2023 2000 by Taylor Oro RN  Nausea/Vomiting Interventions: sips of clear liquids given  Taken 8/25/2023 1648 by Taylor Oro RN  Nausea/Vomiting Interventions:   antiemetic   sips of clear liquids given  Taken 8/25/2023 1600 by Taylor Oro RN  Nausea/Vomiting Interventions: sips of clear liquids given  Goal: Effective Urinary Elimination  Outcome: Not Progressing     "

## 2023-08-26 NOTE — ANESTHESIA POSTPROCEDURE EVALUATION
Patient: Charlotte Blair    Procedure: Procedure(s):  Mitral valve repair, left atrial appendage ligation with ANESTHESIA TRANSESOPHAGEAL ECHOCARDIOGRAM       Anesthesia Type:  General    Note:  Disposition: ICU   Postop Pain Control: Uneventful            Sign Out: Well controlled pain   PONV: No   Neuro/Psych: Uneventful            Sign Out: Acceptable/Baseline neuro status   Airway/Respiratory: Uneventful            Sign Out: Acceptable/Baseline resp. status   CV/Hemodynamics: Uneventful            Sign Out: Acceptable CV status; No obvious hypovolemia; No obvious fluid overload   Other NRE: NONE   DID A NON-ROUTINE EVENT OCCUR? No           Last vitals:  Vitals:    08/26/23 0400 08/26/23 0500 08/26/23 0600   BP: 106/65 98/56 103/56   Pulse: 89 81 83   Resp:      Temp: 36.9  C (98.5  F)     SpO2: 95% 93% 94%       Electronically Signed By: Gus Burnette MD  August 26, 2023  7:18 AM

## 2023-08-26 NOTE — PROGRESS NOTES
"CVTS Daily Progress Note   POD #2 s/p mitral valve repair  Attending: Sharla  LOS: 2    SUBJECTIVE/INTERVAL EVENTS:    No acute events overnight. Patient progressing well. Maintaining oxygen saturations on RA. Normotensive. Appears to be resting more comfortably today but still somewhat hard to take a full breath. - BM / flatus. Tolerating sips but having some nausea with emesis x1 yesterday. UOP adequate. Chest tube output appropriate.  Patient has no questions today.    OBJECTIVE:  Temp:  [97.5  F (36.4  C)-98.5  F (36.9  C)] 98  F (36.7  C)  Pulse:  [77-92] 92  Resp:  [17-18] 18  BP: ()/(49-71) 122/71  SpO2:  [93 %-99 %] 95 %  Vitals:    08/24/23 1300 08/26/23 0648   Weight: 79.1 kg (174 lb 6.1 oz) 82.2 kg (181 lb 4.8 oz)       Clinically Significant Risk Factors          # Hypocalcemia: Lowest Ca = 7.6 mg/dL in last 2 days, will monitor and replace as appropriate        # Coagulation Defect: INR = 1.39 (Ref range: 0.85 - 1.15) and/or PTT = 42 Seconds (Ref range: 22 - 38 Seconds), will monitor for bleeding    # Thrombocytopenia: Lowest platelets = 127 in last 2 days, will monitor for bleeding          # Obesity: Estimated body mass index is 33.7 kg/m  as calculated from the following:    Height as of 8/8/23: 1.562 m (5' 1.5\").    Weight as of this encounter: 82.2 kg (181 lb 4.8 oz)., PRESENT ON ADMISSION           Current Medications:    Scheduled Meds:   acetaminophen  975 mg Oral 4x Daily    aspirin  81 mg Oral Daily    furosemide  20 mg Oral TID    heparin ANTICOAGULANT  5,000 Units Subcutaneous Q8H    lidocaine  2 patch Transdermal Q24H    magnesium oxide  400 mg Oral 4x Daily    methocarbamol  1,000 mg Oral 4x Daily    metoprolol tartrate  12.5 mg Oral BID    norgestim-eth estrad triphasic  1 tablet Oral QPM    pantoprazole  40 mg Oral Daily    polyethylene glycol  17 g Oral Daily    senna-docusate  1 tablet Oral BID       PRN Meds:.bisacodyl, calcium gluconate, calcium gluconate, calcium " gluconate, glucose **OR** dextrose **OR** glucagon, hydrALAZINE, HYDROmorphone **OR** HYDROmorphone, ketorolac, magnesium hydroxide, naloxone **OR** naloxone **OR** naloxone **OR** naloxone, ondansetron **OR** ondansetron, oxyCODONE **OR** oxyCODONE    Cardiographics:    Telemetry monitoring demonstrates SR with rates in the 80s per my personal review.    Imaging:  Results for orders placed or performed during the hospital encounter of 08/24/23   XR Chest Port 1 View    Impression    IMPRESSION: Poststernotomy changes with mediastinal and left pleural chest tubes with interval mitral valve repair. Endotracheal tube is in the mid trachea, 4 cm from the amy. Right IJ cordis with central line overlying the proximal SVC.    No pneumothorax. Left lower lobe atelectasis and trace effusion noted. Right lung is clear. Heart and pulmonary vascularity are normal.   XR Chest Port 1 View    Impression    IMPRESSION: Poststernotomy changes with mediastinal and left pleural chest tubes. Mitral valve repair. Endotracheal tube has been removed. Right IJ cordis with central line overlying the proximal SVC.  No pneumothorax. Left lower lobe atelectasis and   trace effusion noted. Right lung is clear. Heart is borderline enlarged and there is mild central pulmonary vascular congestion, perhaps slightly worsened. No acute osseous abnormality.       Labs, personally reviewed.  Hemoglobin   Date Value Ref Range Status   08/25/2023 10.0 (L) 11.7 - 15.7 g/dL Final   08/24/2023 11.0 (L) 11.7 - 15.7 g/dL Final   08/24/2023 8.6 (L) 11.7 - 15.7 g/dL Final     Hemoglobin POCT   Date Value Ref Range Status   08/24/2023 9.0 (L) 11.7 - 15.7 g/dL Final   08/24/2023 9.2 (L) 11.7 - 15.7 g/dL Final   08/24/2023 9.3 (L) 11.7 - 15.7 g/dL Final     WBC Count   Date Value Ref Range Status   08/25/2023 10.6 4.0 - 11.0 10e3/uL Final   08/24/2023 13.2 (H) 4.0 - 11.0 10e3/uL Final   08/24/2023 9.3 4.0 - 11.0 10e3/uL Final     Platelet Count   Date Value  Ref Range Status   08/25/2023 127 (L) 150 - 450 10e3/uL Final   08/24/2023 158 150 - 450 10e3/uL Final   08/24/2023 139 (L) 150 - 450 10e3/uL Final     Creatinine   Date Value Ref Range Status   08/25/2023 0.51 0.51 - 0.95 mg/dL Final   08/24/2023 0.50 (L) 0.51 - 0.95 mg/dL Final   08/24/2023 0.64 0.51 - 0.95 mg/dL Final     Potassium   Date Value Ref Range Status   08/26/2023 3.9 3.4 - 5.3 mmol/L Final   08/25/2023 3.6 3.4 - 5.3 mmol/L Final   08/25/2023 3.7 3.4 - 5.3 mmol/L Final   01/14/2022 4.5 3.5 - 5.0 mmol/L Final     Potassium POCT   Date Value Ref Range Status   08/24/2023 4.0 3.5 - 5.0 mmol/L Final   08/24/2023 4.3 3.5 - 5.0 mmol/L Final   08/24/2023 4.7 3.5 - 5.0 mmol/L Final     Magnesium   Date Value Ref Range Status   08/26/2023 1.9 1.7 - 2.3 mg/dL Final   08/25/2023 1.9 1.7 - 2.3 mg/dL Final   08/24/2023 3.4 (H) 1.7 - 2.3 mg/dL Final        I/O:  I/O last 3 completed shifts:  In: 979.9 [P.O.:660; I.V.:319.9]  Out: 1300 [Urine:700; Chest Tube:600]       Physical Exam:    General: Patient seen resting quietly in bed in a darkened room, NAD. Calm, cooperative on exam.   HEENT: PER,  moist mucosa  CV: RRR on monitor. Mild edema.   Pulm: Unlabored effort on RA. Chest tubes in place, serosanguinous output, no air leak. Incision C/D/I.  Abd: Soft, NT, ND  Ext: Mild pedal edema, SCDs in place, warm  Neuro: CNs grossly intact, SANDOVAL, face symmetric, speech clear      ASSESSMENT/PLAN:    Charlotte Blair is a 23 year old female with a history of mitral regurgitation who is POD #2 s/p mitral valve repair.    Principal Problem:    Mitral regurgitation    Prolonged Qtc    Hypervolemia    Acute post-op pain    NEURO:   - Scheduled Tylenol/methocarbamol/lidocaine patches and PRN Tylenol/Toradol/oxycodone/dilaudid for pain    CV:   - Normotensive  - Increase metoprolol to 25 mg two times a day with hold parameters  - ASA 81mg  - Chest tubes to remain today    PULM:   - Maintaining oxygen saturations on RA  - Encourage  pulmonary toilet    FEN/GI:  - Continue electrolyte replacement protocol  - Diet: Regular  - Bowel regimen  - PRN antiemetics available for nausea    RENAL:  - Adequate UOP  - 20mg PO Lasix TID, reassess daily     HEME:  - Acute blood loss anemia post-op  - Hgb stable, no bleeding concerns. Hep SQ, ASA    ID:  - Completed franky op ppx per protocol, afebrile . No concerns for infection    ENDO:   - SSI    PPx:   - DVT: SCDs, SQ heparin TID, OOB/ambulation   - GI: Protonix 40mg PO daily    DISPO:   - General telemetry status  - Anticipated discharge home once medically stable      Patient discussed with Dr. Madison.        Victoriano Pacheco, CNP, ACNPC-AG  Cardiothoracic Surgery  [employer] Pager [phone]

## 2023-08-26 NOTE — PROGRESS NOTES
Care Management Follow Up    Length of Stay (days): 2    Expected Discharge Date: 08/28/2023     Concerns to be Addressed:     Care Progression  Patient plan of care discussed at interdisciplinary rounds: Yes    Anticipated Discharge Disposition:  Home with outpatient cardiac rehab     Anticipated Discharge Services:  NA  Anticipated Discharge DME:  DAREN    Patient/family educated on Medicare website which has current facility and service quality ratings:  NA  Education Provided on the Discharge Plan:  NA  Patient/Family in Agreement with the Plan:  NA    Referrals Placed by CM/SW:  DAREN  Private pay costs discussed: Not applicable    Additional Information:  Chart Reviewed.     Plan for discharge home with outpatient cardiac rehab when medically stable.    Social HX: Patient independent.     CM will continue to follow care progression and aide in discharge planning as needed.     Constanza Marques RN

## 2023-08-26 NOTE — PLAN OF CARE
Problem: Cardiovascular Surgery  Goal: Improved Activity Tolerance  Outcome: Progressing   Goal Outcome Evaluation:      Charlotte worked with cardiac rehab x 2 today. Sat in the chair for 1 hour. Needs encouragement to increase activity.        Problem: Cardiovascular Surgery  Goal: Optimal Coping with Heart Surgery  Outcome: Progressing   Anxious about progression. Difficulty coping with pain after surgery.       Problem: Cardiovascular Surgery  Goal: Effective Bowel Elimination  Outcome: Progressing   Passing gas, no BM yet. Continued intermittent nausea.       Problem: Cardiovascular Surgery  Goal: Effective Urinary Elimination  Outcome: Progressing  Robbins removed. Voiding spontaneously without difficulty.        Currently Cardiac tele can transfer when bed becomes available. Family at bedside throughout the day. Updated on plan of care.

## 2023-08-27 ENCOUNTER — APPOINTMENT (OUTPATIENT)
Dept: OCCUPATIONAL THERAPY | Facility: HOSPITAL | Age: 23
DRG: 220 | End: 2023-08-27
Attending: SURGERY
Payer: COMMERCIAL

## 2023-08-27 LAB
CALCIUM, IONIZED MEASURED: 1.08 MMOL/L (ref 1.11–1.3)
GLUCOSE BLDC GLUCOMTR-MCNC: 109 MG/DL (ref 70–99)
GLUCOSE BLDC GLUCOMTR-MCNC: 99 MG/DL (ref 70–99)
ION CA PH 7.4: 1.09 MMOL/L (ref 1.11–1.3)
MAGNESIUM SERPL-MCNC: 2.1 MG/DL (ref 1.7–2.3)
PH: 7.42 (ref 7.35–7.45)
PHOSPHATE SERPL-MCNC: 1.3 MG/DL (ref 2.5–4.5)
PHOSPHATE SERPL-MCNC: 1.8 MG/DL (ref 2.5–4.5)
POTASSIUM SERPL-SCNC: 3.7 MMOL/L (ref 3.4–5.3)

## 2023-08-27 PROCEDURE — 250N000011 HC RX IP 250 OP 636: Mod: JZ | Performed by: SURGERY

## 2023-08-27 PROCEDURE — 36415 COLL VENOUS BLD VENIPUNCTURE: CPT

## 2023-08-27 PROCEDURE — 84100 ASSAY OF PHOSPHORUS: CPT | Performed by: SURGERY

## 2023-08-27 PROCEDURE — 210N000001 HC R&B IMCU HEART CARE

## 2023-08-27 PROCEDURE — 250N000011 HC RX IP 250 OP 636: Performed by: SURGERY

## 2023-08-27 PROCEDURE — 250N000013 HC RX MED GY IP 250 OP 250 PS 637: Performed by: SURGERY

## 2023-08-27 PROCEDURE — 250N000011 HC RX IP 250 OP 636: Performed by: THORACIC SURGERY (CARDIOTHORACIC VASCULAR SURGERY)

## 2023-08-27 PROCEDURE — 84132 ASSAY OF SERUM POTASSIUM: CPT | Performed by: SURGERY

## 2023-08-27 PROCEDURE — 93010 ELECTROCARDIOGRAM REPORT: CPT | Performed by: INTERNAL MEDICINE

## 2023-08-27 PROCEDURE — 97535 SELF CARE MNGMENT TRAINING: CPT | Mod: GO

## 2023-08-27 PROCEDURE — 93005 ELECTROCARDIOGRAM TRACING: CPT

## 2023-08-27 PROCEDURE — 36415 COLL VENOUS BLD VENIPUNCTURE: CPT | Performed by: SURGERY

## 2023-08-27 PROCEDURE — 83735 ASSAY OF MAGNESIUM: CPT | Performed by: SURGERY

## 2023-08-27 PROCEDURE — 93005 ELECTROCARDIOGRAM TRACING: CPT | Performed by: SURGERY

## 2023-08-27 PROCEDURE — 258N000003 HC RX IP 258 OP 636: Performed by: SURGERY

## 2023-08-27 PROCEDURE — 82330 ASSAY OF CALCIUM: CPT | Performed by: SURGERY

## 2023-08-27 PROCEDURE — 97110 THERAPEUTIC EXERCISES: CPT | Mod: GO

## 2023-08-27 PROCEDURE — 250N000009 HC RX 250: Performed by: SURGERY

## 2023-08-27 RX ORDER — FUROSEMIDE 20 MG
20 TABLET ORAL
Status: DISCONTINUED | OUTPATIENT
Start: 2023-08-27 | End: 2023-08-28 | Stop reason: HOSPADM

## 2023-08-27 RX ORDER — HYDROMORPHONE HCL IN WATER/PF 6 MG/30 ML
0.2 PATIENT CONTROLLED ANALGESIA SYRINGE INTRAVENOUS EVERY 8 HOURS PRN
Status: DISCONTINUED | OUTPATIENT
Start: 2023-08-27 | End: 2023-08-28

## 2023-08-27 RX ORDER — POTASSIUM CHLORIDE 1500 MG/1
20 TABLET, EXTENDED RELEASE ORAL ONCE
Status: COMPLETED | OUTPATIENT
Start: 2023-08-27 | End: 2023-08-27

## 2023-08-27 RX ORDER — ONDANSETRON 2 MG/ML
4 INJECTION INTRAMUSCULAR; INTRAVENOUS EVERY 4 HOURS PRN
Status: DISCONTINUED | OUTPATIENT
Start: 2023-08-27 | End: 2023-08-28 | Stop reason: HOSPADM

## 2023-08-27 RX ORDER — HYDROXYZINE HYDROCHLORIDE 10 MG/1
10-20 TABLET, FILM COATED ORAL 3 TIMES DAILY PRN
Status: DISCONTINUED | OUTPATIENT
Start: 2023-08-27 | End: 2023-08-28 | Stop reason: HOSPADM

## 2023-08-27 RX ORDER — ONDANSETRON 4 MG/1
4 TABLET, ORALLY DISINTEGRATING ORAL EVERY 4 HOURS PRN
Status: DISCONTINUED | OUTPATIENT
Start: 2023-08-27 | End: 2023-08-28 | Stop reason: HOSPADM

## 2023-08-27 RX ADMIN — HEPARIN SODIUM 5000 UNITS: 10000 INJECTION, SOLUTION INTRAVENOUS; SUBCUTANEOUS at 14:56

## 2023-08-27 RX ADMIN — KETOROLAC TROMETHAMINE 30 MG: 30 INJECTION, SOLUTION INTRAMUSCULAR; INTRAVENOUS at 03:15

## 2023-08-27 RX ADMIN — OXYCODONE HYDROCHLORIDE 5 MG: 5 TABLET ORAL at 19:56

## 2023-08-27 RX ADMIN — PROCHLORPERAZINE EDISYLATE 10 MG: 5 INJECTION, SOLUTION INTRAMUSCULAR; INTRAVENOUS at 01:09

## 2023-08-27 RX ADMIN — ACETAMINOPHEN 975 MG: 325 TABLET ORAL at 12:33

## 2023-08-27 RX ADMIN — METHOCARBAMOL 1000 MG: 500 TABLET, FILM COATED ORAL at 08:45

## 2023-08-27 RX ADMIN — LIDOCAINE 2 PATCH: 4 PATCH TOPICAL at 08:46

## 2023-08-27 RX ADMIN — MAGNESIUM OXIDE TAB 400 MG (241.3 MG ELEMENTAL MG) 400 MG: 400 (241.3 MG) TAB at 12:34

## 2023-08-27 RX ADMIN — HYDROXYZINE HYDROCHLORIDE 20 MG: 10 TABLET ORAL at 23:59

## 2023-08-27 RX ADMIN — ACETAMINOPHEN 975 MG: 325 TABLET ORAL at 08:45

## 2023-08-27 RX ADMIN — METHOCARBAMOL 1000 MG: 500 TABLET, FILM COATED ORAL at 12:34

## 2023-08-27 RX ADMIN — OXYCODONE HYDROCHLORIDE 5 MG: 5 TABLET ORAL at 15:45

## 2023-08-27 RX ADMIN — HYDROXYZINE HYDROCHLORIDE 10 MG: 10 TABLET ORAL at 15:45

## 2023-08-27 RX ADMIN — SENNOSIDES AND DOCUSATE SODIUM 1 TABLET: 50; 8.6 TABLET ORAL at 20:25

## 2023-08-27 RX ADMIN — HEPARIN SODIUM 5000 UNITS: 10000 INJECTION, SOLUTION INTRAVENOUS; SUBCUTANEOUS at 06:25

## 2023-08-27 RX ADMIN — POTASSIUM CHLORIDE 20 MEQ: 1500 TABLET, EXTENDED RELEASE ORAL at 08:46

## 2023-08-27 RX ADMIN — HEPARIN SODIUM 5000 UNITS: 10000 INJECTION, SOLUTION INTRAVENOUS; SUBCUTANEOUS at 21:41

## 2023-08-27 RX ADMIN — ACETAMINOPHEN 975 MG: 325 TABLET ORAL at 21:50

## 2023-08-27 RX ADMIN — MAGNESIUM OXIDE TAB 400 MG (241.3 MG ELEMENTAL MG) 400 MG: 400 (241.3 MG) TAB at 08:45

## 2023-08-27 RX ADMIN — MAGNESIUM OXIDE TAB 400 MG (241.3 MG ELEMENTAL MG) 400 MG: 400 (241.3 MG) TAB at 17:09

## 2023-08-27 RX ADMIN — HYDROXYZINE HYDROCHLORIDE 10 MG: 10 TABLET ORAL at 11:15

## 2023-08-27 RX ADMIN — SENNOSIDES AND DOCUSATE SODIUM 1 TABLET: 50; 8.6 TABLET ORAL at 08:45

## 2023-08-27 RX ADMIN — ACETAMINOPHEN 975 MG: 325 TABLET ORAL at 17:09

## 2023-08-27 RX ADMIN — PANTOPRAZOLE SODIUM 40 MG: 40 TABLET, DELAYED RELEASE ORAL at 08:46

## 2023-08-27 RX ADMIN — POLYETHYLENE GLYCOL 3350 17 G: 17 POWDER, FOR SOLUTION ORAL at 08:46

## 2023-08-27 RX ADMIN — ASPIRIN 81 MG CHEWABLE TABLET 81 MG: 81 TABLET CHEWABLE at 08:45

## 2023-08-27 RX ADMIN — METOPROLOL TARTRATE 25 MG: 25 TABLET, FILM COATED ORAL at 20:25

## 2023-08-27 RX ADMIN — METHOCARBAMOL 1000 MG: 500 TABLET, FILM COATED ORAL at 17:11

## 2023-08-27 RX ADMIN — MAGNESIUM OXIDE TAB 400 MG (241.3 MG ELEMENTAL MG) 400 MG: 400 (241.3 MG) TAB at 20:24

## 2023-08-27 RX ADMIN — OXYCODONE HYDROCHLORIDE 10 MG: 5 TABLET ORAL at 06:25

## 2023-08-27 RX ADMIN — METOPROLOL TARTRATE 25 MG: 25 TABLET, FILM COATED ORAL at 08:45

## 2023-08-27 RX ADMIN — FUROSEMIDE 20 MG: 20 TABLET ORAL at 08:46

## 2023-08-27 RX ADMIN — HYDROMORPHONE HYDROCHLORIDE 0.2 MG: 0.2 INJECTION, SOLUTION INTRAMUSCULAR; INTRAVENOUS; SUBCUTANEOUS at 03:56

## 2023-08-27 RX ADMIN — POTASSIUM & SODIUM PHOSPHATES POWDER PACK 280-160-250 MG 2 PACKET: 280-160-250 PACK at 18:48

## 2023-08-27 RX ADMIN — CALCIUM GLUCONATE 1 G: 20 INJECTION, SOLUTION INTRAVENOUS at 08:46

## 2023-08-27 RX ADMIN — SODIUM PHOSPHATE, MONOBASIC, MONOHYDRATE AND SODIUM PHOSPHATE, DIBASIC, ANHYDROUS 9 MMOL: 142; 276 INJECTION, SOLUTION INTRAVENOUS at 12:33

## 2023-08-27 RX ADMIN — OXYCODONE HYDROCHLORIDE 10 MG: 5 TABLET ORAL at 11:15

## 2023-08-27 RX ADMIN — METHOCARBAMOL 1000 MG: 500 TABLET, FILM COATED ORAL at 20:25

## 2023-08-27 RX ADMIN — FUROSEMIDE 20 MG: 20 TABLET ORAL at 17:09

## 2023-08-27 RX ADMIN — SODIUM PHOSPHATE, MONOBASIC, MONOHYDRATE AND SODIUM PHOSPHATE, DIBASIC, ANHYDROUS 9 MMOL: 142; 276 INJECTION, SOLUTION INTRAVENOUS at 10:27

## 2023-08-27 ASSESSMENT — ACTIVITIES OF DAILY LIVING (ADL)
ADLS_ACUITY_SCORE: 22

## 2023-08-27 NOTE — PLAN OF CARE
Problem: Cardiovascular Surgery  Goal: Improved Activity Tolerance  Outcome: Progressing  Goal: Acceptable Pain Control  Outcome: Progressing  Intervention: Prevent or Manage Pain  Recent Flowsheet Documentation  Taken 8/27/2023 1233 by Sharifa Cruz RN  Pain Management Interventions: medication (see MAR)  Taken 8/27/2023 1115 by Sharifa Cruz RN  Pain Management Interventions: medication (see MAR)  Taken 8/27/2023 0845 by Sharifa Cruz RN  Pain Management Interventions: medication (see MAR)  Goal: Nausea and Vomiting Relief  Outcome: Progressing  Goal: Effective Urinary Elimination  Outcome: Progressing   Goal Outcome Evaluation:     Patient doing well this shift. Started shift asking for more pain medications but had just received 10 mg Oxy @ 0625. Explained that it was too soon but scheduled Tylenol, Robaxin, and Lidocaine patches were given. Patient was told that if she was off the IV pain medications that she could possibly be discharged tomorrow or the next day so that is her goal for now.    Chest tubes and pacer wires were removed by CV Sx today ~ 1100. Patient states that she feels much better without them. MD also added PRN Hydroxyzine which was given to reduce her anxiety. Seemed to work well. She had visitors today which lifted her spirits. She was encouraged by her family to order meal tray. She ate ~ 1/3 of bagel w/cream cheese and cup of pears.

## 2023-08-27 NOTE — PLAN OF CARE
sc  Patient Name: Charlotte Blair   MRN: [id number]   Date of Admission: 8/24/2023    Procedure: Procedure(s):  Mitral valve repair, left atrial appendage ligation with ANESTHESIA TRANSESOPHAGEAL ECHOCARDIOGRAM    Post Op day #:3    Subjective (Patient focus/Primary Problem for shift): PAIN CONTROL          Pain Goal0 Pain Rating8           Pain Medication/ Regime effective to reduce patient pain NO    Objective (Physical assessment):           Rhythm: normal sinus rhythm            Bowel Activity: yes if Yes indicate when: TODAY          Bowel Medications: yes            Incision: healing well          Incentive Spirometry Q 1-2 hour when awake:  yes           Epicardial Pacing Wires:  no            Patient Activity:           Up to chair for meals: yes          Ambulation with RN x2 (Not including CR): yes            Is patient in home clothes:no             Chest Tubes   Pleural: no Draining: no               Suction: no              Mediastinal: no Draining: no               Suction: no   Dressing Change Daily:no If No, why? DRESSING REMOVED TOMORROW FOR SHOWER                     Urinary Catheter: no           Preventative WOC consult (need MD order): no       Assessment (Nursing primary shift focus): PAIN CONTROL. WALK IN [place].    Plan (Patient Care Plan/focus): No more IV pain meds. If pt doesn't require any more IV pain meds today, she could possibly discharge home tomorrow.     A&O x4. The pt is still c/o 6-8/10 pain even after having pain medications. She can make her needs known. Up independently in room.         Tiffani Rosenthal RN   8/27/2023   4:47 PM

## 2023-08-27 NOTE — PROGRESS NOTES
"CVTS Daily Progress Note   POD #3 s/p mitral valve repair  Attending: Sharla  LOS: 3    SUBJECTIVE/INTERVAL EVENTS:    No acute events overnight, transferred out of ICU. Patient progressing well. Maintaining oxygen saturations on RA. Normotensive. Pain adequately controlled this AM but did use IV Dilaudid overnight. + BM. Tolerating PO intake but low appetite. UOP adequate. Chest tube output appropriate for removal today.  Patient has no questions today; nervous but eager to have chest tubes removed.    OBJECTIVE:  Temp:  [98  F (36.7  C)-98.7  F (37.1  C)] 98.2  F (36.8  C)  Pulse:  [77-96] 82  Resp:  [16-20] 18  BP: (115-140)/(63-99) 118/72  SpO2:  [94 %-96 %] 94 %  Vitals:    08/24/23 1300 08/26/23 0648 08/27/23 0618   Weight: 79.1 kg (174 lb 6.1 oz) 82.2 kg (181 lb 4.8 oz) 80.7 kg (178 lb)       Clinically Significant Risk Factors                             # Obesity: Estimated body mass index is 33.09 kg/m  as calculated from the following:    Height as of 8/8/23: 1.562 m (5' 1.5\").    Weight as of this encounter: 80.7 kg (178 lb)., PRESENT ON ADMISSION           Current Medications:    Scheduled Meds:   acetaminophen  975 mg Oral 4x Daily    aspirin  81 mg Oral Daily    furosemide  20 mg Oral BID    heparin ANTICOAGULANT  5,000 Units Subcutaneous Q8H    lidocaine  2 patch Transdermal Q24H    magnesium oxide  400 mg Oral 4x Daily    methocarbamol  1,000 mg Oral 4x Daily    metoprolol tartrate  25 mg Oral BID    norgestim-eth estrad triphasic  1 tablet Oral QPM    pantoprazole  40 mg Oral Daily    polyethylene glycol  17 g Oral Daily    senna-docusate  1 tablet Oral BID    sodium phosphate  9 mmol Intravenous Q2H       PRN Meds:.bisacodyl, calcium gluconate, calcium gluconate, glucose **OR** dextrose **OR** glucagon, hydrALAZINE, HYDROmorphone **OR** [DISCONTINUED] HYDROmorphone, hydrOXYzine, ketorolac, magnesium hydroxide, naloxone **OR** naloxone **OR** naloxone **OR** naloxone, ondansetron **OR** " ondansetron, oxyCODONE **OR** oxyCODONE    Cardiographics:    Telemetry monitoring demonstrates SR with rates in the 80s per my personal review.    Imaging:  Results for orders placed or performed during the hospital encounter of 08/24/23   XR Chest Port 1 View    Impression    IMPRESSION: Poststernotomy changes with mediastinal and left pleural chest tubes with interval mitral valve repair. Endotracheal tube is in the mid trachea, 4 cm from the amy. Right IJ cordis with central line overlying the proximal SVC.    No pneumothorax. Left lower lobe atelectasis and trace effusion noted. Right lung is clear. Heart and pulmonary vascularity are normal.   XR Chest Port 1 View    Impression    IMPRESSION: Poststernotomy changes with mediastinal and left pleural chest tubes. Mitral valve repair. Endotracheal tube has been removed. Right IJ cordis with central line overlying the proximal SVC.  No pneumothorax. Left lower lobe atelectasis and   trace effusion noted. Right lung is clear. Heart is borderline enlarged and there is mild central pulmonary vascular congestion, perhaps slightly worsened. No acute osseous abnormality.       Labs, personally reviewed.  Hemoglobin   Date Value Ref Range Status   08/25/2023 10.0 (L) 11.7 - 15.7 g/dL Final   08/24/2023 11.0 (L) 11.7 - 15.7 g/dL Final   08/24/2023 8.6 (L) 11.7 - 15.7 g/dL Final     Hemoglobin POCT   Date Value Ref Range Status   08/24/2023 9.0 (L) 11.7 - 15.7 g/dL Final   08/24/2023 9.2 (L) 11.7 - 15.7 g/dL Final   08/24/2023 9.3 (L) 11.7 - 15.7 g/dL Final     WBC Count   Date Value Ref Range Status   08/25/2023 10.6 4.0 - 11.0 10e3/uL Final   08/24/2023 13.2 (H) 4.0 - 11.0 10e3/uL Final   08/24/2023 9.3 4.0 - 11.0 10e3/uL Final     Platelet Count   Date Value Ref Range Status   08/25/2023 127 (L) 150 - 450 10e3/uL Final   08/24/2023 158 150 - 450 10e3/uL Final   08/24/2023 139 (L) 150 - 450 10e3/uL Final     Creatinine   Date Value Ref Range Status   08/25/2023 0.51  0.51 - 0.95 mg/dL Final   08/24/2023 0.50 (L) 0.51 - 0.95 mg/dL Final   08/24/2023 0.64 0.51 - 0.95 mg/dL Final     Potassium   Date Value Ref Range Status   08/27/2023 3.7 3.4 - 5.3 mmol/L Final   08/26/2023 3.9 3.4 - 5.3 mmol/L Final   08/25/2023 3.6 3.4 - 5.3 mmol/L Final   08/25/2023 3.7 3.4 - 5.3 mmol/L Final   01/14/2022 4.5 3.5 - 5.0 mmol/L Final     Potassium POCT   Date Value Ref Range Status   08/24/2023 4.0 3.5 - 5.0 mmol/L Final   08/24/2023 4.3 3.5 - 5.0 mmol/L Final   08/24/2023 4.7 3.5 - 5.0 mmol/L Final     Magnesium   Date Value Ref Range Status   08/27/2023 2.1 1.7 - 2.3 mg/dL Final   08/26/2023 1.9 1.7 - 2.3 mg/dL Final   08/25/2023 1.9 1.7 - 2.3 mg/dL Final        I/O:  I/O last 3 completed shifts:  In: 730 [P.O.:720; I.V.:10]  Out: 1170 [Urine:800; Chest Tube:370]       Physical Exam:    General: Patient seen resting in bed, visiting with family, NAD. Pleasant, calm, cooperative on exam.   HEENT: PER, EOMI, moist mucosa  CV: RRR on monitor. Trace LE edema.   Pulm: Unlabored effort on RA, CTA. Chest tubes in place, serosanguinous output, no air leak. Incision C/D/I.  Abd: Soft, NT, ND, active BS  Ext: Trace edema, SANDOVAL, 5/5 strength throughout, warm  Neuro: CNs grossly intact, face symmetric, speech clear      ASSESSMENT/PLAN:    Charlotte Blair is a 23 year old female with a history of mitral regurgitation who is POD #3 s/p mitral valve repair.    Principal Problem:    Mitral regurgitation    Prolonged QTc    Hypervolemia    Acute post-op pain    NEURO:   - Scheduled Tylenol/methocarbamol/lidocaine patches and PRN Tylenol/Toradol/oxycodone/dilaudid for pain; Atarax added today    CV:   - Normotensive  - Metoprolol 25 mg two times a day with hold parameters  - ASA 81mg  - Will remove chest tubes & TPW today    PULM:   - Maintaining oxygen saturations on RA  - Encourage pulmonary toilet    FEN/GI:  - Continue electrolyte replacement protocol  - Diet: Regular  - Bowel regimen, having antegrade  function  - PRN antiemetics available for nausea    RENAL:  - Adequate UOP  - 20mg PO Lasix BID, reassess daily - expect this can likely be discontinued as she reaches euvolemia    HEME:  - Acute blood loss anemia post-op  - Hgb stable, no bleeding concerns. Hep SQ, ASA    ID:  - Completed franky op ppx per protocol, afebrile . No concerns for infection    ENDO:   - sliding scale insulin discontinued as has been euglycemic without need for supplemental insulin    PPx:   - DVT: SCDs, SQ heparin TID, OOB/ambulation   - GI: Protonix 40mg PO daily    DISPO:   - General telemetry status  - Anticipated discharge home - likely in the next 24-48 hrs      Patient discussed with Dr. Madison.        Victoriano Pacheco, CNP, ACN-AG  Cardiothoracic Surgery  [employer] Pager [phone]

## 2023-08-27 NOTE — PLAN OF CARE
Goal Outcome Evaluation:               sc  Patient Name: Charlotte Blair   MRN: [id number]   Date of Admission: 8/24/2023    Procedure: Procedure(s):  Mitral valve repair, left atrial appendage ligation with ANESTHESIA TRANSESOPHAGEAL ECHOCARDIOGRAM    Post Op day #:3    Subjective (Patient focus/Primary Problem for shift): settle in to new room.          Pain Goal0 Pain [id number]           Pain Medication/ Regime effective to reduce patient pain IV dilaudid.  Received IV Toradol just prior to transfer.    Objective (Physical assessment):           Rhythm: normal sinus rhythm first degree AVB            Bowel Activity: Yes, had small bm if Yes indicate when: 8/27 0630          Bowel Medications: yes            Incision: healing well          Incentive Spirometry Q 1-2 hour when awake:  yes Volume: 600          Epicardial Pacing Wires:  yes capped            Patient Activity:           Up to chair for meals: yes          Ambulation with RN x2 (Not including CR): not applicable            Is patient in home clothes:no             Chest Tubes   Pleural: yes Draining: yes               Suction: yes              Mediastinal: yes Draining: yes               Suction: yes   Dressing Change Daily:yes If No, why?n/a                     Urinary Catheter: no           Preventative WOC consult (need MD order): no       Assessment (Nursing primary shift focus): Patient was anxious and agitated being moved to new room during the night.  Staff settled patient in bed, repositioned multiple times to get her comfortable.  Chest tubes are to suction.  IV dilaudid given for chest pain at chest tube sites. Pain decreased from 10/10 to 8/10, then patient was later able to get to sleep.   Lung sounds diminished.  Continuous pulse oximeter in place, able to have adequate oxygen saturation on room air.      Plan (Patient Care Plan/focus): Plan to encourage patient to sit in recliner during day.  Must have PICC draw lab work this morning,  multiple phlebotomist attempted but were unable, and patient limits area she is willing to allow them to try.  Encourage further use of incentive spirometer.      Desire Conley RN   8/27/2023   5:33 AM    Patient's mother called and updated regarding room change to 326 on P3 at 7:47 am.

## 2023-08-28 ENCOUNTER — APPOINTMENT (OUTPATIENT)
Dept: CARDIOLOGY | Facility: HOSPITAL | Age: 23
DRG: 220 | End: 2023-08-28
Attending: PHYSICIAN ASSISTANT
Payer: COMMERCIAL

## 2023-08-28 ENCOUNTER — APPOINTMENT (OUTPATIENT)
Dept: OCCUPATIONAL THERAPY | Facility: HOSPITAL | Age: 23
DRG: 220 | End: 2023-08-28
Attending: SURGERY
Payer: COMMERCIAL

## 2023-08-28 VITALS
RESPIRATION RATE: 18 BRPM | TEMPERATURE: 97.8 F | WEIGHT: 175.6 LBS | OXYGEN SATURATION: 93 % | BODY MASS INDEX: 32.64 KG/M2 | SYSTOLIC BLOOD PRESSURE: 114 MMHG | HEART RATE: 87 BPM | DIASTOLIC BLOOD PRESSURE: 66 MMHG

## 2023-08-28 LAB
ATRIAL RATE - MUSE: 84 BPM
CALCIUM, IONIZED MEASURED: 1.1 MMOL/L (ref 1.11–1.3)
DIASTOLIC BLOOD PRESSURE - MUSE: NORMAL MMHG
INTERPRETATION ECG - MUSE: NORMAL
ION CA PH 7.4: 1.12 MMOL/L (ref 1.11–1.3)
LVEF ECHO: NORMAL
MAGNESIUM SERPL-MCNC: 2.1 MG/DL (ref 1.7–2.3)
P AXIS - MUSE: 39 DEGREES
PH: 7.44 (ref 7.35–7.45)
PHOSPHATE SERPL-MCNC: 2.8 MG/DL (ref 2.5–4.5)
PLATELET # BLD AUTO: 155 10E3/UL (ref 150–450)
POTASSIUM SERPL-SCNC: 4.1 MMOL/L (ref 3.4–5.3)
PR INTERVAL - MUSE: 200 MS
QRS DURATION - MUSE: 98 MS
QT - MUSE: 412 MS
QTC - MUSE: 486 MS
R AXIS - MUSE: 116 DEGREES
SYSTOLIC BLOOD PRESSURE - MUSE: NORMAL MMHG
T AXIS - MUSE: -63 DEGREES
VENTRICULAR RATE- MUSE: 84 BPM

## 2023-08-28 PROCEDURE — 250N000011 HC RX IP 250 OP 636: Mod: JZ | Performed by: SURGERY

## 2023-08-28 PROCEDURE — 93306 TTE W/DOPPLER COMPLETE: CPT | Mod: 26 | Performed by: INTERNAL MEDICINE

## 2023-08-28 PROCEDURE — 250N000013 HC RX MED GY IP 250 OP 250 PS 637: Performed by: SURGERY

## 2023-08-28 PROCEDURE — 255N000002 HC RX 255 OP 636: Performed by: SURGERY

## 2023-08-28 PROCEDURE — 250N000013 HC RX MED GY IP 250 OP 250 PS 637: Performed by: PHYSICIAN ASSISTANT

## 2023-08-28 PROCEDURE — 97110 THERAPEUTIC EXERCISES: CPT | Mod: GO

## 2023-08-28 PROCEDURE — 97535 SELF CARE MNGMENT TRAINING: CPT | Mod: GO

## 2023-08-28 PROCEDURE — 84132 ASSAY OF SERUM POTASSIUM: CPT | Performed by: SURGERY

## 2023-08-28 PROCEDURE — 83735 ASSAY OF MAGNESIUM: CPT | Performed by: SURGERY

## 2023-08-28 PROCEDURE — 82330 ASSAY OF CALCIUM: CPT | Performed by: SURGERY

## 2023-08-28 PROCEDURE — 85049 AUTOMATED PLATELET COUNT: CPT | Performed by: SURGERY

## 2023-08-28 PROCEDURE — 36415 COLL VENOUS BLD VENIPUNCTURE: CPT | Performed by: SURGERY

## 2023-08-28 PROCEDURE — 84100 ASSAY OF PHOSPHORUS: CPT | Performed by: SURGERY

## 2023-08-28 RX ORDER — ASPIRIN 81 MG/1
81 TABLET, CHEWABLE ORAL DAILY
Qty: 90 TABLET | Refills: 3 | Status: SHIPPED | OUTPATIENT
Start: 2023-08-29

## 2023-08-28 RX ORDER — METOPROLOL TARTRATE 37.5 MG/1
37.5 TABLET, FILM COATED ORAL 2 TIMES DAILY
Qty: 90 TABLET | Refills: 0 | Status: SHIPPED | OUTPATIENT
Start: 2023-08-28 | End: 2023-09-21

## 2023-08-28 RX ORDER — ACETAMINOPHEN 325 MG/1
975 TABLET ORAL 4 TIMES DAILY
Qty: 200 TABLET | Refills: 0 | Status: SHIPPED | OUTPATIENT
Start: 2023-08-28 | End: 2023-10-09

## 2023-08-28 RX ORDER — METHOCARBAMOL 1000 MG/1
1000 TABLET, FILM COATED ORAL 4 TIMES DAILY PRN
Qty: 20 TABLET | Refills: 0 | Status: SHIPPED | OUTPATIENT
Start: 2023-08-28 | End: 2023-09-21

## 2023-08-28 RX ORDER — AMOXICILLIN 250 MG
1 CAPSULE ORAL 2 TIMES DAILY
Qty: 30 TABLET | Refills: 0 | Status: SHIPPED | OUTPATIENT
Start: 2023-08-28 | End: 2023-09-21

## 2023-08-28 RX ORDER — METHOCARBAMOL 500 MG/1
1000 TABLET, FILM COATED ORAL 4 TIMES DAILY PRN
Status: DISCONTINUED | OUTPATIENT
Start: 2023-08-28 | End: 2023-08-28 | Stop reason: HOSPADM

## 2023-08-28 RX ORDER — OXYCODONE HYDROCHLORIDE 5 MG/1
TABLET ORAL
Qty: 32 TABLET | Refills: 0 | Status: SHIPPED | OUTPATIENT
Start: 2023-08-28 | End: 2023-09-11

## 2023-08-28 RX ORDER — HYDROXYZINE HYDROCHLORIDE 10 MG/1
10-20 TABLET, FILM COATED ORAL 3 TIMES DAILY PRN
Qty: 30 TABLET | Refills: 0 | Status: SHIPPED | OUTPATIENT
Start: 2023-08-28 | End: 2023-09-21

## 2023-08-28 RX ADMIN — OXYCODONE HYDROCHLORIDE 5 MG: 5 TABLET ORAL at 12:17

## 2023-08-28 RX ADMIN — METOPROLOL TARTRATE 12.5 MG: 25 TABLET, FILM COATED ORAL at 10:14

## 2023-08-28 RX ADMIN — HEPARIN SODIUM 5000 UNITS: 10000 INJECTION, SOLUTION INTRAVENOUS; SUBCUTANEOUS at 06:50

## 2023-08-28 RX ADMIN — PERFLUTREN 2 ML: 6.52 INJECTION, SUSPENSION INTRAVENOUS at 13:02

## 2023-08-28 RX ADMIN — OXYCODONE HYDROCHLORIDE 5 MG: 5 TABLET ORAL at 08:16

## 2023-08-28 RX ADMIN — FUROSEMIDE 20 MG: 20 TABLET ORAL at 08:16

## 2023-08-28 RX ADMIN — OXYCODONE HYDROCHLORIDE 5 MG: 5 TABLET ORAL at 00:00

## 2023-08-28 RX ADMIN — PANTOPRAZOLE SODIUM 40 MG: 40 TABLET, DELAYED RELEASE ORAL at 08:17

## 2023-08-28 RX ADMIN — METOPROLOL TARTRATE 25 MG: 25 TABLET, FILM COATED ORAL at 08:17

## 2023-08-28 RX ADMIN — POTASSIUM & SODIUM PHOSPHATES POWDER PACK 280-160-250 MG 2 PACKET: 280-160-250 PACK at 00:00

## 2023-08-28 RX ADMIN — ACETAMINOPHEN 975 MG: 325 TABLET ORAL at 08:16

## 2023-08-28 RX ADMIN — ACETAMINOPHEN 975 MG: 325 TABLET ORAL at 12:17

## 2023-08-28 RX ADMIN — ASPIRIN 81 MG CHEWABLE TABLET 81 MG: 81 TABLET CHEWABLE at 08:16

## 2023-08-28 RX ADMIN — MAGNESIUM OXIDE TAB 400 MG (241.3 MG ELEMENTAL MG) 400 MG: 400 (241.3 MG) TAB at 08:17

## 2023-08-28 RX ADMIN — OXYCODONE HYDROCHLORIDE 5 MG: 5 TABLET ORAL at 04:03

## 2023-08-28 RX ADMIN — MAGNESIUM OXIDE TAB 400 MG (241.3 MG ELEMENTAL MG) 400 MG: 400 (241.3 MG) TAB at 12:17

## 2023-08-28 RX ADMIN — HYDROXYZINE HYDROCHLORIDE 20 MG: 10 TABLET ORAL at 06:56

## 2023-08-28 RX ADMIN — POTASSIUM & SODIUM PHOSPHATES POWDER PACK 280-160-250 MG 2 PACKET: 280-160-250 PACK at 04:03

## 2023-08-28 RX ADMIN — LIDOCAINE 2 PATCH: 4 PATCH TOPICAL at 06:57

## 2023-08-28 ASSESSMENT — ACTIVITIES OF DAILY LIVING (ADL)
ADLS_ACUITY_SCORE: 22

## 2023-08-28 NOTE — PROGRESS NOTES
Care Management Discharge Note    Discharge Date: 08/28/2023       Discharge Disposition:  home    Discharge Services:  Outpatient Cardiac rehab    Discharge DME:  per therapy    Discharge Transportation:  family or friend to transport    Private pay costs discussed: Not applicable    Does the patient's insurance plan have a 3 day qualifying hospital stay waiver?  No    PAS Confirmation Code:    Patient/family educated on Medicare website which has current facility and service quality ratings:      Education Provided on the Discharge Plan:    Persons Notified of Discharge Plans: yes  Patient/Family in Agreement with the Plan:      Handoff Referral Completed: Yes    Additional Information:  Pt medically ready to discharge. No CM needs identified. Cm will sign off.    Zena Ayala RN

## 2023-08-28 NOTE — PLAN OF CARE
Goal Outcome Evaluation:                    sc  Patient Name: Charlotte Blair   MRN: [id number]   Date of Admission: 8/24/2023    Procedure: Procedure(s):  Mitral valve repair, left atrial appendage ligation with ANESTHESIA TRANSESOPHAGEAL ECHOCARDIOGRAM    Post Op day #:4    Subjective (Patient focus/Primary Problem for shift): sleep          Pain Goal 0 Pain Rating 5-7           Pain Medication/ Regime effective to reduce patient pain prn oxycodone 5 mg Q 4 hours    Objective (Physical assessment):           Rhythm: normal sinus rhythm            Bowel Activity: yes if Yes indicate when: 8/27          Bowel Medications: yes            Incision: healing well          Incentive Spirometry Q 1-2 hour when awake:  yes Volume: 700          Epicardial Pacing Wires:  no            Patient Activity:           Up to chair for meals: yes          Ambulation with RN x2 (Not including CR): not applicable            Is patient in home clothes:no             Chest Tubes   Pleural: no Draining: not applicable               Suction: not applicable              Mediastinal: no Draining: not applicable               Suction: not applicable   Dressing Change Daily:yes If No, why?n/a                     Urinary Catheter: no           Preventative WOC consult (need MD order): no       Assessment (Nursing primary shift focus): Patient alert and oriented.  She is able to ambulate independently in room while following sternal precautions.  Received prn oxycodone 5 mg Q 4 hours for incisional pain.  This has helped keep pain under control.  Patient reports pain more manageable since chest tubes and pacer wire has been removed previous day.  Dressing covering Chest tube site intact.  PRN hydroxyzine given at midnight for anxiety.  Patient has been able to sleep for a few hours  Shoulder massage given at midnight for comfort.  Incentive spirometer done for 700 ml.  Patient reports using incentive spirometer consistently during daytime.   Flutter valve also used.  Staff grouping cares to promote sleep.  Will continue to monitor.    Plan (Patient Care Plan/focus): Allow patient to sleep rest of shift.  At 0700, suggest patient sit in recliner for breakfast.  Watch for lab results for electrolytes and replace as needed.  Patient looking forward to having a shower 24 hours after chest tubes have been removed.      Desire Conley RN   8/28/2023   4:59 AM

## 2023-08-28 NOTE — DISCHARGE SUMMARY
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]  Mitral regurgitation [I34.0]  Discharge Date: 8/28/2023  Disposition: Home  Condition at Discharge: Good  Code Status: Full Code     Principal Diagnosis:   Mitral regurgitation s/p mitral valve repair    Discharge Diagnoses:    Active Problems:      Patient Active Problem List   Diagnosis    Horn Syndrome    Anxiety    Acquired hypothyroidism    Class 1 obesity due to excess calories with serious comorbidity and body mass index (BMI) of 31.0 to 31.9 in adult    Counseling for birth control, oral contraceptives    Severe mitral insufficiency    Major depression in complete remission (H)    Mitral valve prolapse    Mitral regurgitation         Consult/s: Dietary, critical care medicine    Surgery:   08/24/2023 with Dr. Magdaleno  Mitral valve repair (Posterior leaflet quadrangular resection, posterior annular reduction annuloplasty, closure of P1/P2 cleft, placement of A2 scallop neochordae x 3, and placement of 38 mm Medtronic CG Future annuloplasty ring).   Left atrial appendage excision.      Discharge Medications:      Review of your medicines        START taking        Dose / Directions   acetaminophen 325 MG tablet  Commonly known as: TYLENOL  Used for: S/P mitral valve repair      Dose: 975 mg  Take 3 tablets (975 mg) by mouth 4 times daily Can wean down over time  Quantity: 200 tablet  Refills: 0     aspirin 81 MG chewable tablet  Commonly known as: ASA  Used for: S/P mitral valve repair      Dose: 81 mg  Start taking on: August 29, 2023  Take 1 tablet (81 mg) by mouth daily  Quantity: 90 tablet  Refills: 3     hydrOXYzine 10 MG tablet  Commonly known as: ATARAX  Used for: S/P mitral valve repair      Dose: 10-20 mg  Take 1-2 tablets (10-20 mg) by mouth 3 times daily  as needed for anxiety or other (adjuvant to pain medication)  Quantity: 30 tablet  Refills: 0     Methocarbamol 1000 MG Tabs  Used for: S/P mitral valve repair      Dose: 1,000 mg  Take 1,000 mg by mouth 4 times daily as needed for muscle spasms  Quantity: 20 tablet  Refills: 0     Metoprolol Tartrate 37.5 MG Tabs  Used for: S/P mitral valve repair      Dose: 37.5 mg  Take 37.5 mg by mouth 2 times daily  Quantity: 90 tablet  Refills: 0     oxyCODONE 5 MG tablet  Commonly known as: ROXICODONE  Used for: S/P mitral valve repair      Start taking on: August 28, 2023  Take 1 tablet (5 mg) by mouth every 6 hours as needed for moderate pain for 3 days, THEN 1 tablet (5 mg) every 8 hours as needed for moderate pain for 3 days, THEN 1 tablet (5 mg) every 12 hours as needed for moderate pain for 3 days, THEN 1 tablet (5 mg) daily as needed for moderate pain.  Quantity: 32 tablet  Refills: 0     senna-docusate 8.6-50 MG tablet  Commonly known as: SENOKOT-S/PERICOLACE  Used for: S/P mitral valve repair      Dose: 1 tablet  Take 1 tablet by mouth 2 times daily  Quantity: 30 tablet  Refills: 0            CONTINUE these medicines which have NOT CHANGED        Dose / Directions   ibuprofen 200 MG tablet  Commonly known as: ADVIL/MOTRIN      Dose: 400 mg  Take 400 mg by mouth every 12 hours as needed for pain  Refills: 0     norgestim-eth estrad triphasic 0.18/0.215/0.25 MG-35 MCG tablet  Commonly known as: ORTHO TRI-CYCLEN  Used for: Counseling for birth control, oral contraceptives      Dose: 1 tablet  Take 1 tablet by mouth daily  Quantity: 84 tablet  Refills: 2               Where to get your medicines        These medications were sent to [facility] [address] [address]      Phone: [phone]   acetaminophen 325 MG tablet  aspirin 81 MG chewable tablet  hydrOXYzine 10 MG tablet  Methocarbamol 1000 MG Tabs  Metoprolol Tartrate 37.5  "MG Tabs  oxyCODONE 5 MG tablet  senna-docusate 8.6-50 MG tablet         Discharge Instructions:    Follow up appointment with Primary Care Physician: Chito Coppola within 7 days of discharge.  Follow up appointment with Specialist:    Follow with CV Surgery as scheduled.   Follow-up with cardiology as scheduled    Diet: Cardiac    Activity/Restrictions: As tolerated with sternal precautions in mind (see below). No driving for 4 weeks or while on pain medication.     - Shower and wash your incisions daily with soap and water. No tub baths/hot tubs for 4 weeks. An antibacterial soap such as Dial or Safeguard is recommended.    - Check your incisions every day. If you notice any redness, drainage, or anything unusual, please call the surgeons office.    - No driving for 4 weeks after surgery or while on pain medication     - If you have had a valve repair or replacement, check with your cardiologist regarding the need for antibiotics before dental visits or other medical procedures.    - Do not lift anything more than 10 pounds for 6 weeks after surgery. After 6 weeks, advance lifting is tolerated.    - You may have watery drainage from your chest tube site for 2-3 weeks after surgery. Your may cover with a Band-Aid to protect your clothing. Remove the Band-Aid every day and wash the site.    - If you have a leg lesion, you may have swelling for 2-3 months. Elevate your leg any time you are not walking.    - If you feel any \"popping\" or \"clicking\" sensations in your chest, your arms are out too far or you are putting too much weight into arm movements. Do not reach over your head or out to the side to pull something. Do not do any arm exercises or use any exercise equipment that involves arm movement. If you feel your sternum moving, call the surgeon's office.    - Increase your daily activity as explained by Cardiac Rehab. You are encouraged to enroll in an Outpatient Cardiac Rehab Program.    - No active sports " "using your upper arms for 3 months. This includes fishing, hunting, bowling, swimming, tennis or golf.    - No physical activity such as cutting the grass, raking, vacuuming, changing sheets on your bed, snow shoveling, or using a  for 3 months.    - Use incentive spirometer 6-8 times per day for 2 weeks.       Hospital Summary:   Charlotte Blair is a 23 year old female who was admitted to [facility] on 8/24/2023 for elective mitral valve repair due to known mitral regurgitation.    Patient was deemed a candidate for surgery, and was taken to the operating room on 08/24/2023 where patient underwent the above procedures. Surgery was uneventful and patient was brought to the ICU post-operatively. She was extubated on POD#0 and weaned from pressors. Patient was awake and alert, afebrile, and with stable vitals. Insulin drip was discontinued and she was transitioned to a sliding scale. She was transferred to general telemetry status on POD#1 where patient has had return of bowel function, is maintaining oxygen saturations on room air, had her chest tubes removed, and has no complaints of chest pain or shortness of breath. On 08/28/23, patient was stable enough to be discharged to home.    Of note, patient has had difficult-to-control pain post-op. Planning for scheduled Tylenol, PRN Robaxin and Atarax, and a oxycodone taper. Should patient need more pain meds after this course, she will likely need referral to a pain clinic.    New baseline echo was completed prior to discharge.    Vital signs:  Temp: 97.8  F (36.6  C) Temp src: Oral BP: 123/76 Pulse: 98   Resp: 18 SpO2: 96 % O2 Device: None (Room air) Oxygen Delivery:  (room air)   Weight: 79.7 kg (175 lb 9.6 oz)  Estimated body mass index is 32.64 kg/m  as calculated from the following:    Height as of 8/8/23: 1.562 m (5' 1.5\").    Weight as of this encounter: 79.7 kg (175 lb 9.6 oz).          Physical Exam:    Pertinent exam findings on day of " discharge include:  Gen: Seen up in chair. NAD. Pleasant and conversant.   CV: RRR on monitor. No edema.  Pulm: Non-labored breathing on room air.  Abd: Soft, non-tender, non-distended  Neuro: CNs grossly intact  Inc: C/D/I    _______  Dasha Nolen PA-C  Cardiothoracic Surgery  [phone]

## 2023-08-28 NOTE — PROGRESS NOTES
NUTRITION EDUCATION      REASON FOR ASSESSMENT:  Provider order:  Education status post cardiac surgery    NUTRITION HISTORY:  Patient has been on a regular diet since surgery.  We discussed the low sodium diet as recommended after valve surgery.    NUTRITION DIAGNOSIS:  Food- and nutrition-related knowledge deficit R/t heart disease    INTERVENTIONS:    Nutrition Prescription:  Low sodium diet    Implementation:      *  Nutrition Education (Content):   A)  Provided handout Low Sodium Nutrition Therapy, Label reading tips, Meal plate for heart Health   B)  Discussed low sodium, limiting salt to 1 teaspoon for the day, limiting processed foods that include canned food, frozen meals and eating out.  Avoiding salty foods such as chips and dip, soy sauce, fish sauce.        *  Nutrition Education (Application):   A)  Discussed current eating habits and recommended alternative food choices      *  Anticipate good compliance      *  Diet Education - refer to Education Flowsheet    Goals:      *  Patient participated in diet education.  Patient able to summarize points discussed.       *  All of the above goals met during the education session    Follow Up/Monitoring:      *  Provided RD contact information for future questions      *  Recommended Out-Patient Nutrition Referral, if further diet instructions are needed

## 2023-08-28 NOTE — PLAN OF CARE
Occupational Therapy Discharge Summary    Reason for therapy discharge:    All goals and outcomes met, no further needs identified.    Progress towards therapy goal(s). See goals on Care Plan in [facility] electronic health record for goal details.  Goals met    Therapy recommendation(s):    No further therapy is recommended.    Goal Outcome Evaluation:

## 2023-08-28 NOTE — PLAN OF CARE
Goal Outcome Evaluation:    Patient taking 5 mg PO Q 4 hours for pain.   Patient received discharge orders.  AVS reviewed with patient.  OPP sent medications over and they were reviewed with patient, verbalizes understanding of use.  Patient was given a shower prior to discharge.

## 2023-08-30 ENCOUNTER — TELEPHONE (OUTPATIENT)
Dept: CARDIOLOGY | Facility: CLINIC | Age: 23
End: 2023-08-30
Payer: COMMERCIAL

## 2023-08-30 NOTE — TELEPHONE ENCOUNTER
ACTIVITY  How is your activity tolerance? Patient up and walking several times per day; tolerating activity well.     POST OP MONITORING  How is your pain on a 0-10 scale, how are you managing your pain? Pain reported 3/10 in the shoulder and back; patient taking acetaminophen 3 times per day; taking muscle relaxer as needed.     Are following your sternal precautions? Yes    Do you hear any clicking when you are moving or taking a deep breath?  No    Are you weighing yourself daily?  Yes weight decreasing    Are you using the inspirometer? Yes 3-4      SIGNS AND SYMPTOMS OF INFECTION  1. INCREASE IN PAIN No  2. FEVER No  3. DRAINAGE No    If Yes, color:                 5. REDNESS No    6. SWELLING No  Sternal incision is healing well, wound edges approximated well. Call your surgeon or primary care provider's office immediately if experiencing any of the symptoms mentioned above. Chest tube incision sites are healing well; no s/s of infection present. Drainage No     ASSISTANCE  Do you have someone at home to assist you with your daily activities?  Yes      MEDICATIONS  Is someone helping you to set up your medications?  Yes  Do you have any questions about your medications?  No        FOLLOW UP  Are you scheduled for cardiac rehab? 9/8/23      You are scheduled to see our surgery advanced practice provider for post operative follow up on 9/21/23   You are scheduled to see your cardiologist on 10/9       CONTACT INFORMATION  Please feel free to call us with any other questions or symptoms that are concerning for you at , if it is after 4:30 in the afternoon, or a weekend please call [phone] and ask for the on call specialist.  We want to do everything we can to help prevent you needing to return to the ED, so please do not hesitate to call us.

## 2023-09-01 ENCOUNTER — HOSPITAL ENCOUNTER (OUTPATIENT)
Dept: CARDIAC REHAB | Facility: CLINIC | Age: 23
Discharge: HOME | End: 2023-09-01
Attending: INTERNAL MEDICINE
Payer: COMMERCIAL

## 2023-09-01 DIAGNOSIS — Z98.890 S/P MITRAL VALVE REPAIR: ICD-10-CM

## 2023-09-01 PROCEDURE — 93797 PHYS/QHP OP CAR RHAB WO ECG: CPT

## 2023-09-01 PROCEDURE — 93798 PHYS/QHP OP CAR RHAB W/ECG: CPT

## 2023-09-05 ENCOUNTER — HOSPITAL ENCOUNTER (OUTPATIENT)
Dept: CARDIAC REHAB | Facility: CLINIC | Age: 23
Discharge: HOME | End: 2023-09-05
Attending: PHYSICIAN ASSISTANT
Payer: COMMERCIAL

## 2023-09-05 PROCEDURE — 93798 PHYS/QHP OP CAR RHAB W/ECG: CPT

## 2023-09-08 ENCOUNTER — HOSPITAL ENCOUNTER (OUTPATIENT)
Dept: CARDIAC REHAB | Facility: CLINIC | Age: 23
Discharge: HOME | End: 2023-09-08
Attending: PHYSICIAN ASSISTANT
Payer: COMMERCIAL

## 2023-09-08 PROCEDURE — 93798 PHYS/QHP OP CAR RHAB W/ECG: CPT

## 2023-09-12 ENCOUNTER — HOSPITAL ENCOUNTER (OUTPATIENT)
Dept: CARDIAC REHAB | Facility: CLINIC | Age: 23
Discharge: HOME | End: 2023-09-12
Attending: PHYSICIAN ASSISTANT
Payer: COMMERCIAL

## 2023-09-12 PROCEDURE — 93798 PHYS/QHP OP CAR RHAB W/ECG: CPT

## 2023-09-14 ENCOUNTER — TELEPHONE (OUTPATIENT)
Dept: CARDIOLOGY | Facility: CLINIC | Age: 23
End: 2023-09-14
Payer: COMMERCIAL

## 2023-09-14 NOTE — TELEPHONE ENCOUNTER
Patient concerned about a chest tube site draining some purulent fluid. Emailed picture to CV RN. She does not have follow up set up with her primary, so patient will do so. Picture reviewed and chest tube site looks to be healing well.    She sees CV surgery next week in clinic.

## 2023-09-15 ENCOUNTER — HOSPITAL ENCOUNTER (OUTPATIENT)
Dept: CARDIAC REHAB | Facility: CLINIC | Age: 23
Discharge: HOME | End: 2023-09-15
Attending: PHYSICIAN ASSISTANT
Payer: COMMERCIAL

## 2023-09-15 PROCEDURE — 93798 PHYS/QHP OP CAR RHAB W/ECG: CPT

## 2023-09-19 ENCOUNTER — HOSPITAL ENCOUNTER (OUTPATIENT)
Dept: CARDIAC REHAB | Facility: CLINIC | Age: 23
Discharge: HOME | End: 2023-09-19
Attending: PHYSICIAN ASSISTANT
Payer: COMMERCIAL

## 2023-09-19 PROCEDURE — 93798 PHYS/QHP OP CAR RHAB W/ECG: CPT

## 2023-09-21 ENCOUNTER — OFFICE VISIT (OUTPATIENT)
Dept: CARDIOLOGY | Facility: CLINIC | Age: 23
End: 2023-09-21
Payer: COMMERCIAL

## 2023-09-21 VITALS
BODY MASS INDEX: 30.49 KG/M2 | RESPIRATION RATE: 14 BRPM | DIASTOLIC BLOOD PRESSURE: 74 MMHG | HEART RATE: 87 BPM | WEIGHT: 164 LBS | SYSTOLIC BLOOD PRESSURE: 107 MMHG

## 2023-09-21 DIAGNOSIS — R21 RASH AND NONSPECIFIC SKIN ERUPTION: ICD-10-CM

## 2023-09-21 DIAGNOSIS — Z72.0 TOBACCO ABUSE DISORDER: Primary | ICD-10-CM

## 2023-09-21 PROCEDURE — 99024 POSTOP FOLLOW-UP VISIT: CPT | Performed by: PHYSICIAN ASSISTANT

## 2023-09-21 RX ORDER — BENZOCAINE/MENTHOL 6 MG-10 MG
LOZENGE MUCOUS MEMBRANE 2 TIMES DAILY PRN
Qty: 20 G | Refills: 0 | Status: SHIPPED | OUTPATIENT
Start: 2023-09-21 | End: 2023-10-09

## 2023-09-21 RX ORDER — METOPROLOL TARTRATE 25 MG/1
37.5 TABLET, FILM COATED ORAL 2 TIMES DAILY
COMMUNITY
Start: 2023-08-28 | End: 2023-10-09

## 2023-09-21 RX ORDER — LIDOCAINE 4 G/G
1 PATCH TOPICAL EVERY 24 HOURS
Qty: 20 PATCH | Refills: 0 | Status: SHIPPED | OUTPATIENT
Start: 2023-09-21 | End: 2023-10-09

## 2023-09-21 NOTE — PROGRESS NOTES
CARDIOTHORACIC SURGERY FOLLOW-UP VISIT     Charlotte Blair   2000   [id number]      Reason for visit: Post-operative clinic visit. Patient underwent mitral valve repair and left atrial appendage excision with Dr. Magdaleno on 08/24/2023.    HPI: Charlotte Blair is a 23 year old year old female seen in clinic for a routine follow-up appointment after surgery. Patient has past medical history as below. Hospital course was unremarkable. Patient was discharged to home.    Patient has been doing overall well since discharge. Patient did not yet follow-up with primary care since leaving the hospital although has an appointment at the end of this week. Reports that incision is healing well but does endorse slight rash/itchiness near the sternal incision. Denies fevers, peripheral edema. Appetite is improving and patient is voiding without difficulty. Weight has been stable. Pain management at this point with occasional Tylenol. Patient has been attending cardiac rehab and this is going well. Patient is not on anti-coagulation.     PAST MEDICAL HISTORY:  Past Medical History:   Diagnosis Date    Anxiety     previous on zoloft    Disease of thyroid gland     hypothyroxine    Gonadal dysgenesis     Created by Conversion     Heart disease     Urinary tract infection     non recurrent       PAST SURGICAL HISTORY:  Past Surgical History:   Procedure Laterality Date    MITRAL VALVE REPAIR N/A 8/24/2023    Procedure: Mitral valve repair, left atrial appendage ligation with ANESTHESIA TRANSESOPHAGEAL ECHOCARDIOGRAM;  Surgeon: Jeff Magdaleno MD;  Location: [facility] OR    [place] TOOTH EXTRACTION         CURRENT MEDICATIONS:     Current Outpatient Medications:     acetaminophen (TYLENOL) 325 MG tablet, Take 3 tablets (975 mg) by mouth 4 times daily Can wean down over time (Patient taking differently: Take 975 mg by mouth every 8 hours as needed Can wean down over time), Disp: 200 tablet, Rfl: 0    aspirin (ASA)  81 MG chewable tablet, Take 1 tablet (81 mg) by mouth daily, Disp: 90 tablet, Rfl: 3    hydrocortisone (CORTAID) 1 % external cream, Apply topically 2 times daily as needed for rash, Disp: 20 g, Rfl: 0    Lidocaine (LIDOCARE) 4 % Patch, Place 1 patch onto the skin every 24 hours To prevent lidocaine toxicity, patient should be patch free for 12 hrs daily., Disp: 20 patch, Rfl: 0    metoprolol tartrate (LOPRESSOR) 25 MG tablet, Take 37.5 mg by mouth 2 times daily, Disp: , Rfl:     norgestim-eth estrad triphasic (ORTHO TRI-CYCLEN) 0.18/0.215/0.25 MG-35 MCG tablet, Take 1 tablet by mouth daily (Patient not taking: Reported on 9/21/2023), Disp: 84 tablet, Rfl: 2    ALLERGIES:      Allergies   Allergen Reactions    Amoxicillin Hives    Penicillins Hives       ROS:  Gen: No fevers, weight loss/gain  CV: Denies chest pain, peripheral edema  Pulm: Denies SOB  GI/: Voiding without problems, appetite improving.     LABS:  None    IMAGING:  None    PHYSICAL EXAM:   /74 (BP Location: Right arm, Patient Position: Sitting, Cuff Size: Adult Regular)   Pulse 87   Resp 14   Wt 74.4 kg (164 lb)   LMP 06/19/2023   BMI 30.49 kg/m    General: Alert and oriented, pleasant, no acute distress.  CV:  No peripheral edema.  Pulm: Easy work of breathing on room air.   GI: Soft, non-tender, and non-distended  Incision: Chest incisions clean dry and intact without erythema, swelling or drainage  Neuro: CNs grossly intact.      ASSESSMENT/PLAN:  Charlotte Blair is a 23 year old year old female status post mitral valve repair who returns to clinic for postop visit.     - Surgically doing well. Incisions are healing well with no signs of infection.   - Hemodynamics are stable. No medication changes were needed today.  - Congrats on keeping from nicotine--referral for QUITPARTNER input if needed  - Try hydrocortisone cream and/or lidocaine patches for chest rash (sent to your pharmacy)  - Follow-up with primary care as scheduled.   -  Follow up with your cardiologist as scheduled (Dr. Yan 10/09/2023)  - Continue Cardiac Rehab until completed.   - May start driving (4 weeks post-op) if not using narcotic pain medications.  - Continue strict sternal precautions until 10/05/2023. No lifting >10bs; may gradually increase at this point (6 weeks post-op).   - Call us for work note--think of what you'd like that to say (return date, hours per day/week, days per week, etc)  - No need for further follow-up with CV surgery unless concerns. Feel free to call our office with questions. [phone]         _______  Dasha Nolen PA-C  Cardiothoracic Surgery  [phone]

## 2023-09-21 NOTE — LETTER
9/21/2023    Chito Coppola MD  [address]  [place] [address]    RE: Charlotte Blair       Dear Colleague,     I had the pleasure of seeing Charlotte Blair in the [facility].  CARDIOTHORACIC SURGERY FOLLOW-UP VISIT     Charlotte Blair   2000   [id number]      Reason for visit: Post-operative clinic visit. Patient underwent mitral valve repair and left atrial appendage excision with Dr. Magdaleno on 08/24/2023.    HPI: Charlotte Blair is a 23 year old year old female seen in clinic for a routine follow-up appointment after surgery. Patient has past medical history as below. Hospital course was unremarkable. Patient was discharged to home.    Patient has been doing overall well since discharge. Patient did not yet follow-up with primary care since leaving the hospital although has an appointment at the end of this week. Reports that incision is healing well but does endorse slight rash/itchiness near the sternal incision. Denies fevers, peripheral edema. Appetite is improving and patient is voiding without difficulty. Weight has been stable. Pain management at this point with occasional Tylenol. Patient has been attending cardiac rehab and this is going well. Patient is not on anti-coagulation.     PAST MEDICAL HISTORY:  Past Medical History:   Diagnosis Date    Anxiety     previous on zoloft    Disease of thyroid gland     hypothyroxine    Gonadal dysgenesis     Created by Conversion     Heart disease     Urinary tract infection     non recurrent       PAST SURGICAL HISTORY:  Past Surgical History:   Procedure Laterality Date    MITRAL VALVE REPAIR N/A 8/24/2023    Procedure: Mitral valve repair, left atrial appendage ligation with ANESTHESIA TRANSESOPHAGEAL ECHOCARDIOGRAM;  Surgeon: Jeff Magdaleno MD;  Location: [facility] OR    [place] TOOTH EXTRACTION         CURRENT MEDICATIONS:     Current Outpatient Medications:     acetaminophen (TYLENOL) 325 MG tablet,  Take 3 tablets (975 mg) by mouth 4 times daily Can wean down over time (Patient taking differently: Take 975 mg by mouth every 8 hours as needed Can wean down over time), Disp: 200 tablet, Rfl: 0    aspirin (ASA) 81 MG chewable tablet, Take 1 tablet (81 mg) by mouth daily, Disp: 90 tablet, Rfl: 3    hydrocortisone (CORTAID) 1 % external cream, Apply topically 2 times daily as needed for rash, Disp: 20 g, Rfl: 0    Lidocaine (LIDOCARE) 4 % Patch, Place 1 patch onto the skin every 24 hours To prevent lidocaine toxicity, patient should be patch free for 12 hrs daily., Disp: 20 patch, Rfl: 0    metoprolol tartrate (LOPRESSOR) 25 MG tablet, Take 37.5 mg by mouth 2 times daily, Disp: , Rfl:     norgestim-eth estrad triphasic (ORTHO TRI-CYCLEN) 0.18/0.215/0.25 MG-35 MCG tablet, Take 1 tablet by mouth daily (Patient not taking: Reported on 9/21/2023), Disp: 84 tablet, Rfl: 2    ALLERGIES:      Allergies   Allergen Reactions    Amoxicillin Hives    Penicillins Hives       ROS:  Gen: No fevers, weight loss/gain  CV: Denies chest pain, peripheral edema  Pulm: Denies SOB  GI/: Voiding without problems, appetite improving.     LABS:  None    IMAGING:  None    PHYSICAL EXAM:   /74 (BP Location: Right arm, Patient Position: Sitting, Cuff Size: Adult Regular)   Pulse 87   Resp 14   Wt 74.4 kg (164 lb)   LMP 06/19/2023   BMI 30.49 kg/m    General: Alert and oriented, pleasant, no acute distress.  CV:  No peripheral edema.  Pulm: Easy work of breathing on room air.   GI: Soft, non-tender, and non-distended  Incision: Chest incisions clean dry and intact without erythema, swelling or drainage  Neuro: CNs grossly intact.      ASSESSMENT/PLAN:  Charlotte Blair is a 23 year old year old female status post mitral valve repair who returns to clinic for postop visit.     - Surgically doing well. Incisions are healing well with no signs of infection.   - Hemodynamics are stable. No medication changes were needed today.  -  Congrats on keeping from nicotine--referral for QUITPARTNER input if needed  - Try hydrocortisone cream and/or lidocaine patches for chest rash (sent to your pharmacy)  - Follow-up with primary care as scheduled.   - Follow up with your cardiologist as scheduled (Dr. Yan 10/09/2023)  - Continue Cardiac Rehab until completed.   - May start driving (4 weeks post-op) if not using narcotic pain medications.  - Continue strict sternal precautions until 10/05/2023. No lifting >10bs; may gradually increase at this point (6 weeks post-op).   - Call us for work note--think of what you'd like that to say (return date, hours per day/week, days per week, etc)  - No need for further follow-up with CV surgery unless concerns. Feel free to call our office with questions. [phone]         _______  Dasha Nolen PA-C  Cardiothoracic Surgery  [phone]        Thank you for allowing me to participate in the care of your patient.      Sincerely,     Dasha Nolen PA-C     [facility]  cc:   No referring provider defined for this encounter.

## 2023-09-21 NOTE — PATIENT INSTRUCTIONS
- Surgically doing well. Incisions are healing well with no signs of infection.   - Hemodynamics are stable. No medication changes were needed today.  - Congrats on keeping from nicotine--referral for QUITPARTNER input if needed  - Try hydrocortisone cream and/or lidocaine patches for chest rash (sent to your pharmacy)  - Follow-up with primary care as scheduled.   - Follow up with your cardiologist as scheduled (Dr. Yan 10/09/2023)  - Continue Cardiac Rehab until completed.   - May start driving (4 weeks post-op) if not using narcotic pain medications.  - Continue strict sternal precautions until 10/05/2023. No lifting >10bs; may gradually increase at this point (6 weeks post-op).   - Call us for work note--think of what you'd like that to say (return date, hours per day/week, days per week, etc)  - No need for further follow-up with CV surgery unless concerns. Feel free to call our office with questions. [phone]

## 2023-09-25 ENCOUNTER — OFFICE VISIT (OUTPATIENT)
Dept: FAMILY MEDICINE | Facility: CLINIC | Age: 23
End: 2023-09-25
Payer: COMMERCIAL

## 2023-09-25 VITALS
BODY MASS INDEX: 31.04 KG/M2 | HEIGHT: 61 IN | HEART RATE: 94 BPM | SYSTOLIC BLOOD PRESSURE: 108 MMHG | WEIGHT: 164.4 LBS | TEMPERATURE: 98 F | OXYGEN SATURATION: 98 % | DIASTOLIC BLOOD PRESSURE: 72 MMHG

## 2023-09-25 DIAGNOSIS — Q96.9 GONADAL DYSGENESIS: ICD-10-CM

## 2023-09-25 DIAGNOSIS — Z09 FOLLOW-UP EXAMINATION FOLLOWING SURGERY: ICD-10-CM

## 2023-09-25 DIAGNOSIS — Z98.890 S/P MITRAL VALVE REPAIR: ICD-10-CM

## 2023-09-25 DIAGNOSIS — G47.9 SLEEP DIFFICULTIES: ICD-10-CM

## 2023-09-25 DIAGNOSIS — Z30.09 COUNSELING FOR BIRTH CONTROL, ORAL CONTRACEPTIVES: ICD-10-CM

## 2023-09-25 DIAGNOSIS — F32.0 CURRENT MILD EPISODE OF MAJOR DEPRESSIVE DISORDER WITHOUT PRIOR EPISODE (H): ICD-10-CM

## 2023-09-25 DIAGNOSIS — F41.9 ANXIETY: Primary | ICD-10-CM

## 2023-09-25 PROCEDURE — 90686 IIV4 VACC NO PRSV 0.5 ML IM: CPT | Performed by: NURSE PRACTITIONER

## 2023-09-25 PROCEDURE — 90677 PCV20 VACCINE IM: CPT | Performed by: NURSE PRACTITIONER

## 2023-09-25 PROCEDURE — 90472 IMMUNIZATION ADMIN EACH ADD: CPT | Performed by: NURSE PRACTITIONER

## 2023-09-25 PROCEDURE — 99214 OFFICE O/P EST MOD 30 MIN: CPT | Mod: 24 | Performed by: NURSE PRACTITIONER

## 2023-09-25 PROCEDURE — 96127 BRIEF EMOTIONAL/BEHAV ASSMT: CPT | Performed by: NURSE PRACTITIONER

## 2023-09-25 PROCEDURE — 90471 IMMUNIZATION ADMIN: CPT | Performed by: NURSE PRACTITIONER

## 2023-09-25 RX ORDER — NORGESTIMATE AND ETHINYL ESTRADIOL 7DAYSX3 28
1 KIT ORAL DAILY
Qty: 84 TABLET | Refills: 3 | Status: SHIPPED | OUTPATIENT
Start: 2023-09-25 | End: 2023-10-09

## 2023-09-25 ASSESSMENT — PATIENT HEALTH QUESTIONNAIRE - PHQ9
10. IF YOU CHECKED OFF ANY PROBLEMS, HOW DIFFICULT HAVE THESE PROBLEMS MADE IT FOR YOU TO DO YOUR WORK, TAKE CARE OF THINGS AT HOME, OR GET ALONG WITH OTHER PEOPLE: SOMEWHAT DIFFICULT
SUM OF ALL RESPONSES TO PHQ QUESTIONS 1-9: 7
SUM OF ALL RESPONSES TO PHQ QUESTIONS 1-9: 7

## 2023-09-25 ASSESSMENT — PAIN SCALES - GENERAL: PAINLEVEL: NO PAIN (0)

## 2023-09-25 NOTE — PROGRESS NOTES
Assessment & Plan   (F41.9) Anxiety  (primary encounter diagnosis)  Comment: increased since her procedure, would like to start on a medication today, has been on zoloft in the past would like to look at a different medication  Plan: sertraline (ZOLOFT) 50 MG tablet        Follow up with a video visit in a couple months to discuss effectiveness     (Z30.09) Counseling for birth control, oral contraceptives  Comment: has been off since her procedure. Would like to restart  Plan: norgestim-eth estrad triphasic (ORTHO         TRI-CYCLEN) 0.18/0.215/0.25 MG-35 MCG tablet        Refill sent to pharmacy    Comment: ongoing. Anxiety possibly contributing to trouble falling asleep  Plan: recommended Melatonin 0.5 mg to 1 mg a couple hours before falling asleep to help support her inherent circadian rhythm.   - follow up on sleep at video visit in a couple months to discuss effectiveness      Comment: PHQ9 score 7  Plan: Sertraline as indicated above. Encouraged patient to continue working with cardiac rehab to continue healing from her surgery. Encouraged her to use the free therapy sessions she has available to her through her work.     (Z09) Follow-up examination following surgery  Comment: surgical incisions healing well. Rash that was noticed by patient near her sternal incision has improved using hydrocortisone. 2 sites at her upper abdomen are scabbed and healing well. No signs of infection noted. Patient will have another follow up with her cardiologist in October.   Plan: Continue follow up with cardiology, continue cardiac rehab, patient to monitor for signs/symptoms of infection (pain, spreading redness, purulent drainage from sites).     Encounter Diagnoses   Name Primary?    Counseling for birth control, oral contraceptives     Anxiety Yes    Current mild episode of major depressive disorder without prior episode (H)     Follow-up examination following surgery     Horn Syndrome     S/P mitral valve repair   "   Sleep difficulties       MED REC REQUIRED  Post Medication Reconciliation Status: discharge medications reconciled, continue medications without change  BMI:   Estimated body mass index is 30.84 kg/m  as calculated from the following:    Height as of this encounter: 1.555 m (5' 1.22\").    Weight as of this encounter: 74.6 kg (164 lb 6.4 oz).   Weight management plan: Discussed healthy diet and exercise guidelines        CALISTA Spangler CNP  [facility]    Syeda Porter is a 23 year old, presenting for the following health issues:  Hospital F/U (08/24/2023, mitral valve repair, [facility]. Discuss SOB) and Establish Care        9/25/2023    10:09 AM   Additional Questions   Roomed by Shahzad Koehler, Visit Facilitator     Hospital Follow-up Visit:  Hospital/Nursing Home/IP Rehab Facility: [facility]  Date of Admission: 08/24/2024  Date of Discharge: 08/28/2023  Reason(s) for Admission: mitral valve repair    Was your hospitalization related to COVID-19? No   Problems taking medications regularly:    Medication changes since discharge: None  Problems adhering to non-medication therapy:  None    Summary of hospitalization:  [facility] discharge summary reviewed  Diagnostic Tests/Treatments reviewed.  Follow up needed:   Other Healthcare Providers Involved in Patient s Care:  Update since discharge: improved.   Plan of care communicated with patient        HPI   Patient had surgery 8/24/23- mitral valve repair and left atrial appendage repair. Has been work with cardiac rehab. Tolerating well but still notes she has shortness of breath from time to time. Taking medications as prescribed- encouraged her to follow up with cardiology in relation to how long they will keep her on the metoprolol. She has been able to abstain from vaping for over one month now. She indicates that she feels better no longer vaping. Congratulated her " "on abstaining from vaping. She would like to return to work in mid October.     Surgical scars are healing well. Patient has noted some soreness at the scabbing incision sites on her upper abdomen. Patient has also noted some oozing fluid around the healing sites. Notes the fluid is mostly clear but sometimes appears cloudy.     Scored a 7 on PHQ9 and would like to talk about medications to help manage her depression and anxiety symptoms. She has noticed some increase in depression and anxiety symptoms since her surgery. She feels this is related to decreased function after surgery. She does also indicate that she has free therapy sessions available to her through her work.     She would also like to talk about trouble with sleep. She indicates that she has trouble falling asleep at night and because she is more worried. She has tried melatonin in the past but was uncertain if this was working for her. She has also had to take more naps during the day (3+ hours) since her procedure.               Review of Systems   Constitutional, HEENT, cardiovascular, pulmonary, gi and gu systems are negative, except as otherwise noted.      Objective    /72 (BP Location: Left arm, Patient Position: Sitting, Cuff Size: Adult Regular)   Pulse 94   Temp 98  F (36.7  C) (Oral)   Ht 1.555 m (5' 1.22\")   Wt 74.6 kg (164 lb 6.4 oz)   LMP 06/19/2023   SpO2 98%   BMI 30.84 kg/m    Body mass index is 30.84 kg/m .  Physical Exam   GENERAL: healthy, alert and no distress  EYES: Eyes grossly normal to inspection, PERRL and conjunctivae and sclerae normal  HENT: ear canals and TM's normal, nose and mouth without ulcers or lesions  NECK: no adenopathy, no asymmetry, masses, or scars and thyroid normal to palpation  RESP: lungs clear to auscultation - no rales, rhonchi or wheezes  CV: regular rate and rhythm, normal S1 S2, no S3 or S4, no murmur, click or rub, no peripheral edema and peripheral pulses strong  ABDOMEN: soft, " nontender, no hepatosplenomegaly, no masses and bowel sounds normal  MS: no gross musculoskeletal defects noted, no edema  SKIN: no suspicious lesions or rashes, scar -  sternum (surgical) , and two scabbing surgical areas on upper abdomen.   NEURO: Normal strength and tone, mentation intact and speech normal  PSYCH: mentation appears normal, affect normal/bright    No results displayed because visit has over 200 results.      Last Comprehensive Metabolic Panel:  Lab Results   Component Value Date     08/25/2023    POTASSIUM 4.1 08/28/2023    CHLORIDE 105 08/25/2023    CO2 22 08/25/2023    ANIONGAP 12 08/25/2023    GLC 99 08/27/2023    BUN 6.8 08/25/2023    CR 0.51 08/25/2023    GFRESTIMATED >90 08/25/2023    JAMSHID 8.1 (L) 08/25/2023               Answers submitted by the patient for this visit:  Patient Health Questionnaire (Submitted on 9/25/2023)  If you checked off any problems, how difficult have these problems made it for you to do your work, take care of things at home, or get along with other people?: Somewhat difficult  PHQ9 TOTAL SCORE: 7

## 2023-09-26 ENCOUNTER — HOSPITAL ENCOUNTER (OUTPATIENT)
Dept: CARDIAC REHAB | Facility: CLINIC | Age: 23
Discharge: HOME | End: 2023-09-26
Attending: PHYSICIAN ASSISTANT
Payer: COMMERCIAL

## 2023-09-26 PROCEDURE — 93798 PHYS/QHP OP CAR RHAB W/ECG: CPT

## 2023-09-27 ENCOUNTER — HOSPITAL ENCOUNTER (OUTPATIENT)
Dept: CARDIAC REHAB | Facility: CLINIC | Age: 23
Discharge: HOME | End: 2023-09-27
Attending: PHYSICIAN ASSISTANT
Payer: COMMERCIAL

## 2023-09-27 ENCOUNTER — HOSPITAL ENCOUNTER (OUTPATIENT)
Dept: CARDIAC REHAB | Facility: CLINIC | Age: 23
Discharge: HOME | End: 2023-09-27
Attending: INTERNAL MEDICINE
Payer: COMMERCIAL

## 2023-09-27 PROCEDURE — 93797 PHYS/QHP OP CAR RHAB WO ECG: CPT

## 2023-09-27 PROCEDURE — 93798 PHYS/QHP OP CAR RHAB W/ECG: CPT

## 2023-10-09 ENCOUNTER — OFFICE VISIT (OUTPATIENT)
Dept: CARDIOLOGY | Facility: CLINIC | Age: 23
End: 2023-10-09
Payer: COMMERCIAL

## 2023-10-09 VITALS
SYSTOLIC BLOOD PRESSURE: 96 MMHG | RESPIRATION RATE: 6 BRPM | OXYGEN SATURATION: 98 % | WEIGHT: 169 LBS | BODY MASS INDEX: 31.7 KG/M2 | DIASTOLIC BLOOD PRESSURE: 56 MMHG | HEART RATE: 96 BPM

## 2023-10-09 DIAGNOSIS — Z98.890 S/P MITRAL VALVE REPAIR: Primary | ICD-10-CM

## 2023-10-09 PROCEDURE — 99215 OFFICE O/P EST HI 40 MIN: CPT | Performed by: INTERNAL MEDICINE

## 2023-10-09 RX ORDER — METOPROLOL TARTRATE 25 MG/1
25 TABLET, FILM COATED ORAL 2 TIMES DAILY
Qty: 60 TABLET | Refills: 11 | Status: SHIPPED | OUTPATIENT
Start: 2023-10-09 | End: 2024-01-29

## 2023-10-09 NOTE — LETTER
10/9/2023    Taylor Monte, APRN CNP  [address] [facility] [address]    RE: Charlotte Rogersb       Dear Colleague,     I had the pleasure of seeing Charlotte Blair in the [facility].    HEART CARE ENCOUNTER CONSULTATON NOTE      [facility]  [phone]      Assessment/Recommendations   Assessment:  1.  Valvular heart disease: Severe mitral valve prolapse with regurgitation status post mitral valve repair 8/24/2023 with left atrial appendage ligation by Dr. Magdaleno.  Postoperative echocardiogram showed mild regurgitation with mean gradient of 4-5 mmHg at a heart rate of 84 bpm.  2.  Mild nonischemic cardiomyopathy LVEF 40-45% postoperatively  3.  Horn syndrome    Plan:  1.  Continue on aspirin 81 mg daily  2.  May decrease metoprolol dose to 5 mg twice daily  3.  Continue with cardiac rehab  Follow-up in a few months       History of Present Illness/Subjective    HPI: Charlotte Blair is a 23 year old female with history of Horn syndrome, bileaflet mitral valve prolapse with severe mitral insufficiency who recently underwent mitral valve repair and left atrial appendage ligation by Dr. Magdaleno on 8/24/2023 without complications who is here today to establish care.  She has been undergoing cardiac rehab which has been going well.  She continues to have numbness around her incision site.  No significant pain.  Recently under more stress as she suffered from COVID a couple weeks ago and her grandmother who she was close to passed away last week.  She still feels short of breath particularly going up stairs but this has been gradually improving.  No lower extremity edema, no orthopnea.  Very slight flutters lasting for maybe seconds and this occurs once a week and is not persistent.  She works as a  and is looking forward to returning to work on 10/23.  Significant family history of heart disease with her father passed away from MI at age  60.    Echocardiogram 8/28/23  The left ventricle is normal in size.  There is mild concentric left ventricular hypertrophy.  The visual ejection fraction is 40-45%.  Diastolic Doppler findings (E/E' ratio and/or other parameters) suggest left  ventricular filling pressures are increased.  There is mild global hypokinesia of the left ventricle.  The right ventricle is normal size.  Mildly decreased right ventricular systolic function.  Both atria moderately enlarged.  Trace to mild aortic valve regurgitation.  S/p mitral valve repair with annlulus ring placement. Redundant elongated  chordae are noted. The motion of posterior leaflet is fixed. Mild mitral valve  regurgitation. Flores gradient of mitral valve at ventricular rate 84 BPM is 4-5  mmHg.     When compared to previous mitral valve repair. LV systolic function is lower.    Today's clinic visit entailed:  Prescription drug management  40 minutes spent by me on the date of the encounter doing chart review, history and exam, documentation and further activities per the note  Provider  Link to [facility] Help Grid     The level of medical decision making during this visit was of high complexity.         Physical Examination  Review of Systems   Vitals: BP 96/56 (BP Location: Left arm, Patient Position: Sitting, Cuff Size: Adult Regular)   Pulse 96   Resp (!) 6   Wt 76.7 kg (169 lb)   LMP 06/19/2023   SpO2 98%   BMI 31.70 kg/m    BMI= Body mass index is 31.7 kg/m .  Wt Readings from Last 3 Encounters:   10/09/23 76.7 kg (169 lb)   09/25/23 74.6 kg (164 lb 6.4 oz)   09/21/23 74.4 kg (164 lb)       General Appearance:   no distress, normal body habitus   ENT/Mouth: membranes moist, no oral lesions or bleeding gums.      EYES:  no scleral icterus, normal conjunctivae   Neck: no carotid bruits or thyromegaly   Chest/Lungs:   lungs are clear to auscultation, incision site well healed   Cardiovascular:   Regular. Normal first and second heart sounds with no murmur  no  edema bilaterally    Abdomen:  bowel sounds are present   Extremities: no cyanosis or clubbing   Skin: no xanthelasma, warm.    Neurologic: normal  bilateral, no tremors     Psychiatric: alert and oriented x3, calm        Please refer above for cardiac ROS details.        Medical History  Surgical History Family History Social History   Past Medical History:   Diagnosis Date    Anxiety     previous on zoloft    Disease of thyroid gland     hypothyroxine    Gonadal dysgenesis     Created by Conversion     Heart disease     Urinary tract infection     non recurrent     Past Surgical History:   Procedure Laterality Date    MITRAL VALVE REPAIR N/A 8/24/2023    Procedure: Mitral valve repair, left atrial appendage ligation with ANESTHESIA TRANSESOPHAGEAL ECHOCARDIOGRAM;  Surgeon: Jeff Magdaleno MD;  [place] [facility] [place] EXTRACTION       Family History   Problem Relation Age of Onset    Hypertension Mother     Coronary Artery Disease Father     Myocardial Infarction Father 61    Ovarian cysts Sister     Hyperlipidemia Brother     No Known Problems Maternal Grandmother     Coronary Artery Disease Maternal Grandfather     Myocardial Infarction Maternal Grandfather     Parkinsonism Paternal Grandmother     Suicidality Paternal Grandfather         Social History     Socioeconomic History    Marital status: Single     Spouse name: Not on file    Number of children: Not on file    Years of education: Not on file    Highest education level: Not on file   Occupational History    Not on file   Tobacco Use    Smoking status: Former     Types: Cigarettes    Smokeless tobacco: Never   Vaping Use    Vaping Use: Former    Substances: Nicotine    Devices: Disposable   Substance and Sexual Activity    Alcohol use: Not Currently     Comment: very rare    Drug use: Yes     Types: Marijuana     Comment: edibles and smoking occassional    Sexual activity: Yes     Partners: Male     Birth  control/protection: OCP, Condom   Other Topics Concern    Not on file   Social History Narrative    Lives in apartment with roommate.   Works .  Single.       Social Determinants of Health     Financial Resource Strain: Low Risk  (9/25/2023)    Financial Resource Strain     Within the past 12 months, have you or your family members you live with been unable to get utilities (heat, electricity) when it was really needed?: No   Food Insecurity: Low Risk  (9/25/2023)    Food Insecurity     Within the past 12 months, did you worry that your food would run out before you got money to buy more?: No     Within the past 12 months, did the food you bought just not last and you didn t have money to get more?: No   Transportation Needs: Low Risk  (9/25/2023)    Transportation Needs     Within the past 12 months, has lack of transportation kept you from medical appointments, getting your medicines, non-medical meetings or appointments, work, or from getting things that you need?: No   Physical Activity: Not on file   Stress: Not on file   Social Connections: Not on file   Interpersonal Safety: Low Risk  (9/25/2023)    Interpersonal Safety     Do you feel physically and emotionally safe where you currently live?: Yes     Within the past 12 months, have you been hit, slapped, kicked or otherwise physically hurt by someone?: No     Within the past 12 months, have you been humiliated or emotionally abused in other ways by your partner or ex-partner?: No   Housing Stability: Low Risk  (9/25/2023)    Housing Stability     Do you have housing? : Yes     Are you worried about losing your housing?: No           Medications  Allergies   Current Outpatient Medications   Medication Sig Dispense Refill    aspirin (ASA) 81 MG chewable tablet Take 1 tablet (81 mg) by mouth daily 90 tablet 3    metoprolol tartrate (LOPRESSOR) 25 MG tablet Take 1 tablet (25 mg) by mouth 2 times daily 60 tablet 11       Allergies   Allergen Reactions     Amoxicillin Hives    Penicillins Hives          Lab Results    Chemistry/lipid CBC Cardiac Enzymes/BNP/TSH/INR   Recent Labs   Lab Test 01/14/22  1518   CHOL 189   HDL 64      TRIG 99     Recent Labs   Lab Test 01/14/22  1518 04/13/21  0918 02/17/20  1213    115 124     Recent Labs   Lab Test 08/28/23  0648 08/27/23  2349 08/26/23  0810 08/25/23  0557   NA  --   --   --  139   POTASSIUM 4.1  --    < > 3.7  3.6   CHLORIDE  --   --   --  105   CO2  --   --   --  22   GLC  --  99   < > 117*  123*   BUN  --   --   --  6.8   CR  --   --   --  0.51   GFRESTIMATED  --   --   --  >90   JAMSHID  --   --   --  8.1*    < > = values in this interval not displayed.     Recent Labs   Lab Test 08/25/23  0557 08/24/23  2347 08/24/23  1231   CR 0.51 0.50* 0.64     Recent Labs   Lab Test 08/16/23  0846 02/17/20  1213 11/14/18  1126   A1C 5.1 5.0 5.1          Recent Labs   Lab Test 08/28/23  0648 08/25/23  0557   WBC  --  10.6   HGB  --  10.0*   HCT  --  30.4*   MCV  --  90    127*     Recent Labs   Lab Test 08/25/23  0557 08/24/23  1231 08/24/23  1202   HGB 10.0* 11.0* 8.6*    No results for input(s): TROPONINI in the last 31897 hours.  No results for input(s): BNP, NTBNPI, NTBNP in the last 59612 hours.  Recent Labs   Lab Test 01/14/22  1518   TSH 4.02     Recent Labs   Lab Test 08/24/23  1231 08/24/23  1201 08/16/23  0732   INR 1.39* 1.53* 0.88        Peace Yan MD      Thank you for allowing me to participate in the care of your patient.      Sincerely,     Peace Yan MD     [facility]  cc:   No referring provider defined for this encounter.

## 2023-10-09 NOTE — PATIENT INSTRUCTIONS
Ms. Charlotte STARR Blair,     It was a pleasure to see you in the office today. My recommendations for you include:   1. Decrease metoprolol to 25 mg twice daily (1 tab)  2. Cardiac rehab  3. Follow up in 3 months.  Echocardiogram in one year     Please do not hesitate to call the [facility] clinic with any questions or concerns at [phone].    Sincerely,     Peace Yan MD

## 2023-10-09 NOTE — PROGRESS NOTES
HEART CARE ENCOUNTER CONSULTATON NOTE      [facility]  [phone]      Assessment/Recommendations   Assessment:  1.  Valvular heart disease: Severe mitral valve prolapse with regurgitation status post mitral valve repair 8/24/2023 with left atrial appendage ligation by Dr. Magdaleno.  Postoperative echocardiogram showed mild regurgitation with mean gradient of 4-5 mmHg at a heart rate of 84 bpm.  2.  Mild nonischemic cardiomyopathy LVEF 40-45% postoperatively  3.  Horn syndrome    Plan:  1.  Continue on aspirin 81 mg daily  2.  May decrease metoprolol dose to 5 mg twice daily  3.  Continue with cardiac rehab  Follow-up in a few months       History of Present Illness/Subjective    HPI: Charlotte Blair is a 23 year old female with history of Horn syndrome, bileaflet mitral valve prolapse with severe mitral insufficiency who recently underwent mitral valve repair and left atrial appendage ligation by Dr. Magdaleno on 8/24/2023 without complications who is here today to establish care.  She has been undergoing cardiac rehab which has been going well.  She continues to have numbness around her incision site.  No significant pain.  Recently under more stress as she suffered from COVID a couple weeks ago and her grandmother who she was close to passed away last week.  She still feels short of breath particularly going up stairs but this has been gradually improving.  No lower extremity edema, no orthopnea.  Very slight flutters lasting for maybe seconds and this occurs once a week and is not persistent.  She works as a  and is looking forward to returning to work on 10/23.  Significant family history of heart disease with her father passed away from MI at age 60.    Echocardiogram 8/28/23  The left ventricle is normal in size.  There is mild concentric left ventricular hypertrophy.  The visual ejection fraction is 40-45%.  Diastolic Doppler findings (E/E' ratio and/or other  parameters) suggest left  ventricular filling pressures are increased.  There is mild global hypokinesia of the left ventricle.  The right ventricle is normal size.  Mildly decreased right ventricular systolic function.  Both atria moderately enlarged.  Trace to mild aortic valve regurgitation.  S/p mitral valve repair with annlulus ring placement. Redundant elongated  chordae are noted. The motion of posterior leaflet is fixed. Mild mitral valve  regurgitation. Flores gradient of mitral valve at ventricular rate 84 BPM is 4-5  mmHg.     When compared to previous mitral valve repair. LV systolic function is lower.    Today's clinic visit entailed:  Prescription drug management  40 minutes spent by me on the date of the encounter doing chart review, history and exam, documentation and further activities per the note  Provider  Link to MDM Help Grid     The level of medical decision making during this visit was of high complexity.         Physical Examination  Review of Systems   Vitals: BP 96/56 (BP Location: Left arm, Patient Position: Sitting, Cuff Size: Adult Regular)   Pulse 96   Resp (!) 6   Wt 76.7 kg (169 lb)   LMP 06/19/2023   SpO2 98%   BMI 31.70 kg/m    BMI= Body mass index is 31.7 kg/m .  Wt Readings from Last 3 Encounters:   10/09/23 76.7 kg (169 lb)   09/25/23 74.6 kg (164 lb 6.4 oz)   09/21/23 74.4 kg (164 lb)       General Appearance:   no distress, normal body habitus   ENT/Mouth: membranes moist, no oral lesions or bleeding gums.      EYES:  no scleral icterus, normal conjunctivae   Neck: no carotid bruits or thyromegaly   Chest/Lungs:   lungs are clear to auscultation, incision site well healed   Cardiovascular:   Regular. Normal first and second heart sounds with no murmur  no edema bilaterally    Abdomen:  bowel sounds are present   Extremities: no cyanosis or clubbing   Skin: no xanthelasma, warm.    Neurologic: normal  bilateral, no tremors     Psychiatric: alert and oriented x3, calm         Please refer above for cardiac ROS details.        Medical History  Surgical History Family History Social History   Past Medical History:   Diagnosis Date    Anxiety     previous on zoloft    Disease of thyroid gland     hypothyroxine    Gonadal dysgenesis     Created by Conversion     Heart disease     Urinary tract infection     non recurrent     Past Surgical History:   Procedure Laterality Date    MITRAL VALVE REPAIR N/A 8/24/2023    Procedure: Mitral valve repair, left atrial appendage ligation with ANESTHESIA TRANSESOPHAGEAL ECHOCARDIOGRAM;  Surgeon: Jeff Magdaleno MD;  [place] [facility] [place]OTH EXTRACTION       Family History   Problem Relation Age of Onset    Hypertension Mother     Coronary Artery Disease Father     Myocardial Infarction Father 61    Ovarian cysts Sister     Hyperlipidemia Brother     No Known Problems Maternal Grandmother     Coronary Artery Disease Maternal Grandfather     Myocardial Infarction Maternal Grandfather     Parkinsonism Paternal Grandmother     Suicidality Paternal Grandfather         Social History     Socioeconomic History    Marital status: Single     Spouse name: Not on file    Number of children: Not on file    Years of education: Not on file    Highest education level: Not on file   Occupational History    Not on file   Tobacco Use    Smoking status: Former     Types: Cigarettes    Smokeless tobacco: Never   Vaping Use    Vaping Use: Former    Substances: Nicotine    Devices: Disposable   Substance and Sexual Activity    Alcohol use: Not Currently     Comment: very rare    Drug use: Yes     Types: Marijuana     Comment: edibles and smoking occassional    Sexual activity: Yes     Partners: Male     Birth control/protection: OCP, Condom   Other Topics Concern    Not on file   Social History Narrative    Lives in apartment with roommate.   Works .  Single.       Social Determinants of Health     Financial Resource Strain: Low  Risk  (9/25/2023)    Financial Resource Strain     Within the past 12 months, have you or your family members you live with been unable to get utilities (heat, electricity) when it was really needed?: No   Food Insecurity: Low Risk  (9/25/2023)    Food Insecurity     Within the past 12 months, did you worry that your food would run out before you got money to buy more?: No     Within the past 12 months, did the food you bought just not last and you didn t have money to get more?: No   Transportation Needs: Low Risk  (9/25/2023)    Transportation Needs     Within the past 12 months, has lack of transportation kept you from medical appointments, getting your medicines, non-medical meetings or appointments, work, or from getting things that you need?: No   Physical Activity: Not on file   Stress: Not on file   Social Connections: Not on file   Interpersonal Safety: Low Risk  (9/25/2023)    Interpersonal Safety     Do you feel physically and emotionally safe where you currently live?: Yes     Within the past 12 months, have you been hit, slapped, kicked or otherwise physically hurt by someone?: No     Within the past 12 months, have you been humiliated or emotionally abused in other ways by your partner or ex-partner?: No   Housing Stability: Low Risk  (9/25/2023)    Housing Stability     Do you have housing? : Yes     Are you worried about losing your housing?: No           Medications  Allergies   Current Outpatient Medications   Medication Sig Dispense Refill    aspirin (ASA) 81 MG chewable tablet Take 1 tablet (81 mg) by mouth daily 90 tablet 3    metoprolol tartrate (LOPRESSOR) 25 MG tablet Take 1 tablet (25 mg) by mouth 2 times daily 60 tablet 11       Allergies   Allergen Reactions    Amoxicillin Hives    Penicillins Hives          Lab Results    Chemistry/lipid CBC Cardiac Enzymes/BNP/TSH/INR   Recent Labs   Lab Test 01/14/22  1518   CHOL 189   HDL 64      TRIG 99     Recent Labs   Lab Test 01/14/22  1518  04/13/21  0918 02/17/20  1213    115 124     Recent Labs   Lab Test 08/28/23  0648 08/27/23  2349 08/26/23  0810 08/25/23  0557   NA  --   --   --  139   POTASSIUM 4.1  --    < > 3.7  3.6   CHLORIDE  --   --   --  105   CO2  --   --   --  22   GLC  --  99   < > 117*  123*   BUN  --   --   --  6.8   CR  --   --   --  0.51   GFRESTIMATED  --   --   --  >90   JAMSHID  --   --   --  8.1*    < > = values in this interval not displayed.     Recent Labs   Lab Test 08/25/23  0557 08/24/23  2347 08/24/23  1231   CR 0.51 0.50* 0.64     Recent Labs   Lab Test 08/16/23  0846 02/17/20  1213 11/14/18  1126   A1C 5.1 5.0 5.1          Recent Labs   Lab Test 08/28/23  0648 08/25/23  0557   WBC  --  10.6   HGB  --  10.0*   HCT  --  30.4*   MCV  --  90    127*     Recent Labs   Lab Test 08/25/23  0557 08/24/23  1231 08/24/23  1202   HGB 10.0* 11.0* 8.6*    No results for input(s): TROPONINI in the last 66061 hours.  No results for input(s): BNP, NTBNPI, NTBNP in the last 98532 hours.  Recent Labs   Lab Test 01/14/22  1518   TSH 4.02     Recent Labs   Lab Test 08/24/23  1231 08/24/23  1201 08/16/23  0732   INR 1.39* 1.53* 0.88        Peace Yan MD

## 2023-10-13 ENCOUNTER — HOSPITAL ENCOUNTER (OUTPATIENT)
Dept: CARDIAC REHAB | Facility: CLINIC | Age: 23
Discharge: HOME | End: 2023-10-13
Attending: INTERNAL MEDICINE
Payer: COMMERCIAL

## 2023-10-13 PROCEDURE — 93798 PHYS/QHP OP CAR RHAB W/ECG: CPT

## 2023-10-16 ENCOUNTER — HOSPITAL ENCOUNTER (OUTPATIENT)
Dept: CARDIAC REHAB | Facility: CLINIC | Age: 23
Discharge: HOME | End: 2023-10-16
Attending: INTERNAL MEDICINE
Payer: COMMERCIAL

## 2023-10-16 DIAGNOSIS — Z98.890 S/P MITRAL VALVE REPAIR: ICD-10-CM

## 2023-10-16 PROCEDURE — 93798 PHYS/QHP OP CAR RHAB W/ECG: CPT

## 2023-10-17 RX ORDER — METOPROLOL TARTRATE 25 MG/1
25 TABLET, FILM COATED ORAL 2 TIMES DAILY
Qty: 60 TABLET | Refills: 11 | OUTPATIENT
Start: 2023-10-17

## 2023-10-20 ENCOUNTER — HOSPITAL ENCOUNTER (OUTPATIENT)
Dept: CARDIAC REHAB | Facility: CLINIC | Age: 23
Discharge: HOME | End: 2023-10-20
Attending: INTERNAL MEDICINE
Payer: COMMERCIAL

## 2023-10-20 PROCEDURE — 93798 PHYS/QHP OP CAR RHAB W/ECG: CPT

## 2024-01-29 ENCOUNTER — OFFICE VISIT (OUTPATIENT)
Dept: CARDIOLOGY | Facility: CLINIC | Age: 24
End: 2024-01-29
Attending: INTERNAL MEDICINE
Payer: COMMERCIAL

## 2024-01-29 VITALS
BODY MASS INDEX: 33.99 KG/M2 | DIASTOLIC BLOOD PRESSURE: 66 MMHG | HEIGHT: 61 IN | RESPIRATION RATE: 14 BRPM | WEIGHT: 180 LBS | HEART RATE: 98 BPM | SYSTOLIC BLOOD PRESSURE: 96 MMHG

## 2024-01-29 DIAGNOSIS — Z98.890 S/P MITRAL VALVE REPAIR: ICD-10-CM

## 2024-01-29 PROCEDURE — 99214 OFFICE O/P EST MOD 30 MIN: CPT | Performed by: INTERNAL MEDICINE

## 2024-01-29 RX ORDER — METOPROLOL SUCCINATE 25 MG/1
25 TABLET, EXTENDED RELEASE ORAL DAILY
Qty: 90 TABLET | Refills: 3 | Status: SHIPPED | OUTPATIENT
Start: 2024-01-29 | End: 2024-03-05

## 2024-01-29 RX ORDER — SERTRALINE HYDROCHLORIDE 25 MG/1
25 TABLET, FILM COATED ORAL DAILY
COMMUNITY

## 2024-01-29 NOTE — PATIENT INSTRUCTIONS
Ms. Charlotte Blair,     It was a pleasure to see you in the office today. My recommendations for you include:   1. Stop metoprolol tartrate and start metoprolol succinate (xl) 25 mg daily  2. echocardiogram     Please do not hesitate to call the [facility] clinic with any questions or concerns at [phone].    Sincerely,     Peace Yan MD

## 2024-01-29 NOTE — LETTER
1/29/2024    Taylor Monte, APRN CNP  [address] [facility] [address]    RE: Charlotte Blair       Dear Colleague,     I had the pleasure of seeing Charlotte Blair in the [facility].    HEART CARE ENCOUNTER CONSULTATON NOTE      [facility]  [phone]      Assessment/Recommendations   Assessment:  1.  Valvular heart disease: Severe mitral valve prolapse with regurgitation status post mitral valve repair 8/24/2023 with left atrial appendage ligation by Dr. Magdaleno.  Postoperative echocardiogram showed mild regurgitation with mean gradient of 4-5 mmHg at a heart rate of 84 bpm.  2.  Mild nonischemic cardiomyopathy LVEF 40-45% postoperatively  3.  Horn syndrome    Plan:  Repeat echocardiogram  Will stop metoprolol tartrate and start on lower dose metoprolol XL 25 mg daily.   Follow up in one year       History of Present Illness/Subjective    HPI: Charlotte Blair is a 23 year old female  with history of Horn syndrome, bileaflet mitral valve prolapse with severe mitral insufficiency who recently underwent mitral valve repair and left atrial appendage ligation by Dr. Magdaleno on 8/24/2023 who is here for follow-up.  I last saw her in October.  She has continued to do well.  She has some mild discomfort on her chest that she mainly notices with sitting up or if her catheter upper chest.  Incision has healed up well.  She has some mild shortness of breath with walking but this has been gradually improving.  No edema or orthopnea.    Echocardiogram 8/28/2023  The left ventricle is normal in size.  There is mild concentric left ventricular hypertrophy.  The visual ejection fraction is 40-45%.  Diastolic Doppler findings (E/E' ratio and/or other parameters) suggest left  ventricular filling pressures are increased.  There is mild global hypokinesia of the left ventricle.  The right ventricle is normal size.  Mildly decreased right ventricular systolic  "function.  Both atria moderately enlarged.  Trace to mild aortic valve regurgitation.  S/p mitral valve repair with annlulus ring placement. Redundant elongated  chordae are noted. The motion of posterior leaflet is fixed. Mild mitral valve  regurgitation. Flores gradient of mitral valve at ventricular rate 84 BPM is 4-5  mmHg.     When compared to previous mitral valve repair. LV systolic function is lower.     Physical Examination  Review of Systems   Vitals: BP 96/66 (BP Location: Left arm, Patient Position: Sitting, Cuff Size: Adult Large)   Pulse 98   Resp 14   Ht 1.555 m (5' 1.22\")   Wt 81.6 kg (180 lb)   BMI 33.77 kg/m    BMI= Body mass index is 33.77 kg/m .  Wt Readings from Last 3 Encounters:   01/29/24 81.6 kg (180 lb)   10/09/23 76.7 kg (169 lb)   09/25/23 74.6 kg (164 lb 6.4 oz)       General Appearance:   no distress, normal body habitus   ENT/Mouth: membranes moist, no oral lesions or bleeding gums.      EYES:  no scleral icterus, normal conjunctivae   Neck: no carotid bruits or thyromegaly   Chest/Lungs:   lungs are clear to auscultation   Cardiovascular:   Regular. Normal first and second heart sounds with no murmur no edema bilaterally    Abdomen:  bowel sounds are present   Extremities: no cyanosis or clubbing   Skin: no xanthelasma, warm.    Neurologic: normal  bilateral, no tremors     Psychiatric: alert and oriented x3, calm        Please refer above for cardiac ROS details.        Medical History  Surgical History Family History Social History   Past Medical History:   Diagnosis Date    Anxiety     previous on zoloft    Disease of thyroid gland     hypothyroxine    Gonadal dysgenesis     Created by Conversion     Heart disease     Urinary tract infection     non recurrent     Past Surgical History:   Procedure Laterality Date    MITRAL VALVE REPAIR N/A 8/24/2023    Procedure: Mitral valve repair, left atrial appendage ligation with ANESTHESIA TRANSESOPHAGEAL ECHOCARDIOGRAM;  Surgeon: " Jeff Magdaleno MD;  Location: [facility] Main OR    [place]IS[place] TOOTH EXTRACTION       Family History   Problem Relation Age of Onset    Hypertension Mother     Coronary Artery Disease Father     Myocardial Infarction Father 61    Ovarian cysts Sister     Hyperlipidemia Brother     No Known Problems Maternal Grandmother     Coronary Artery Disease Maternal Grandfather     Myocardial Infarction Maternal Grandfather     Parkinsonism Paternal Grandmother     Suicidality Paternal Grandfather         Social History     Socioeconomic History    Marital status: Single     Spouse name: Not on file    Number of children: Not on file    Years of education: Not on file    Highest education level: Not on file   Occupational History    Not on file   Tobacco Use    Smoking status: Former     Types: Cigarettes    Smokeless tobacco: Never   Vaping Use    Vaping Use: Former    Substances: Nicotine    Devices: Disposable   Substance and Sexual Activity    Alcohol use: Not Currently     Comment: very rare    Drug use: Yes     Types: Marijuana     Comment: edibles and smoking occassional    Sexual activity: Yes     Partners: Male     Birth control/protection: OCP, Condom   Other Topics Concern    Not on file   Social History Narrative    Lives in apartment with roommate.   Works .  Single.       Social Determinants of Health     Financial Resource Strain: Low Risk  (9/25/2023)    Financial Resource Strain     Within the past 12 months, have you or your family members you live with been unable to get utilities (heat, electricity) when it was really needed?: No   Food Insecurity: Low Risk  (9/25/2023)    Food Insecurity     Within the past 12 months, did you worry that your food would run out before you got money to buy more?: No     Within the past 12 months, did the food you bought just not last and you didn t have money to get more?: No   Transportation Needs: Low Risk  (9/25/2023)    Transportation Needs     Within the  past 12 months, has lack of transportation kept you from medical appointments, getting your medicines, non-medical meetings or appointments, work, or from getting things that you need?: No   Physical Activity: Not on file   Stress: Not on file   Social Connections: Not on file   Interpersonal Safety: Low Risk  (9/25/2023)    Interpersonal Safety     Do you feel physically and emotionally safe where you currently live?: Yes     Within the past 12 months, have you been hit, slapped, kicked or otherwise physically hurt by someone?: No     Within the past 12 months, have you been humiliated or emotionally abused in other ways by your partner or ex-partner?: No   Housing Stability: Low Risk  (9/25/2023)    Housing Stability     Do you have housing? : Yes     Are you worried about losing your housing?: No           Medications  Allergies   Current Outpatient Medications   Medication Sig Dispense Refill    aspirin (ASA) 81 MG chewable tablet Take 1 tablet (81 mg) by mouth daily 90 tablet 3    metoprolol tartrate (LOPRESSOR) 25 MG tablet Take 1 tablet (25 mg) by mouth 2 times daily 60 tablet 11    sertraline (ZOLOFT) 25 MG tablet Take 25 mg by mouth daily         Allergies   Allergen Reactions    Amoxicillin Hives    Penicillins Hives          Lab Results    Chemistry/lipid CBC Cardiac Enzymes/BNP/TSH/INR   Recent Labs   Lab Test 01/14/22  1518   CHOL 189   HDL 64      TRIG 99     Recent Labs   Lab Test 01/14/22  1518 04/13/21  0918 02/17/20  1213    115 124     Recent Labs   Lab Test 08/28/23  0648 08/27/23  2349 08/26/23  0810 08/25/23  0557   NA  --   --   --  139   POTASSIUM 4.1  --    < > 3.7  3.6   CHLORIDE  --   --   --  105   CO2  --   --   --  22   GLC  --  99   < > 117*  123*   BUN  --   --   --  6.8   CR  --   --   --  0.51   GFRESTIMATED  --   --   --  >90   JAMSHID  --   --   --  8.1*    < > = values in this interval not displayed.     Recent Labs   Lab Test 08/25/23  0557 08/24/23  5404  "08/24/23  1231   CR 0.51 0.50* 0.64     Recent Labs   Lab Test 08/16/23  0846 02/17/20  1213 11/14/18  1126   A1C 5.1 5.0 5.1          Recent Labs   Lab Test 08/28/23  0648 08/25/23  0557   WBC  --  10.6   HGB  --  10.0*   HCT  --  30.4*   MCV  --  90    127*     Recent Labs   Lab Test 08/25/23  0557 08/24/23  1231 08/24/23  1202   HGB 10.0* 11.0* 8.6*    No results for input(s): \"TROPONINI\" in the last 13332 hours.  No results for input(s): \"BNP\", \"NTBNPI\", \"NTBNP\" in the last 41649 hours.  Recent Labs   Lab Test 01/14/22  1518   TSH 4.02     Recent Labs   Lab Test 08/24/23  1231 08/24/23  1201 08/16/23  0732   INR 1.39* 1.53* 0.88        Peace Yan MD        Thank you for allowing me to participate in the care of your patient.      Sincerely,     Peace Yan MD     [facility]  cc:   Peace Yan MD  [address] [address]      "

## 2024-01-29 NOTE — PROGRESS NOTES
HEART CARE ENCOUNTER CONSULTATON NOTE      [facility]  [phone]      Assessment/Recommendations   Assessment:  1.  Valvular heart disease: Severe mitral valve prolapse with regurgitation status post mitral valve repair 8/24/2023 with left atrial appendage ligation by Dr. Magdaleno.  Postoperative echocardiogram showed mild regurgitation with mean gradient of 4-5 mmHg at a heart rate of 84 bpm.  2.  Mild nonischemic cardiomyopathy LVEF 40-45% postoperatively  3.  Horn syndrome    Plan:  Repeat echocardiogram  Will stop metoprolol tartrate and start on lower dose metoprolol XL 25 mg daily.   Follow up in one year       History of Present Illness/Subjective    HPI: Charlotte Blair is a 23 year old female  with history of Horn syndrome, bileaflet mitral valve prolapse with severe mitral insufficiency who recently underwent mitral valve repair and left atrial appendage ligation by Dr. Magdaleno on 8/24/2023 who is here for follow-up.  I last saw her in October.  She has continued to do well.  She has some mild discomfort on her chest that she mainly notices with sitting up or if her catheter upper chest.  Incision has healed up well.  She has some mild shortness of breath with walking but this has been gradually improving.  No edema or orthopnea.    Echocardiogram 8/28/2023  The left ventricle is normal in size.  There is mild concentric left ventricular hypertrophy.  The visual ejection fraction is 40-45%.  Diastolic Doppler findings (E/E' ratio and/or other parameters) suggest left  ventricular filling pressures are increased.  There is mild global hypokinesia of the left ventricle.  The right ventricle is normal size.  Mildly decreased right ventricular systolic function.  Both atria moderately enlarged.  Trace to mild aortic valve regurgitation.  S/p mitral valve repair with annlulus ring placement. Redundant elongated  chordae are noted. The motion of posterior leaflet is fixed. Mild  "mitral valve  regurgitation. Flores gradient of mitral valve at ventricular rate 84 BPM is 4-5  mmHg.     When compared to previous mitral valve repair. LV systolic function is lower.     Physical Examination  Review of Systems   Vitals: BP 96/66 (BP Location: Left arm, Patient Position: Sitting, Cuff Size: Adult Large)   Pulse 98   Resp 14   Ht 1.555 m (5' 1.22\")   Wt 81.6 kg (180 lb)   BMI 33.77 kg/m    BMI= Body mass index is 33.77 kg/m .  Wt Readings from Last 3 Encounters:   01/29/24 81.6 kg (180 lb)   10/09/23 76.7 kg (169 lb)   09/25/23 74.6 kg (164 lb 6.4 oz)       General Appearance:   no distress, normal body habitus   ENT/Mouth: membranes moist, no oral lesions or bleeding gums.      EYES:  no scleral icterus, normal conjunctivae   Neck: no carotid bruits or thyromegaly   Chest/Lungs:   lungs are clear to auscultation   Cardiovascular:   Regular. Normal first and second heart sounds with no murmur no edema bilaterally    Abdomen:  bowel sounds are present   Extremities: no cyanosis or clubbing   Skin: no xanthelasma, warm.    Neurologic: normal  bilateral, no tremors     Psychiatric: alert and oriented x3, calm        Please refer above for cardiac ROS details.        Medical History  Surgical History Family History Social History   Past Medical History:   Diagnosis Date    Anxiety     previous on zoloft    Disease of thyroid gland     hypothyroxine    Gonadal dysgenesis     Created by Conversion     Heart disease     Urinary tract infection     non recurrent     Past Surgical History:   Procedure Laterality Date    MITRAL VALVE REPAIR N/A 8/24/2023    Procedure: Mitral valve repair, left atrial appendage ligation with ANESTHESIA TRANSESOPHAGEAL ECHOCARDIOGRAM;  Surgeon: Jeff Magdaleno MD;  Location: [facility] OR    [place] TOOTH EXTRACTION       Family History   Problem Relation Age of Onset    Hypertension Mother     Coronary Artery Disease Father     Myocardial Infarction Father " 61    Ovarian cysts Sister     Hyperlipidemia Brother     No Known Problems Maternal Grandmother     Coronary Artery Disease Maternal Grandfather     Myocardial Infarction Maternal Grandfather     Parkinsonism Paternal Grandmother     Suicidality Paternal Grandfather         Social History     Socioeconomic History    Marital status: Single     Spouse name: Not on file    Number of children: Not on file    Years of education: Not on file    Highest education level: Not on file   Occupational History    Not on file   Tobacco Use    Smoking status: Former     Types: Cigarettes    Smokeless tobacco: Never   Vaping Use    Vaping Use: Former    Substances: Nicotine    Devices: Disposable   Substance and Sexual Activity    Alcohol use: Not Currently     Comment: very rare    Drug use: Yes     Types: Marijuana     Comment: edibles and smoking occassional    Sexual activity: Yes     Partners: Male     Birth control/protection: OCP, Condom   Other Topics Concern    Not on file   Social History Narrative    Lives in apartment with roommate.   Works .  Single.       Social Determinants of Health     Financial Resource Strain: Low Risk  (9/25/2023)    Financial Resource Strain     Within the past 12 months, have you or your family members you live with been unable to get utilities (heat, electricity) when it was really needed?: No   Food Insecurity: Low Risk  (9/25/2023)    Food Insecurity     Within the past 12 months, did you worry that your food would run out before you got money to buy more?: No     Within the past 12 months, did the food you bought just not last and you didn t have money to get more?: No   Transportation Needs: Low Risk  (9/25/2023)    Transportation Needs     Within the past 12 months, has lack of transportation kept you from medical appointments, getting your medicines, non-medical meetings or appointments, work, or from getting things that you need?: No   Physical Activity: Not on file    Stress: Not on file   Social Connections: Not on file   Interpersonal Safety: Low Risk  (9/25/2023)    Interpersonal Safety     Do you feel physically and emotionally safe where you currently live?: Yes     Within the past 12 months, have you been hit, slapped, kicked or otherwise physically hurt by someone?: No     Within the past 12 months, have you been humiliated or emotionally abused in other ways by your partner or ex-partner?: No   Housing Stability: Low Risk  (9/25/2023)    Housing Stability     Do you have housing? : Yes     Are you worried about losing your housing?: No           Medications  Allergies   Current Outpatient Medications   Medication Sig Dispense Refill    aspirin (ASA) 81 MG chewable tablet Take 1 tablet (81 mg) by mouth daily 90 tablet 3    metoprolol tartrate (LOPRESSOR) 25 MG tablet Take 1 tablet (25 mg) by mouth 2 times daily 60 tablet 11    sertraline (ZOLOFT) 25 MG tablet Take 25 mg by mouth daily         Allergies   Allergen Reactions    Amoxicillin Hives    Penicillins Hives          Lab Results    Chemistry/lipid CBC Cardiac Enzymes/BNP/TSH/INR   Recent Labs   Lab Test 01/14/22  1518   CHOL 189   HDL 64      TRIG 99     Recent Labs   Lab Test 01/14/22  1518 04/13/21  0918 02/17/20  1213    115 124     Recent Labs   Lab Test 08/28/23  0648 08/27/23  2349 08/26/23  0810 08/25/23  0557   NA  --   --   --  139   POTASSIUM 4.1  --    < > 3.7  3.6   CHLORIDE  --   --   --  105   CO2  --   --   --  22   GLC  --  99   < > 117*  123*   BUN  --   --   --  6.8   CR  --   --   --  0.51   GFRESTIMATED  --   --   --  >90   JAMSHID  --   --   --  8.1*    < > = values in this interval not displayed.     Recent Labs   Lab Test 08/25/23  0557 08/24/23  2347 08/24/23  1231   CR 0.51 0.50* 0.64     Recent Labs   Lab Test 08/16/23  0846 02/17/20  1213 11/14/18  1126   A1C 5.1 5.0 5.1          Recent Labs   Lab Test 08/28/23  0648 08/25/23  0557   WBC  --  10.6   HGB  --  10.0*   HCT  --   "30.4*   MCV  --  90    127*     Recent Labs   Lab Test 08/25/23  0557 08/24/23  1231 08/24/23  1202   HGB 10.0* 11.0* 8.6*    No results for input(s): \"TROPONINI\" in the last 51794 hours.  No results for input(s): \"BNP\", \"NTBNPI\", \"NTBNP\" in the last 12196 hours.  Recent Labs   Lab Test 01/14/22  1518   TSH 4.02     Recent Labs   Lab Test 08/24/23  1231 08/24/23  1201 08/16/23  0732   INR 1.39* 1.53* 0.88        Peace Yan MD                                      "

## 2024-03-05 ENCOUNTER — E-VISIT (OUTPATIENT)
Dept: URGENT CARE | Facility: CLINIC | Age: 24
End: 2024-03-05
Payer: COMMERCIAL

## 2024-03-05 ENCOUNTER — MYC REFILL (OUTPATIENT)
Dept: CARDIOLOGY | Facility: CLINIC | Age: 24
End: 2024-03-05

## 2024-03-05 DIAGNOSIS — N39.0 ACUTE UTI (URINARY TRACT INFECTION): Primary | ICD-10-CM

## 2024-03-05 DIAGNOSIS — R30.0 DYSURIA: Primary | ICD-10-CM

## 2024-03-05 DIAGNOSIS — Z98.890 S/P MITRAL VALVE REPAIR: ICD-10-CM

## 2024-03-05 PROCEDURE — 99421 OL DIG E/M SVC 5-10 MIN: CPT | Performed by: PREVENTIVE MEDICINE

## 2024-03-05 PROCEDURE — 99207 PR NON-BILLABLE SERV PER CHARTING: CPT | Performed by: PREVENTIVE MEDICINE

## 2024-03-05 RX ORDER — METOPROLOL SUCCINATE 25 MG/1
25 TABLET, EXTENDED RELEASE ORAL DAILY
Qty: 90 TABLET | Refills: 2 | Status: SHIPPED | OUTPATIENT
Start: 2024-03-05

## 2024-03-05 RX ORDER — NITROFURANTOIN 25; 75 MG/1; MG/1
100 CAPSULE ORAL 2 TIMES DAILY
Qty: 10 CAPSULE | Refills: 0 | Status: SHIPPED | OUTPATIENT
Start: 2024-03-05 | End: 2024-03-10

## 2024-03-05 NOTE — PATIENT INSTRUCTIONS
Dear Charlotte Blair,     After reviewing your responses, I would like you to come in for a urine test to make sure we treat you correctly. This urine test is to evaluate you for a possible urinary tract infection, and should be scheduled for today or tomorrow. Schedule a Lab Only appointment here.     Lab appointments are not available at most locations on the weekends. If no Lab Only appointment is available, you should be seen in any of our convenient Walk-in or Urgent Care Centers, which can be found on our website here.     You will receive instructions with your results in [employer] once they are available.     If your symptoms worsen, you develop pain in your back or stomach, develop fevers, or are not improving in 5 days, please contact your primary care provider for an appointment or visit a Walk-in or Urgent Care Center to be seen.     Thanks again for choosing us as your health care partner,     Sav Gibson MD, MD  Urinary Tract Infection (UTI) in Women: Care Instructions  Overview     A urinary tract infection, or UTI, is a general term for an infection anywhere between the kidneys and the urethra (where urine comes out). Most UTIs are bladder infections. They often cause pain or burning when you urinate.  UTIs are caused by bacteria and can be cured with antibiotics. Be sure to complete your treatment so that the infection does not get worse.  Follow-up care is a key part of your treatment and safety. Be sure to make and go to all appointments, and call your doctor if you are having problems. It's also a good idea to know your test results and keep a list of the medicines you take.  How can you care for yourself at home?  Take your antibiotics as directed. Do not stop taking them just because you feel better. You need to take the full course of antibiotics.  Drink extra water and other fluids for the next day or two. This will help make the urine less concentrated and help wash out  "the bacteria that are causing the infection. (If you have kidney, heart, or liver disease and have to limit fluids, talk with your doctor before you increase the amount of fluids you drink.)  Avoid drinks that are carbonated or have caffeine. They can irritate the bladder.  Urinate often. Try to empty your bladder each time.  To relieve pain, take a hot bath or lay a heating pad set on low over your lower belly or genital area. Never go to sleep with a heating pad in place.  To prevent UTIs  Drink plenty of water each day. This helps you urinate often, which clears bacteria from your system. (If you have kidney, heart, or liver disease and have to limit fluids, talk with your doctor before you increase the amount of fluids you drink.)  Urinate when you need to.  If you are sexually active, urinate right after you have sex.  Change sanitary pads often.  Avoid douches, bubble baths, feminine hygiene sprays, and other feminine hygiene products that have deodorants.  After going to the bathroom, wipe from front to back.  When should you call for help?   Call your doctor now or seek immediate medical care if:    Symptoms such as fever, chills, nausea, or vomiting get worse or appear for the first time.     You have new pain in your back just below your rib cage. This is called flank pain.     There is new blood or pus in your urine.     You have any problems with your antibiotic medicine.   Watch closely for changes in your health, and be sure to contact your doctor if:    You are not getting better after taking an antibiotic for 2 days.     Your symptoms go away but then come back.   Where can you learn more?  Go to https://www.[employer].net/patiented  Enter K848 in the search box to learn more about \"Urinary Tract Infection (UTI) in Women: Care Instructions.\"  Current as of: February 28, 2023               Content Version: 13.8    0782-4441 [employer].   Care instructions adapted under license by your " healthcare professional. If you have questions about a medical condition or this instruction, always ask your healthcare professional. [employer] disclaims any warranty or liability for your use of this information.       Fall with Harm Risk

## 2024-03-05 NOTE — PATIENT INSTRUCTIONS
Dear Charlotte Blair    After reviewing your responses, I've been able to diagnose you with a urinary tract infection, which is a common infection of the bladder with bacteria.  This is not a sexually transmitted infection, though urinating immediately after intercourse can help prevent infections.  Drinking lots of fluids is also helpful to clear your current infection and prevent the next one.      I have sent a prescription for antibiotics to your pharmacy to treat this infection.    It is important that you take all of your prescribed medication even if your symptoms are improving after a few doses.  Taking all of your medicine helps prevent the symptoms from returning.     If your symptoms worsen, you develop pain in your back or stomach, develop fevers, or are not improving in 5 days, please contact your primary care provider for an appointment or visit any of our convenient Walk-in or Urgent Care Centers to be seen, which can be found on our website here.    Thanks again for choosing us as your health care partner,    Sav Gibson MD, MD  Urinary Tract Infection (UTI) in Women: Care Instructions  Overview     A urinary tract infection, or UTI, is a general term for an infection anywhere between the kidneys and the urethra (where urine comes out). Most UTIs are bladder infections. They often cause pain or burning when you urinate.  UTIs are caused by bacteria and can be cured with antibiotics. Be sure to complete your treatment so that the infection does not get worse.  Follow-up care is a key part of your treatment and safety. Be sure to make and go to all appointments, and call your doctor if you are having problems. It's also a good idea to know your test results and keep a list of the medicines you take.  How can you care for yourself at home?  Take your antibiotics as directed. Do not stop taking them just because you feel better. You need to take the full course of  "antibiotics.  Drink extra water and other fluids for the next day or two. This will help make the urine less concentrated and help wash out the bacteria that are causing the infection. (If you have kidney, heart, or liver disease and have to limit fluids, talk with your doctor before you increase the amount of fluids you drink.)  Avoid drinks that are carbonated or have caffeine. They can irritate the bladder.  Urinate often. Try to empty your bladder each time.  To relieve pain, take a hot bath or lay a heating pad set on low over your lower belly or genital area. Never go to sleep with a heating pad in place.  To prevent UTIs  Drink plenty of water each day. This helps you urinate often, which clears bacteria from your system. (If you have kidney, heart, or liver disease and have to limit fluids, talk with your doctor before you increase the amount of fluids you drink.)  Urinate when you need to.  If you are sexually active, urinate right after you have sex.  Change sanitary pads often.  Avoid douches, bubble baths, feminine hygiene sprays, and other feminine hygiene products that have deodorants.  After going to the bathroom, wipe from front to back.  When should you call for help?   Call your doctor now or seek immediate medical care if:    Symptoms such as fever, chills, nausea, or vomiting get worse or appear for the first time.     You have new pain in your back just below your rib cage. This is called flank pain.     There is new blood or pus in your urine.     You have any problems with your antibiotic medicine.   Watch closely for changes in your health, and be sure to contact your doctor if:    You are not getting better after taking an antibiotic for 2 days.     Your symptoms go away but then come back.   Where can you learn more?  Go to https://www.healthwise.net/patiented  Enter K848 in the search box to learn more about \"Urinary Tract Infection (UTI) in Women: Care Instructions.\"  Current as of: " February 28, 2023               Content Version: 13.8    [id number] [email or web address].   Care instructions adapted under license by your healthcare professional. If you have questions about a medical condition or this instruction, always ask your healthcare professional. [employer]@ disclaims any warranty or liability for your use of this information.

## 2024-09-15 ENCOUNTER — HEALTH MAINTENANCE LETTER (OUTPATIENT)
Age: 24
End: 2024-09-15

## 2024-12-09 ENCOUNTER — MYC REFILL (OUTPATIENT)
Dept: CARDIOLOGY | Facility: CLINIC | Age: 24
End: 2024-12-09
Payer: COMMERCIAL

## 2024-12-09 DIAGNOSIS — Z98.890 S/P MITRAL VALVE REPAIR: ICD-10-CM

## 2024-12-09 RX ORDER — METOPROLOL SUCCINATE 25 MG/1
25 TABLET, EXTENDED RELEASE ORAL DAILY
Qty: 90 TABLET | Refills: 0 | Status: SHIPPED | OUTPATIENT
Start: 2024-12-09

## 2025-02-01 ENCOUNTER — MYC REFILL (OUTPATIENT)
Dept: CARDIOLOGY | Facility: CLINIC | Age: 25
End: 2025-02-01
Payer: COMMERCIAL

## 2025-02-01 DIAGNOSIS — Z98.890 S/P MITRAL VALVE REPAIR: ICD-10-CM

## 2025-02-03 RX ORDER — METOPROLOL SUCCINATE 25 MG/1
25 TABLET, EXTENDED RELEASE ORAL DAILY
Qty: 90 TABLET | Refills: 0 | Status: SHIPPED | OUTPATIENT
Start: 2025-02-03

## 2025-03-09 ENCOUNTER — MYC REFILL (OUTPATIENT)
Dept: CARDIOLOGY | Facility: CLINIC | Age: 25
End: 2025-03-09
Payer: COMMERCIAL

## 2025-03-09 DIAGNOSIS — Z98.890 S/P MITRAL VALVE REPAIR: ICD-10-CM

## 2025-03-10 RX ORDER — METOPROLOL SUCCINATE 25 MG/1
25 TABLET, EXTENDED RELEASE ORAL DAILY
Qty: 90 TABLET | Refills: 0 | OUTPATIENT
Start: 2025-03-10

## 2025-05-05 ENCOUNTER — MYC REFILL (OUTPATIENT)
Dept: CARDIOLOGY | Facility: CLINIC | Age: 25
End: 2025-05-05
Payer: COMMERCIAL

## 2025-05-05 DIAGNOSIS — Z98.890 S/P MITRAL VALVE REPAIR: ICD-10-CM

## 2025-05-06 RX ORDER — METOPROLOL SUCCINATE 25 MG/1
25 TABLET, EXTENDED RELEASE ORAL DAILY
Qty: 90 TABLET | Refills: 0 | Status: SHIPPED | OUTPATIENT
Start: 2025-05-06

## 2025-05-06 NOTE — TELEPHONE ENCOUNTER
Per Dr. Yan's 1-29-24 visit note:  Plan:  Repeat echocardiogram  Will stop metoprolol tartrate and start on lower dose metoprolol XL 25 mg daily.   Follow up in one year    Noted 3-1-24 echo cancelled and never rescheduled - new echo order placed per protocol - msg sent to sched w/ request to arrange echo prior to 8-19-25 follow-up appt - Rx refilled until seen.  mg

## 2025-07-22 ENCOUNTER — TELEPHONE (OUTPATIENT)
Dept: CARDIOLOGY | Facility: CLINIC | Age: 25
End: 2025-07-22
Payer: COMMERCIAL

## 2025-07-22 NOTE — TELEPHONE ENCOUNTER
Health Call Center    Phone Message    May a detailed message be left on voicemail: yes     Reason for Call: Symptoms or Concerns     If patient has red-flag symptoms, warm transfer to triage line    Current symptom or concern:   Chest discomfort / tightness  SOB   Dizziness      Symptoms have been present for:  1 week(s)    Has patient previously been seen for this? Yes    By: Dr. Yan     Are there any new or worsening symptoms? No      Pt stated she needs to seen sooner, currently scheduled to see Dr. Yan 8/19.  Pt stated to be having chest discomfort offer to connect patient with a Triage nurse patient declined. Offer appointment with [facility] but first available is not till October.     [facility] call patient back to further discuss.     Action Taken: Other: Cardio     Travel Screening: Not Applicable     Date of Service:             Thank you!  [facility]

## 2025-07-23 NOTE — TELEPHONE ENCOUNTER
Left detailed message outlining that she was to follow up this past January, additionally that echo has been ordered and needs to be complete prior to making decision on being seen sooner.  Information left on how to call and schedule echo, additionally left direct call back number for additional questions.

## (undated) DEVICE — SU DERMABOND ADVANCED .7ML DNX12

## (undated) DEVICE — SU PROLENE 3-0 SHDA 36" 8522H

## (undated) DEVICE — GLOVE BIOGEL PI SZ 6.5 40865

## (undated) DEVICE — PLATE GROUNDING ADULT W/CORD 9165L

## (undated) DEVICE — CUSTOM PACK CV ST JOES SCV5BCVHEA

## (undated) DEVICE — CELL SAVER

## (undated) DEVICE — CATH THORACIC STRAIGHT CLOTSTOP 28FR

## (undated) DEVICE — SU PDS II 0 CT-1 27" Z340H

## (undated) DEVICE — SET CANNULATION TOUNIQUET TS-10061

## (undated) DEVICE — SOL NACL 0.9% IRRIG 1000ML BOTTLE 2F7124

## (undated) DEVICE — Device

## (undated) DEVICE — PITCHER STERILE 1000ML  SSK9004A

## (undated) DEVICE — CATH SUCTION 14FR W/O CTRL DYND41962

## (undated) DEVICE — GLOVE BIOGEL PI SZ 7.0 40870

## (undated) DEVICE — SU PLEDGET SOFT TFE 3/8"X3/26"X1/16" PCP40

## (undated) DEVICE — SU PROLENE 5-0 RB-1DA 36"  8556H

## (undated) DEVICE — SU DEVICE ENDO COR KNOT QUICK LOAD RELOAD 030874

## (undated) DEVICE — SU PROLENE 4-0 SHDA 36" 8521H

## (undated) DEVICE — HEMOSTASIS BONE OSTENE 2.5G SYNTHETIC 1503832

## (undated) DEVICE — SU PROLENE 4-0 RB-1DA 36" 8557H

## (undated) DEVICE — TRAY CATH SURESTEP OD14 FR INTERMITTENT STER LTX INTS14

## (undated) DEVICE — GOWN IMPERVIOUS BREATHABLE SMART XLG 89045

## (undated) DEVICE — PREP CHLORAPREP 26ML TINTED HI-LITE ORANGE 930815

## (undated) DEVICE — SU ETHIBOND 2-0 SHDA 30" X563H

## (undated) DEVICE — SUCTION DRY CHEST DRAIN OASIS 3600-100

## (undated) DEVICE — SOL WATER IRRIG 1000ML BOTTLE 2F7114

## (undated) DEVICE — SU DEVICE COR-KNOT MINI 031300

## (undated) DEVICE — PACK MINOR SINGLE BASIN SSK3001

## (undated) DEVICE — SU PDS II 3-0 SH 27" Z316H

## (undated) DEVICE — SUTURE MONOCRYL+ 4-0 PS-2 27IN MCP426H

## (undated) DEVICE — SUTURE ETHIBOND 2-0 V-7V-7 10X66

## (undated) DEVICE — SU UMBILICAL TAPE .125X30" U11T

## (undated) DEVICE — LEAD PACER MYOCARDIAL BIPOLAR TEMPORARY 53CM 6495F

## (undated) DEVICE — DRSG DRAIN 4X4" 7086

## (undated) DEVICE — NEEDLE SUTURE ANCHOR 1861-3DG

## (undated) DEVICE — SU PDS II 2-0 CT 36"  Z357H

## (undated) RX ORDER — NEOSTIGMINE METHYLSULFATE 1 MG/ML
VIAL (ML) INJECTION
Status: DISPENSED
Start: 2023-08-24

## (undated) RX ORDER — FUROSEMIDE 10 MG/ML
INJECTION INTRAMUSCULAR; INTRAVENOUS
Status: DISPENSED
Start: 2023-08-24

## (undated) RX ORDER — FENTANYL CITRATE 50 UG/ML
INJECTION, SOLUTION INTRAMUSCULAR; INTRAVENOUS
Status: DISPENSED
Start: 2023-08-24

## (undated) RX ORDER — VANCOMYCIN HYDROCHLORIDE 1 G/20ML
INJECTION, POWDER, LYOPHILIZED, FOR SOLUTION INTRAVENOUS
Status: DISPENSED
Start: 2023-08-24

## (undated) RX ORDER — LIDOCAINE HYDROCHLORIDE 10 MG/ML
INJECTION, SOLUTION EPIDURAL; INFILTRATION; INTRACAUDAL; PERINEURAL
Status: DISPENSED
Start: 2023-08-24

## (undated) RX ORDER — ONDANSETRON 2 MG/ML
INJECTION INTRAMUSCULAR; INTRAVENOUS
Status: DISPENSED
Start: 2023-08-24

## (undated) RX ORDER — FENTANYL CITRATE-0.9 % NACL/PF 10 MCG/ML
PLASTIC BAG, INJECTION (ML) INTRAVENOUS
Status: DISPENSED
Start: 2023-08-24

## (undated) RX ORDER — PROPOFOL 10 MG/ML
INJECTION, EMULSION INTRAVENOUS
Status: DISPENSED
Start: 2023-08-24

## (undated) RX ORDER — EPHEDRINE SULFATE 50 MG/ML
INJECTION, SOLUTION INTRAMUSCULAR; INTRAVENOUS; SUBCUTANEOUS
Status: DISPENSED
Start: 2023-08-24

## (undated) RX ORDER — GLYCOPYRROLATE 0.2 MG/ML
INJECTION, SOLUTION INTRAMUSCULAR; INTRAVENOUS
Status: DISPENSED
Start: 2023-08-24